# Patient Record
Sex: FEMALE | Race: WHITE | Employment: OTHER | URBAN - NONMETROPOLITAN AREA
[De-identification: names, ages, dates, MRNs, and addresses within clinical notes are randomized per-mention and may not be internally consistent; named-entity substitution may affect disease eponyms.]

---

## 2017-01-11 RX ORDER — BENZONATATE 100 MG/1
100 CAPSULE ORAL 3 TIMES DAILY PRN
Qty: 30 CAPSULE | Refills: 1 | Status: SHIPPED | OUTPATIENT
Start: 2017-01-11 | End: 2018-02-21

## 2017-01-20 ENCOUNTER — CARE COORDINATION (OUTPATIENT)
Dept: INTERNAL MEDICINE | Age: 82
End: 2017-01-20

## 2017-01-25 ENCOUNTER — OFFICE VISIT (OUTPATIENT)
Age: 82
End: 2017-01-25

## 2017-01-25 VITALS
TEMPERATURE: 98.9 F | WEIGHT: 122.2 LBS | OXYGEN SATURATION: 97 % | DIASTOLIC BLOOD PRESSURE: 82 MMHG | BODY MASS INDEX: 20.36 KG/M2 | SYSTOLIC BLOOD PRESSURE: 120 MMHG | HEART RATE: 77 BPM | HEIGHT: 65 IN | RESPIRATION RATE: 14 BRPM

## 2017-01-25 DIAGNOSIS — F03.90 DEMENTIA WITHOUT BEHAVIORAL DISTURBANCE, UNSPECIFIED DEMENTIA TYPE: ICD-10-CM

## 2017-01-25 DIAGNOSIS — J98.4 SMALL AIRWAYS DISEASE: Primary | ICD-10-CM

## 2017-01-25 DIAGNOSIS — J30.89 OTHER ALLERGIC RHINITIS: ICD-10-CM

## 2017-01-25 DIAGNOSIS — J84.10 CALCIFIED GRANULOMA OF LUNG (HCC): ICD-10-CM

## 2017-01-25 DIAGNOSIS — J98.4 PULMONARY SCARRING: ICD-10-CM

## 2017-01-25 PROCEDURE — 99214 OFFICE O/P EST MOD 30 MIN: CPT | Performed by: INTERNAL MEDICINE

## 2017-01-25 RX ORDER — BUDESONIDE AND FORMOTEROL FUMARATE DIHYDRATE 80; 4.5 UG/1; UG/1
2 AEROSOL RESPIRATORY (INHALATION) 2 TIMES DAILY
Qty: 1 INHALER | Refills: 11 | Status: SHIPPED | OUTPATIENT
Start: 2017-01-25 | End: 2022-09-15

## 2017-02-03 RX ORDER — ACETAMINOPHEN 325 MG/1
TABLET ORAL
Qty: 120 TABLET | Refills: 11 | Status: SHIPPED | OUTPATIENT
Start: 2017-02-03 | End: 2020-05-08

## 2017-02-06 ENCOUNTER — OFFICE VISIT (OUTPATIENT)
Dept: CARDIOLOGY | Age: 82
End: 2017-02-06

## 2017-02-06 VITALS
DIASTOLIC BLOOD PRESSURE: 60 MMHG | SYSTOLIC BLOOD PRESSURE: 130 MMHG | HEIGHT: 65 IN | BODY MASS INDEX: 20.66 KG/M2 | HEART RATE: 82 BPM | WEIGHT: 124 LBS

## 2017-02-06 DIAGNOSIS — R94.31 ABNORMAL ECG: ICD-10-CM

## 2017-02-06 DIAGNOSIS — I20.9 ISCHEMIC CHEST PAIN (HCC): Primary | ICD-10-CM

## 2017-02-06 DIAGNOSIS — E78.5 HYPERLIPIDEMIA, UNSPECIFIED HYPERLIPIDEMIA TYPE: ICD-10-CM

## 2017-02-06 DIAGNOSIS — R06.09 DYSPNEA ON EXERTION: ICD-10-CM

## 2017-02-06 DIAGNOSIS — I10 ESSENTIAL HYPERTENSION: ICD-10-CM

## 2017-02-06 PROCEDURE — 99213 OFFICE O/P EST LOW 20 MIN: CPT | Performed by: INTERNAL MEDICINE

## 2017-02-10 RX ORDER — ASPIRIN 81 MG/1
81 TABLET, CHEWABLE ORAL DAILY
Qty: 30 TABLET | Refills: 11 | Status: CANCELLED | OUTPATIENT
Start: 2017-02-10

## 2017-02-10 RX ORDER — ASPIRIN 81 MG/1
81 TABLET, CHEWABLE ORAL DAILY
Qty: 30 TABLET | Refills: 11 | Status: SHIPPED | OUTPATIENT
Start: 2017-02-10

## 2017-02-13 RX ORDER — POTASSIUM CHLORIDE 20 MEQ/1
20 TABLET, EXTENDED RELEASE ORAL 2 TIMES DAILY
Qty: 60 TABLET | Refills: 11 | Status: SHIPPED | OUTPATIENT
Start: 2017-02-13

## 2017-02-13 RX ORDER — POTASSIUM CHLORIDE 20 MEQ/1
20 TABLET, EXTENDED RELEASE ORAL 2 TIMES DAILY
Qty: 60 TABLET | Refills: 11 | OUTPATIENT
Start: 2017-02-13

## 2017-02-15 ENCOUNTER — PROCEDURE VISIT (OUTPATIENT)
Dept: CARDIOLOGY | Age: 82
End: 2017-02-15

## 2017-02-15 DIAGNOSIS — R94.31 ABNORMAL ECG: Primary | ICD-10-CM

## 2017-02-15 DIAGNOSIS — I20.9 ISCHEMIC CHEST PAIN (HCC): ICD-10-CM

## 2017-02-15 PROCEDURE — 93015 CV STRESS TEST SUPVJ I&R: CPT | Performed by: INTERNAL MEDICINE

## 2017-02-15 RX ORDER — AMINOPHYLLINE DIHYDRATE 25 MG/ML
100 INJECTION, SOLUTION INTRAVENOUS ONCE
Status: COMPLETED | OUTPATIENT
Start: 2017-02-15 | End: 2017-02-15

## 2017-02-15 RX ADMIN — AMINOPHYLLINE DIHYDRATE 100 MG: 25 INJECTION, SOLUTION INTRAVENOUS at 10:03

## 2017-02-17 ENCOUNTER — PROCEDURE VISIT (OUTPATIENT)
Dept: CARDIOLOGY | Age: 82
End: 2017-02-17

## 2017-02-17 DIAGNOSIS — R94.31 ABNORMAL EKG: Primary | ICD-10-CM

## 2017-02-17 DIAGNOSIS — R06.09 DYSPNEA ON EXERTION: ICD-10-CM

## 2017-02-17 LAB
LEFT VENTRICULAR EJECTION FRACTION HIGH VALUE: NORMAL %
LEFT VENTRICULAR EJECTION FRACTION MODE: NORMAL
LV EF: 55 %
LV EF: 55 %
LVEF MODALITY: NORMAL

## 2017-02-17 PROCEDURE — 93306 TTE W/DOPPLER COMPLETE: CPT | Performed by: INTERNAL MEDICINE

## 2017-02-22 DIAGNOSIS — R92.8 ABNORMAL MAMMOGRAM: Primary | ICD-10-CM

## 2017-03-01 ENCOUNTER — HOSPITAL ENCOUNTER (OUTPATIENT)
Age: 82
Setting detail: SPECIMEN
Discharge: HOME | End: 2017-03-01
Payer: MEDICARE

## 2017-03-01 DIAGNOSIS — E55.9 VITAMIN D DEFICIENCY: ICD-10-CM

## 2017-03-01 DIAGNOSIS — I10 ESSENTIAL HYPERTENSION: ICD-10-CM

## 2017-03-01 DIAGNOSIS — E78.5 HYPERLIPIDEMIA, UNSPECIFIED HYPERLIPIDEMIA TYPE: ICD-10-CM

## 2017-03-01 DIAGNOSIS — J44.9 CHRONIC OBSTRUCTIVE PULMONARY DISEASE, UNSPECIFIED COPD TYPE (HCC): ICD-10-CM

## 2017-03-01 LAB
ALBUMIN SERPL-MCNC: 4.1 G/DL (ref 3.5–5.2)
ALBUMIN/GLOBULIN RATIO: 1.5 (ref 1–2.5)
ALP BLD-CCNC: 62 U/L (ref 35–104)
ALT SERPL-CCNC: 14 U/L (ref 5–33)
ANION GAP SERPL CALCULATED.3IONS-SCNC: 11 MMOL/L (ref 9–17)
AST SERPL-CCNC: 24 U/L
BILIRUB SERPL-MCNC: 0.29 MG/DL (ref 0.3–1.2)
BUN BLDV-MCNC: 9 MG/DL (ref 8–23)
BUN/CREAT BLD: 13 (ref 9–20)
CALCIUM SERPL-MCNC: 9.6 MG/DL (ref 8.6–10.4)
CHLORIDE BLD-SCNC: 101 MMOL/L (ref 98–107)
CHOLESTEROL/HDL RATIO: 2
CHOLESTEROL: 174 MG/DL
CO2: 27 MMOL/L (ref 20–31)
CREAT SERPL-MCNC: 0.71 MG/DL (ref 0.5–0.9)
GFR AFRICAN AMERICAN: >60 ML/MIN
GFR NON-AFRICAN AMERICAN: >60 ML/MIN
GFR SERPL CREATININE-BSD FRML MDRD: ABNORMAL ML/MIN/{1.73_M2}
GFR SERPL CREATININE-BSD FRML MDRD: ABNORMAL ML/MIN/{1.73_M2}
GLUCOSE BLD-MCNC: 98 MG/DL (ref 70–99)
HDLC SERPL-MCNC: 85 MG/DL
LDL CHOLESTEROL: 76 MG/DL (ref 0–130)
POTASSIUM SERPL-SCNC: 4.3 MMOL/L (ref 3.7–5.3)
SODIUM BLD-SCNC: 139 MMOL/L (ref 135–144)
THEOPHYLLINE DATE LAST DOSE: NORMAL
THEOPHYLLINE DOSE AMOUNT: NORMAL
THEOPHYLLINE LEVEL: 11 UG/ML (ref 5–15)
THEOPHYLLINE TIME LAST DOSE: NORMAL
TOTAL PROTEIN: 6.8 G/DL (ref 6.4–8.3)
TRIGL SERPL-MCNC: 66 MG/DL
VITAMIN D 25-HYDROXY: 33.4 NG/ML (ref 30–100)
VLDLC SERPL CALC-MCNC: 13 MG/DL (ref 1–30)

## 2017-03-01 PROCEDURE — 99001 SPECIMEN HANDLING PT-LAB: CPT | Performed by: INTERNAL MEDICINE

## 2017-03-01 PROCEDURE — 80053 COMPREHEN METABOLIC PANEL: CPT | Performed by: INTERNAL MEDICINE

## 2017-03-01 PROCEDURE — 80061 LIPID PANEL: CPT | Performed by: INTERNAL MEDICINE

## 2017-03-02 RX ORDER — THEOPHYLLINE 300 MG/1
TABLET, EXTENDED RELEASE ORAL
Qty: 30 TABLET | Refills: 11 | Status: SHIPPED | OUTPATIENT
Start: 2017-03-02 | End: 2017-11-08

## 2017-03-08 RX ORDER — ISOSORBIDE MONONITRATE 30 MG/1
30 TABLET, EXTENDED RELEASE ORAL DAILY
Qty: 30 TABLET | Refills: 11 | Status: ON HOLD | OUTPATIENT
Start: 2017-03-08 | End: 2022-09-20 | Stop reason: HOSPADM

## 2017-03-09 RX ORDER — LORATADINE 10 MG/1
10 CAPSULE, LIQUID FILLED ORAL DAILY
Qty: 30 CAPSULE | Refills: 11 | Status: SHIPPED | OUTPATIENT
Start: 2017-03-09

## 2017-03-10 ENCOUNTER — OFFICE VISIT (OUTPATIENT)
Dept: INTERNAL MEDICINE | Age: 82
End: 2017-03-10

## 2017-03-10 VITALS
HEART RATE: 88 BPM | DIASTOLIC BLOOD PRESSURE: 62 MMHG | WEIGHT: 120 LBS | BODY MASS INDEX: 19.99 KG/M2 | HEIGHT: 65 IN | SYSTOLIC BLOOD PRESSURE: 130 MMHG

## 2017-03-10 DIAGNOSIS — J44.9 CHRONIC OBSTRUCTIVE PULMONARY DISEASE, UNSPECIFIED COPD TYPE (HCC): ICD-10-CM

## 2017-03-10 DIAGNOSIS — I10 ESSENTIAL HYPERTENSION: Primary | ICD-10-CM

## 2017-03-10 DIAGNOSIS — E78.5 HYPERLIPIDEMIA, UNSPECIFIED HYPERLIPIDEMIA TYPE: ICD-10-CM

## 2017-03-10 PROCEDURE — G8510 SCR DEP NEG, NO PLAN REQD: HCPCS | Performed by: INTERNAL MEDICINE

## 2017-03-10 PROCEDURE — 3288F FALL RISK ASSESSMENT DOCD: CPT | Performed by: INTERNAL MEDICINE

## 2017-03-10 PROCEDURE — 99214 OFFICE O/P EST MOD 30 MIN: CPT | Performed by: INTERNAL MEDICINE

## 2017-03-10 ASSESSMENT — ENCOUNTER SYMPTOMS
NAUSEA: 0
CONSTIPATION: 0
EYE PAIN: 0
DIARRHEA: 0
ABDOMINAL PAIN: 0
COUGH: 0
VOMITING: 0
SHORTNESS OF BREATH: 0
BLOOD IN STOOL: 0
BACK PAIN: 0

## 2017-03-10 ASSESSMENT — COPD QUESTIONNAIRES: COPD: 1

## 2017-03-10 ASSESSMENT — PATIENT HEALTH QUESTIONNAIRE - PHQ9
1. LITTLE INTEREST OR PLEASURE IN DOING THINGS: 1
2. FEELING DOWN, DEPRESSED OR HOPELESS: 1
SUM OF ALL RESPONSES TO PHQ QUESTIONS 1-9: 2
SUM OF ALL RESPONSES TO PHQ9 QUESTIONS 1 & 2: 2

## 2017-03-13 ENCOUNTER — TELEPHONE (OUTPATIENT)
Dept: GENERAL RADIOLOGY | Age: 82
End: 2017-03-13

## 2017-03-13 DIAGNOSIS — R92.8 ABNORMAL MAMMOGRAM: Primary | ICD-10-CM

## 2017-03-13 RX ORDER — HYDROCHLOROTHIAZIDE 12.5 MG/1
CAPSULE, GELATIN COATED ORAL
Qty: 30 CAPSULE | Refills: 11 | Status: SHIPPED | OUTPATIENT
Start: 2017-03-13 | End: 2022-09-20

## 2017-03-13 RX ORDER — LORAZEPAM 0.5 MG/1
0.5 TABLET ORAL EVERY 6 HOURS PRN
Qty: 90 TABLET | Refills: 2 | Status: ON HOLD | OUTPATIENT
Start: 2017-03-13 | End: 2018-04-21

## 2017-03-20 DIAGNOSIS — J30.89 OTHER ALLERGIC RHINITIS: ICD-10-CM

## 2017-03-20 RX ORDER — FLUTICASONE PROPIONATE 50 MCG
1 SPRAY, SUSPENSION (ML) NASAL DAILY
Qty: 1 BOTTLE | Refills: 11 | Status: SHIPPED | OUTPATIENT
Start: 2017-03-20

## 2017-04-04 RX ORDER — MULTIVITAMIN
1 TABLET ORAL DAILY
Qty: 30 TABLET | Refills: 11 | Status: SHIPPED | OUTPATIENT
Start: 2017-04-04

## 2017-08-23 ENCOUNTER — OUTSIDE SERVICES (OUTPATIENT)
Dept: FAMILY MEDICINE CLINIC | Age: 82
End: 2017-08-23
Payer: MEDICARE

## 2017-08-23 DIAGNOSIS — J41.1 MUCOPURULENT CHRONIC BRONCHITIS (HCC): ICD-10-CM

## 2017-08-23 DIAGNOSIS — F41.9 ANXIETY: ICD-10-CM

## 2017-08-23 DIAGNOSIS — I10 ESSENTIAL HYPERTENSION: ICD-10-CM

## 2017-08-23 DIAGNOSIS — G44.209 ACUTE NON INTRACTABLE TENSION-TYPE HEADACHE: Primary | ICD-10-CM

## 2017-08-23 DIAGNOSIS — F03.90 DEMENTIA WITHOUT BEHAVIORAL DISTURBANCE, UNSPECIFIED DEMENTIA TYPE: ICD-10-CM

## 2017-08-23 PROCEDURE — 99309 SBSQ NF CARE MODERATE MDM 30: CPT | Performed by: FAMILY MEDICINE

## 2017-08-23 ASSESSMENT — ENCOUNTER SYMPTOMS
WHEEZING: 0
SHORTNESS OF BREATH: 0
SINUS PRESSURE: 1
CONSTIPATION: 0
DIARRHEA: 0
BACK PAIN: 0
COUGH: 1
ABDOMINAL PAIN: 0
CHEST TIGHTNESS: 0

## 2017-10-04 ENCOUNTER — OUTSIDE SERVICES (OUTPATIENT)
Dept: FAMILY MEDICINE CLINIC | Age: 82
End: 2017-10-04
Payer: MEDICARE

## 2017-10-04 DIAGNOSIS — I10 ESSENTIAL HYPERTENSION: ICD-10-CM

## 2017-10-04 DIAGNOSIS — J41.1 MUCOPURULENT CHRONIC BRONCHITIS (HCC): Primary | ICD-10-CM

## 2017-10-04 DIAGNOSIS — E78.2 MIXED HYPERLIPIDEMIA: ICD-10-CM

## 2017-10-04 DIAGNOSIS — F03.90 DEMENTIA WITHOUT BEHAVIORAL DISTURBANCE, UNSPECIFIED DEMENTIA TYPE: ICD-10-CM

## 2017-10-04 PROCEDURE — 99309 SBSQ NF CARE MODERATE MDM 30: CPT | Performed by: FAMILY MEDICINE

## 2017-10-04 RX ORDER — ATORVASTATIN CALCIUM 10 MG/1
1 TABLET, FILM COATED ORAL DAILY
COMMUNITY
Start: 2017-07-05 | End: 2017-10-04

## 2017-10-04 ASSESSMENT — ENCOUNTER SYMPTOMS
COUGH: 1
CONSTIPATION: 0
DIARRHEA: 0
VOICE CHANGE: 1
CHEST TIGHTNESS: 0
WHEEZING: 0
SHORTNESS OF BREATH: 1
ABDOMINAL PAIN: 0
SINUS PRESSURE: 0
BACK PAIN: 0

## 2017-10-04 NOTE — PROGRESS NOTES
1956 [facility]  Dept: [phone]  Dept Fax: [phone]  Loc: [phone]    William Luna is a 80 y.o. female who presents today for her medical conditions/complaints as noted below. William Luna is c/o of   Chief Complaint   Patient presents with    COPD    Dementia    Hypertension    Hyperlipidemia       HPI:     HPI Tamica Dowling was seen today for a follow up of her COPD, dementia, hypertension and hyperlipidemia. COPD: she feels she is a little worse. She currently has a cough and some sinus drainage. She did use some flonase for her sinuses a few days ago and that seems to have helped relieve some of her congestion. She is chronically short of breath and she does not feel that she is any worse than normal. Her cough is not productive. She has not had any fevers. Dementia: stable; she is unaware that she is confused. She regularly calls the back to ask about her accounts but she did not mention anything about that today. She is annoyed that she has to wear her anti-wandering device. Hypertension: stable; she is not having any issues with chest pain, headaches, leg swelling, dizziness or fainting. Hyperlipidemia: stable; she is eating a fairly healthy diet at [facility] and she is not having any cardiac symptoms.      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 Neurological: Negative for dizziness, syncope, weakness, light-headedness and headaches. Psychiatric/Behavioral: Negative for dysphoric mood. Objective:     Physical Exam   Constitutional: She is oriented to person, place, and time. She appears well-developed and well-nourished. No distress. Eyes: Conjunctivae and EOM are normal. Pupils are equal, round, and reactive to light. Neck: Normal range of motion. Neck supple. No thyromegaly present. Cardiovascular: Normal rate, regular rhythm, normal heart sounds and intact distal pulses. No murmur heard. Pulmonary/Chest: Effort normal and breath sounds normal. No respiratory distress. She has no wheezes. Musculoskeletal: She exhibits no edema. Lymphadenopathy:     She has no cervical adenopathy. Neurological: She is alert and oriented to person, place, and time. Skin: Skin is warm and dry. No rash noted. No erythema. Psychiatric: She has a normal mood and affect. Her speech is normal. Her mood appears not anxious. Cognition and memory are impaired. She expresses impulsivity. She does not exhibit a depressed mood. She exhibits abnormal recent memory and abnormal remote memory. She is inattentive. Nursing note and vitals reviewed. There were no vitals taken for this visit. Assessment:      1. Mucopurulent chronic bronchitis ([address]     2. Dementia without behavioral disturbance, unspecified dementia type     3. Mixed hyperlipidemia     4. Essential hypertension               Plan:       COPD: stable; her symptoms are at baseline today. She was advised to continue with her inhalers, flonase, and nasal saline. She chronically thinks she has a URI so I will just watch her for now. Dementia: stable; she seemed a little more focused today and she remembered who I was. She is not having any behavioral disturbances so I will continue to mentor.      Hyperlipidemia: stable; she is not in the statin benefit group any more so I discontinued her lipitor. HTN: stable; she is not having any episodes of hypo tension and she is not having symptoms of hypertension so I will continue her current medications. Return in about 1 month (around 11/4/2017) for HTN follow up, COPD follow up. Patient given educational materials - see patient instructions. Discussed use, benefit, and side effects of prescribed medications. All patient questions answered. Pt voiced understanding. Reviewed health maintenance. Instructed to continue current medications, diet and exercise. Patient agreed with treatment plan. Follow up as directed.      Electronically signed by Analilia Owens MD on 10/4/2017 at 7:19 PM

## 2017-11-08 ENCOUNTER — OUTSIDE SERVICES (OUTPATIENT)
Dept: FAMILY MEDICINE CLINIC | Age: 82
End: 2017-11-08
Payer: MEDICARE

## 2017-11-08 DIAGNOSIS — J41.8 MIXED SIMPLE AND MUCOPURULENT CHRONIC BRONCHITIS (HCC): Primary | ICD-10-CM

## 2017-11-08 DIAGNOSIS — F03.90 DEMENTIA WITHOUT BEHAVIORAL DISTURBANCE, UNSPECIFIED DEMENTIA TYPE: ICD-10-CM

## 2017-11-08 DIAGNOSIS — I10 ESSENTIAL HYPERTENSION: ICD-10-CM

## 2017-11-08 PROCEDURE — 99308 SBSQ NF CARE LOW MDM 20: CPT | Performed by: FAMILY MEDICINE

## 2017-11-08 ASSESSMENT — ENCOUNTER SYMPTOMS
WHEEZING: 0
BACK PAIN: 0
CONSTIPATION: 0
COUGH: 1
CHEST TIGHTNESS: 0
VOICE CHANGE: 0
SINUS PRESSURE: 1
ABDOMINAL PAIN: 0
DIARRHEA: 0
SHORTNESS OF BREATH: 1

## 2017-11-09 NOTE — PROGRESS NOTES
1956 [place] [facility]  Dept: [phone]  Dept Fax: [phone]  Loc: [phone]    Jerardo Haas is a 80 y.o. female who presents today for her medical conditions/complaints as noted below. Jerardo Haas is c/o of   Chief Complaint   Patient presents with    Hypertension    COPD    Dementia       HPI:     HPI Erica Murray was seen today for a follow up of her HTN, COPD and Dementia. COPD: She is having a cough which is chronic for her. She is not having any ear pain, no sore throat, no fever. She does have chronic sinusitis as well. She does ask for her aresols on a regular basis and they provide moderate relief of her symptoms. HTN: she feels like she is doing well. She is not having any chest pain or leg swelling and her shortness of breath is chronic and stable. She is also not getting dizzy or lightheaded. Dementia: stable; she was able to have her anti-wandering device off a week ago. She does not realize that she has dementia. She is not having any behavioral disturbances.       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          Social History   Substance Use Topics    Smoking status: Never Smoker    Smokeless tobacco: Never Used    Alcohol use No      Current Outpatient Prescriptions   Medication Sig Dispense Refill    Multiple Vitamin (MULTI VITAMIN DAILY) TABS Take 1 tablet by mouth daily Take 1 tab po daily 30 tablet 11    fluticasone (FLONASE) 50 MCG/ACT nasal spray 1 spray by Nasal route daily 1 Bottle 11    LORazepam (ATIVAN) 0.5 MG tablet Take 1 tablet by mouth every 6 hours as needed for Anxiety 90 tablet 2    hydrochlorothiazide (MICROZIDE) 12.5 MG capsule take 1 capsule by mouth once daily 30 capsule 11    Calcium Carbonate (CALCIUM 600 PO) Take 600 mg by mouth 2 times daily      loratadine (CLARITIN) 10 MG capsule Take 1 capsule by mouth daily 30 capsule 11    isosorbide mononitrate (IMDUR) 30 MG extended release tablet Take 1 tablet by mouth daily 30 tablet 11    potassium chloride (KLOR-CON M) 20 MEQ extended release tablet Take 1 tablet by mouth 2 times daily 60 tablet 11    aspirin 81 MG chewable tablet Take 1 tablet by mouth daily 30 tablet 11    acetaminophen (TYLENOL) 325 MG tablet 1-2 tablets every 4 hours prn pain/ fever.  120 tablet 11    budesonide-formoterol (SYMBICORT) 80-4.5 MCG/ACT AERO Inhale 2 puffs into the lungs 2 times daily 1 Inhaler 11    benzonatate (TESSALON PERLES) 100 MG capsule Take 1 capsule by mouth 3 times daily as needed for Cough 30 capsule 1    Cholecalciferol (VITAMIN D) 2000 UNITS CAPS capsule Take 4,000 Units by mouth daily      nitroGLYCERIN (NITROSTAT) 0.4 MG SL tablet Place 1 tablet under the tongue every 5 minutes as needed for Chest pain 25 tablet 3    metoprolol tartrate (LOPRESSOR) 25 MG tablet take 1 tablet by mouth twice a day 60 tablet 11    busPIRone (BUSPAR) 10 MG tablet Take 1 tablet by mouth 2 times daily 60 tablet 11    aluminum & magnesium hydroxide-simethicone (MAALOX REGULAR STRENGTH) 200-200-20 MG/5ML SUSP suspension Take 30 mLs by mouth every 6 hours as needed for Indigestion 1 Bottle 5    mirtazapine (REMERON) 15 MG tablet Take 0.5 tablets by mouth nightly 45 tablet 3    lisinopril (PRINIVIL;ZESTRIL) 5 MG tablet take 1 tablet by mouth twice a day 60 tablet 11    levothyroxine (SYNTHROID) 50 MCG tablet Take 1 tablet by mouth Daily 30 tablet 11    donepezil (ARICEPT) 10 MG tablet Take 10 mg by mouth daily      albuterol (PROVENTIL) (2.5 MG/3ML) 0.083% nebulizer

## 2017-12-13 ENCOUNTER — OUTSIDE SERVICES (OUTPATIENT)
Dept: FAMILY MEDICINE CLINIC | Age: 82
End: 2017-12-13
Payer: MEDICARE

## 2017-12-13 DIAGNOSIS — F41.9 ANXIETY: Primary | ICD-10-CM

## 2017-12-13 DIAGNOSIS — F03.90 DEMENTIA WITHOUT BEHAVIORAL DISTURBANCE, UNSPECIFIED DEMENTIA TYPE: ICD-10-CM

## 2017-12-13 DIAGNOSIS — J41.1 MUCOPURULENT CHRONIC BRONCHITIS (HCC): ICD-10-CM

## 2017-12-13 DIAGNOSIS — I10 ESSENTIAL HYPERTENSION: ICD-10-CM

## 2017-12-13 PROCEDURE — 99309 SBSQ NF CARE MODERATE MDM 30: CPT | Performed by: FAMILY MEDICINE

## 2017-12-13 ASSESSMENT — ENCOUNTER SYMPTOMS
VOICE CHANGE: 0
BACK PAIN: 0
DIARRHEA: 0
WHEEZING: 0
SINUS PRESSURE: 0
CHEST TIGHTNESS: 0
CONSTIPATION: 0
ABDOMINAL PAIN: 0
SHORTNESS OF BREATH: 1
COUGH: 0

## 2017-12-13 NOTE — PROGRESS NOTES
1956 [place] [facility]  Dept: [phone]  Dept Fax: [phone]  Loc: [phone]    Charleen Garcia is a 80 y.o. female who presents today for her medical conditions/complaints as noted below. Charleen Garcia is c/o of   Chief Complaint   Patient presents with    Dementia    Hypertension    COPD    Anxiety       HPI:     HPI Gil Singh was seen today at [facility] for her regular monthly follow up for COPD, dementia, anxiety and hypertension. COPD: stable; she still feels like her chest is tight and she continues to complain that the \"air\" is not right in the building. She is reminded that the air at [facility] is specially treated for her lungs and this reassures her. She uses her breathing treatments regularly and this seems to help. Dementia: stable; she is doing well. She is not having any behavioral disturbances and she is not having any issues with paranoia or wandering. She is still tolerating the aricept well. HTN: stable; she is not having any issues with dizziness or lightheadedness and she is not having any shortness of breath or chest pain. Anxiety: stable; she is no longer on remeron and she has not noticed any difference in her mood or her sleep so I will continue her off of it.        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          Social History   Substance Use Topics    Smoking status: Never Smoker    Smokeless tobacco: Never Used    Alcohol use No      Current Outpatient Prescriptions   Medication Sig Dispense Refill    Multiple Vitamin (MULTI VITAMIN DAILY) TABS Take 1 tablet by mouth daily Take 1 tab po daily 30 tablet 11    fluticasone (FLONASE) 50 MCG/ACT nasal spray 1 spray by Nasal route daily 1 Bottle 11    LORazepam (ATIVAN) 0.5 MG tablet Take 1 tablet by mouth every 6 hours as needed for Anxiety 90 tablet 2    hydrochlorothiazide (MICROZIDE) 12.5 MG capsule take 1 capsule by mouth once daily 30 capsule 11    Calcium Carbonate (CALCIUM 600 PO) Take 600 mg by mouth 2 times daily      loratadine (CLARITIN) 10 MG capsule Take 1 capsule by mouth daily 30 capsule 11    isosorbide mononitrate (IMDUR) 30 MG extended release tablet Take 1 tablet by mouth daily 30 tablet 11    potassium chloride (KLOR-CON M) 20 MEQ extended release tablet Take 1 tablet by mouth 2 times daily 60 tablet 11    aspirin 81 MG chewable tablet Take 1 tablet by mouth daily 30 tablet 11    acetaminophen (TYLENOL) 325 MG tablet 1-2 tablets every 4 hours prn pain/ fever.  120 tablet 11    budesonide-formoterol (SYMBICORT) 80-4.5 MCG/ACT AERO Inhale 2 puffs into the lungs 2 times daily 1 Inhaler 11    benzonatate (TESSALON PERLES) 100 MG capsule Take 1 capsule by mouth 3 times daily as needed for Cough 30 capsule 1    Cholecalciferol (VITAMIN D) 2000 UNITS CAPS capsule Take 4,000 Units by mouth daily      nitroGLYCERIN (NITROSTAT) 0.4 MG SL tablet Place 1 tablet under the tongue every 5 minutes as needed for Chest pain 25 tablet 3    metoprolol tartrate (LOPRESSOR) 25 MG tablet take 1 tablet by mouth twice a day 60 tablet 11    busPIRone (BUSPAR) 10 MG tablet Take 1 tablet by mouth 2 times daily 60 tablet 11    aluminum & magnesium hydroxide-simethicone (MAALOX REGULAR STRENGTH) 200-200-20 MG/5ML SUSP suspension Take 30 mLs by mouth every 6 hours as needed for Indigestion 1 Bottle 5    lisinopril (PRINIVIL;ZESTRIL) 5 MG tablet take 1 tablet by mouth twice a day 60 tablet 11    levothyroxine (SYNTHROID) 50 MCG tablet Take 1 tablet by mouth Daily 30 tablet 11    donepezil (ARICEPT) 10 MG tablet Take 10 mg by mouth daily      albuterol (PROVENTIL) (2.5 MG/3ML) 0.083% nebulizer solution Take 3 mLs by nebulization 3 times daily And q6h prn (Patient taking differently: Take 2.5 mg by nebulization every 6 hours as needed ) 120 each 11    simethicone (MYLICON) 963 MG chewable tablet Take 125 mg by mouth every 6 hours as needed for Flatulence      albuterol sulfate HFA (PROAIR HFA) 108 (90 BASE) MCG/ACT inhaler inhale 2 puffs by mouth every 6 hours if needed for wheezing 1 Inhaler 11    OXYGEN Inhale 2 L/min into the lungs nightly.  mirtazapine (REMERON) 15 MG tablet Take 0.5 tablets by mouth nightly 45 tablet 3     No current facility-administered medications for this visit. Allergies   Allergen Reactions    Amlodipine Other (See Comments)     Pt says she got all the side effects      Sulfa Antibiotics     Cleocin [Clindamycin] Rash    Macrolides And Ketolides Rash     \"All mycins\"    Pcn [Penicillins] Rash    Tetracyclines & Related Rash    Zithromax [Azithromycin] Rash       Subjective:      Review of Systems   Constitutional: Negative for activity change, appetite change, chills, fatigue and fever. HENT: Negative for congestion, postnasal drip, sinus pressure, sneezing and voice change. Eyes: Negative for visual disturbance. Respiratory: Positive for shortness of breath (chronic). Negative for cough, chest tightness and wheezing. Cardiovascular: Negative for chest pain, palpitations and leg swelling. Gastrointestinal: Negative for abdominal pain, constipation and diarrhea. Genitourinary: Negative for difficulty urinating. Musculoskeletal: Negative for back pain and myalgias. Skin: Negative for rash. Allergic/Immunologic: Positive for environmental allergies. Neurological: Negative for dizziness, syncope, weakness, light-headedness and headaches. Psychiatric/Behavioral: Positive for confusion (chronic).  Negative for dysphoric mood and sleep disturbance. The patient is not nervous/anxious. Objective:     Physical Exam   Constitutional: She is oriented to person, place, and time. She appears well-developed and well-nourished. No distress. Eyes: Conjunctivae and EOM are normal. Pupils are equal, round, and reactive to light. Neck: Normal range of motion. Neck supple. No thyromegaly present. Cardiovascular: Normal rate, regular rhythm, normal heart sounds and intact distal pulses. No murmur heard. Pulmonary/Chest: Effort normal and breath sounds normal. No respiratory distress. She has no wheezes. Musculoskeletal: She exhibits no edema. Lymphadenopathy:     She has no cervical adenopathy. Neurological: She is alert and oriented to person, place, and time. Skin: Skin is warm and dry. No rash noted. No erythema. Psychiatric: She has a normal mood and affect. Her speech is normal. Her mood appears not anxious. Cognition and memory are impaired. She expresses impulsivity. She does not exhibit a depressed mood. She exhibits abnormal recent memory and abnormal remote memory. She is inattentive. Nursing note and vitals reviewed. Assessment:      1. Anxiety     2. Essential hypertension     3. Dementia without behavioral disturbance, unspecified dementia type     4. Mucopurulent chronic bronchitis ([address]               Plan:       Anxiety: stable; she is doing well off of remeron so I will continue without it. I will continue to monitor her. HTN: stable; she is not having any hypotension so I will continue her current medications. Dementia: stable; no recent changes in her behavior or mood so I will continue the Aricept at her current dose. COPD: stable; she is not wheezing and she is getting good relief from her aresols so I will continue them. Return in about 1 month (around 1/13/2018) for Anxiety follow up. Patient given educational materials - see patient instructions.   Discussed

## 2018-01-17 ENCOUNTER — OUTSIDE SERVICES (OUTPATIENT)
Dept: FAMILY MEDICINE CLINIC | Age: 83
End: 2018-01-17
Payer: MEDICARE

## 2018-01-17 DIAGNOSIS — F03.90 DEMENTIA WITHOUT BEHAVIORAL DISTURBANCE, UNSPECIFIED DEMENTIA TYPE: ICD-10-CM

## 2018-01-17 DIAGNOSIS — I10 ESSENTIAL HYPERTENSION: Primary | ICD-10-CM

## 2018-01-17 DIAGNOSIS — F41.9 ANXIETY: ICD-10-CM

## 2018-01-17 DIAGNOSIS — J41.1 MUCOPURULENT CHRONIC BRONCHITIS (HCC): ICD-10-CM

## 2018-01-17 PROCEDURE — 99309 SBSQ NF CARE MODERATE MDM 30: CPT | Performed by: FAMILY MEDICINE

## 2018-01-18 ASSESSMENT — ENCOUNTER SYMPTOMS
WHEEZING: 0
SHORTNESS OF BREATH: 1
BACK PAIN: 0
SINUS PRESSURE: 1
COUGH: 0
VOICE CHANGE: 0
DIARRHEA: 0
CHEST TIGHTNESS: 0
ABDOMINAL PAIN: 0
CONSTIPATION: 0

## 2018-01-18 NOTE — PROGRESS NOTES
1956 [place] [facility]  Dept: [phone]  Dept Fax: [phone]  Loc: [phone]    Maryellen Dillard is a 80 y.o. female who presents today for her medical conditions/complaints as noted below. Maryellen Dillard is c/o of   Chief Complaint   Patient presents with    Hypertension    Cough     COPD    Dementia       HPI:     HPI Agustin Rae was seen on 1/17/18 for her regular follow up at [facility] of her HTN, COPD and dementia. HTN: stable; she feels that she is doing very well. She is not having any issues with lightheadedness or dizziness. She is not having any headaches other than some sinus headaches. She denies chest pain and shortness of breath. She also denies leg swelling. COPD: stable; she has days that are worse than others and she gets more short of breath or she coughs more. She was feeling pretty good when I saw her. She uses her breathing treatments as needed. Dementia: stable, she is not having any issues with behavioral disturbances and she is not getting overly confused or anxious.       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          Social History   Substance Use Topics    Smoking status: Never Smoker    Smokeless tobacco: Never Used    Alcohol use No      Current Outpatient Prescriptions   Medication Sig Dispense Refill    Multiple Vitamin (MULTI VITAMIN DAILY) TABS Take 1 tablet by mouth daily Take 1 tab po daily 30 tablet 11    fluticasone (FLONASE) 50 MCG/ACT nasal spray 1 spray by Nasal route daily 1 Bottle 11    LORazepam nebulizer solution Take 3 mLs by nebulization 3 times daily And q6h prn (Patient taking differently: Take 2.5 mg by nebulization every 6 hours as needed ) 120 each 11    simethicone (MYLICON) 506 MG chewable tablet Take 125 mg by mouth every 6 hours as needed for Flatulence      albuterol sulfate HFA (PROAIR HFA) 108 (90 BASE) MCG/ACT inhaler inhale 2 puffs by mouth every 6 hours if needed for wheezing 1 Inhaler 11    OXYGEN Inhale 2 L/min into the lungs nightly. No current facility-administered medications for this visit. Allergies   Allergen Reactions    Amlodipine Other (See Comments)     Pt says she got all the side effects      Sulfa Antibiotics     Cleocin [Clindamycin] Rash    Macrolides And Ketolides Rash     \"All mycins\"    Pcn [Penicillins] Rash    Tetracyclines & Related Rash    Zithromax [Azithromycin] Rash       Subjective:      Review of Systems   Constitutional: Negative for activity change, appetite change, chills, fatigue and fever. HENT: Positive for sinus pressure (chronic). Negative for congestion, postnasal drip, sneezing and voice change. Eyes: Negative for visual disturbance. Respiratory: Positive for shortness of breath (chronic). Negative for cough, chest tightness and wheezing. Cardiovascular: Negative for chest pain, palpitations and leg swelling. Gastrointestinal: Negative for abdominal pain, constipation and diarrhea. Genitourinary: Negative for difficulty urinating. Musculoskeletal: Negative for back pain and myalgias. Skin: Negative for rash. Allergic/Immunologic: Positive for environmental allergies. Neurological: Negative for dizziness, syncope, weakness, light-headedness and headaches. Psychiatric/Behavioral: Positive for confusion (chronic). Negative for dysphoric mood and sleep disturbance. The patient is not nervous/anxious. Objective:     Physical Exam   Constitutional: She is oriented to person, place, and time.  She appears

## 2018-02-21 ENCOUNTER — OUTSIDE SERVICES (OUTPATIENT)
Dept: FAMILY MEDICINE CLINIC | Age: 83
End: 2018-02-21
Payer: MEDICARE

## 2018-02-21 DIAGNOSIS — F41.9 ANXIETY: ICD-10-CM

## 2018-02-21 DIAGNOSIS — F03.90 DEMENTIA WITHOUT BEHAVIORAL DISTURBANCE, UNSPECIFIED DEMENTIA TYPE: ICD-10-CM

## 2018-02-21 DIAGNOSIS — I10 ESSENTIAL HYPERTENSION: Primary | ICD-10-CM

## 2018-02-21 DIAGNOSIS — J41.8 MIXED SIMPLE AND MUCOPURULENT CHRONIC BRONCHITIS (HCC): ICD-10-CM

## 2018-02-23 PROCEDURE — 99308 SBSQ NF CARE LOW MDM 20: CPT | Performed by: FAMILY MEDICINE

## 2018-02-23 ASSESSMENT — ENCOUNTER SYMPTOMS
VOICE CHANGE: 0
CHEST TIGHTNESS: 0
CONSTIPATION: 0
COUGH: 0
WHEEZING: 0
ABDOMINAL PAIN: 0
SINUS PRESSURE: 1
SHORTNESS OF BREATH: 1
BACK PAIN: 0
DIARRHEA: 0

## 2018-02-23 NOTE — PROGRESS NOTES
1956 [facility]  Dept: [phone]  Dept Fax: [phone]  Loc: [phone]    Amy Campbell is a 80 y.o. female who presents today for her medical conditions/complaints as noted below. Amy Cmapbell is c/o of   Chief Complaint   Patient presents with    COPD    Dementia    Anxiety    Hypertension       HPI:     ALDA Campbell was seen on 2/21/18 for her regularly monthly follow up for her COPD, Dementia, Anxiety and HTN. She is doing fairly well overall. Her COPD is slightly worse today because it has been more humid than normal that makes her chest tight. She is also due for her evening nebulizer treatment. She is not bringing up more sputum than normal and she has not had any fever or change in sputum color. Dementia: stable; she is not having any new issues and she reports that her memory is just fine. Anxiety: stable; she said that she is always \"fast paced\" but she is not anxious and she overall feels good. HTN: stable; she is not having any issues with chest pain or shortness of breath. She is not getting any leg swelling. She has headaches chronically from her sinusitis.      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

## 2018-03-21 ENCOUNTER — OUTSIDE SERVICES (OUTPATIENT)
Dept: FAMILY MEDICINE CLINIC | Age: 83
End: 2018-03-21
Payer: MEDICARE

## 2018-03-21 DIAGNOSIS — F41.9 ANXIETY: ICD-10-CM

## 2018-03-21 DIAGNOSIS — J41.8 MIXED SIMPLE AND MUCOPURULENT CHRONIC BRONCHITIS (HCC): ICD-10-CM

## 2018-03-21 DIAGNOSIS — F03.90 DEMENTIA WITHOUT BEHAVIORAL DISTURBANCE, UNSPECIFIED DEMENTIA TYPE: Primary | ICD-10-CM

## 2018-03-21 DIAGNOSIS — I10 ESSENTIAL HYPERTENSION: ICD-10-CM

## 2018-03-22 PROCEDURE — 99309 SBSQ NF CARE MODERATE MDM 30: CPT | Performed by: FAMILY MEDICINE

## 2018-03-22 ASSESSMENT — ENCOUNTER SYMPTOMS
COUGH: 0
VOICE CHANGE: 0
DIARRHEA: 0
CHEST TIGHTNESS: 0
SINUS PRESSURE: 1
BACK PAIN: 0
WHEEZING: 0
CONSTIPATION: 0
SHORTNESS OF BREATH: 1
ABDOMINAL PAIN: 0

## 2018-03-22 NOTE — PROGRESS NOTES
[address] [facility]  Dept: [phone]  Dept Fax: [phone]  Loc: [phone]    Evon Mann is a 80 y.o. female who presents today for her medical conditions/complaints as noted below. Evon Mann is c/o of   Chief Complaint   Patient presents with    Hypertension    COPD    Anxiety       HPI:     HPI Cookie Romero was seen for her regular monthly follow up while at [address]. She has been doing okay but today she is a lot more confused. She was disoriented when she woke up from her nap and she required several reminders for why she is at [address]. Otherwise she does not have any major complaints. She continues to say that she is short of breath and that her breathing is not good but that is baseline for her. She is eating well and her mood has been stable.        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          Social History   Substance Use Topics    Smoking status: Never Smoker    Smokeless tobacco: Never Used    Alcohol use No      Current Outpatient Prescriptions   Medication Sig Dispense Refill    Multiple Vitamin (MULTI VITAMIN DAILY) TABS Take 1 tablet by mouth daily Take 1 tab po daily 30 tablet 11    fluticasone (FLONASE) 50 MCG/ACT nasal spray 1 spray by Nasal route daily 1 Bottle 11    LORazepam (ATIVAN) 0.5 MG tablet Take 1 tablet by mouth every 6 hours as needed for Anxiety 90 tablet 2    hydrochlorothiazide (MICROZIDE) 12.5 MG capsule take 1 capsule by mouth once daily 30 capsule 11    Calcium Carbonate (CALCIUM 600 PO) 2 puffs by mouth every 6 hours if needed for wheezing 1 Inhaler 11    OXYGEN Inhale 2 L/min into the lungs nightly. No current facility-administered medications for this visit. Allergies   Allergen Reactions    Amlodipine Other (See Comments)     Pt says she got all the side effects      Sulfa Antibiotics     Cleocin [Clindamycin] Rash    Macrolides And Ketolides Rash     \"All mycins\"    Pcn [Penicillins] Rash    Tetracyclines & Related Rash    Zithromax [Azithromycin] Rash       Subjective:      Review of Systems   Constitutional: Negative for activity change, appetite change, chills, fatigue and fever. HENT: Positive for sinus pressure (chronic). Negative for congestion, postnasal drip, sneezing and voice change. Eyes: Negative for visual disturbance. Respiratory: Positive for shortness of breath (chronic; slightly worse). Negative for cough, chest tightness and wheezing. Cardiovascular: Negative for chest pain, palpitations and leg swelling. Gastrointestinal: Negative for abdominal pain, constipation and diarrhea. Genitourinary: Negative for difficulty urinating. Musculoskeletal: Negative for back pain and myalgias. Skin: Negative for rash. Allergic/Immunologic: Positive for environmental allergies. Neurological: Negative for dizziness, syncope, weakness, light-headedness and headaches. Psychiatric/Behavioral: Positive for confusion (chronic, but worse today) and decreased concentration. Negative for dysphoric mood and sleep disturbance. The patient is not nervous/anxious. Objective:     Physical Exam   Constitutional: She is oriented to person, place, and time. She appears well-developed and well-nourished. No distress. HENT:   Mouth/Throat: Oropharynx is clear and moist. No oropharyngeal exudate. Eyes: Conjunctivae and EOM are normal. Pupils are equal, round, and reactive to light. Neck: Normal range of motion. Neck supple.    Cardiovascular: Normal rate,

## 2018-04-20 ENCOUNTER — APPOINTMENT (OUTPATIENT)
Dept: CT IMAGING | Age: 83
End: 2018-04-20
Payer: MEDICARE

## 2018-04-20 ENCOUNTER — APPOINTMENT (OUTPATIENT)
Dept: GENERAL RADIOLOGY | Age: 83
End: 2018-04-20
Payer: MEDICARE

## 2018-04-20 ENCOUNTER — HOSPITAL ENCOUNTER (OUTPATIENT)
Age: 83
Setting detail: OBSERVATION
Discharge: NURSING, REHAB OR LONG-TERM CARE | End: 2018-04-21
Attending: EMERGENCY MEDICINE | Admitting: FAMILY MEDICINE
Payer: MEDICARE

## 2018-04-20 DIAGNOSIS — R07.9 CHEST PAIN, UNSPECIFIED TYPE: Primary | ICD-10-CM

## 2018-04-20 PROBLEM — I44.7 LEFT BUNDLE BRANCH BLOCK: Status: ACTIVE | Noted: 2018-04-20

## 2018-04-20 PROBLEM — I16.0 HYPERTENSIVE URGENCY: Status: ACTIVE | Noted: 2018-04-20

## 2018-04-20 LAB
-: ABNORMAL
ABSOLUTE EOS #: 0.2 K/UL (ref 0–0.4)
ABSOLUTE IMMATURE GRANULOCYTE: NORMAL K/UL (ref 0–0.3)
ABSOLUTE LYMPH #: 1.2 K/UL (ref 1–4.8)
ABSOLUTE MONO #: 0.7 K/UL (ref 0.1–1.2)
AMORPHOUS: ABNORMAL
ANION GAP SERPL CALCULATED.3IONS-SCNC: 14 MMOL/L (ref 9–17)
BACTERIA: ABNORMAL
BASOPHILS # BLD: 1 % (ref 0–1)
BASOPHILS ABSOLUTE: 0.1 K/UL (ref 0–0.2)
BILIRUBIN URINE: NEGATIVE
BUN BLDV-MCNC: 9 MG/DL (ref 8–23)
BUN/CREAT BLD: 16 (ref 9–20)
CALCIUM SERPL-MCNC: 10.3 MG/DL (ref 8.6–10.4)
CASTS UA: ABNORMAL /LPF (ref 0–2)
CHLORIDE BLD-SCNC: 93 MMOL/L (ref 98–107)
CO2: 25 MMOL/L (ref 20–31)
COLOR: ABNORMAL
COMMENT UA: ABNORMAL
CREAT SERPL-MCNC: 0.57 MG/DL (ref 0.5–0.9)
CRYSTALS, UA: ABNORMAL /HPF
D DIMER: 363 NG/ML
DIFFERENTIAL TYPE: NORMAL
EKG ATRIAL RATE: 69 BPM
EKG P AXIS: 54 DEGREES
EKG P-R INTERVAL: 190 MS
EKG Q-T INTERVAL: 460 MS
EKG QRS DURATION: 132 MS
EKG QTC CALCULATION (BAZETT): 492 MS
EKG R AXIS: -57 DEGREES
EKG T AXIS: 86 DEGREES
EKG VENTRICULAR RATE: 69 BPM
EOSINOPHILS RELATIVE PERCENT: 3 % (ref 1–7)
EPITHELIAL CELLS UA: ABNORMAL /HPF (ref 0–5)
GFR AFRICAN AMERICAN: >60 ML/MIN
GFR NON-AFRICAN AMERICAN: >60 ML/MIN
GFR SERPL CREATININE-BSD FRML MDRD: ABNORMAL ML/MIN/{1.73_M2}
GFR SERPL CREATININE-BSD FRML MDRD: ABNORMAL ML/MIN/{1.73_M2}
GLUCOSE BLD-MCNC: 117 MG/DL (ref 70–99)
GLUCOSE URINE: NEGATIVE
HCT VFR BLD CALC: 41 % (ref 36–46)
HEMOGLOBIN: 13.8 G/DL (ref 12–16)
IMMATURE GRANULOCYTES: NORMAL %
KETONES, URINE: NEGATIVE
LEUKOCYTE ESTERASE, URINE: NEGATIVE
LYMPHOCYTES # BLD: 18 % (ref 16–46)
MCH RBC QN AUTO: 30.3 PG (ref 26–34)
MCHC RBC AUTO-ENTMCNC: 33.8 G/DL (ref 31–37)
MCV RBC AUTO: 89.8 FL (ref 80–100)
MONOCYTES # BLD: 10 % (ref 4–11)
MUCUS: ABNORMAL
NITRITE, URINE: NEGATIVE
NRBC AUTOMATED: NORMAL PER 100 WBC
OTHER OBSERVATIONS UA: ABNORMAL
PDW BLD-RTO: 13.2 % (ref 11–14.5)
PH UA: 8 (ref 5–6)
PLATELET # BLD: 234 K/UL (ref 140–450)
PLATELET ESTIMATE: NORMAL
PMV BLD AUTO: 7.8 FL (ref 6–12)
POTASSIUM SERPL-SCNC: 3.9 MMOL/L (ref 3.7–5.3)
PROTEIN UA: NEGATIVE
RBC # BLD: 4.57 M/UL (ref 4–5.2)
RBC # BLD: NORMAL 10*6/UL
RBC UA: ABNORMAL /HPF (ref 0–4)
RENAL EPITHELIAL, UA: ABNORMAL /HPF
SEG NEUTROPHILS: 68 % (ref 43–77)
SEGMENTED NEUTROPHILS ABSOLUTE COUNT: 4.6 K/UL (ref 1.8–7.7)
SODIUM BLD-SCNC: 132 MMOL/L (ref 135–144)
SPECIFIC GRAVITY UA: 1.01 (ref 1.01–1.02)
TRICHOMONAS: ABNORMAL
TROPONIN INTERP: NORMAL
TROPONIN INTERP: NORMAL
TROPONIN T: <0.03 NG/ML
TROPONIN T: <0.03 NG/ML
TURBIDITY: ABNORMAL
URINE HGB: NEGATIVE
UROBILINOGEN, URINE: NORMAL
WBC # BLD: 6.7 K/UL (ref 3.5–11)
WBC # BLD: NORMAL 10*3/UL
WBC UA: ABNORMAL /HPF (ref 0–4)
YEAST: ABNORMAL

## 2018-04-20 PROCEDURE — 85379 FIBRIN DEGRADATION QUANT: CPT

## 2018-04-20 PROCEDURE — 93005 ELECTROCARDIOGRAM TRACING: CPT

## 2018-04-20 PROCEDURE — G0378 HOSPITAL OBSERVATION PER HR: HCPCS

## 2018-04-20 PROCEDURE — 94760 N-INVAS EAR/PLS OXIMETRY 1: CPT

## 2018-04-20 PROCEDURE — 84484 ASSAY OF TROPONIN QUANT: CPT

## 2018-04-20 PROCEDURE — 2580000003 HC RX 258: Performed by: NURSE PRACTITIONER

## 2018-04-20 PROCEDURE — 70450 CT HEAD/BRAIN W/O DYE: CPT

## 2018-04-20 PROCEDURE — 81001 URINALYSIS AUTO W/SCOPE: CPT

## 2018-04-20 PROCEDURE — 71045 X-RAY EXAM CHEST 1 VIEW: CPT

## 2018-04-20 PROCEDURE — 2500000003 HC RX 250 WO HCPCS

## 2018-04-20 PROCEDURE — 80048 BASIC METABOLIC PNL TOTAL CA: CPT

## 2018-04-20 PROCEDURE — 36415 COLL VENOUS BLD VENIPUNCTURE: CPT

## 2018-04-20 PROCEDURE — 96374 THER/PROPH/DIAG INJ IV PUSH: CPT

## 2018-04-20 PROCEDURE — 6370000000 HC RX 637 (ALT 250 FOR IP): Performed by: NURSE PRACTITIONER

## 2018-04-20 PROCEDURE — 85025 COMPLETE CBC W/AUTO DIFF WBC: CPT

## 2018-04-20 PROCEDURE — 99285 EMERGENCY DEPT VISIT HI MDM: CPT

## 2018-04-20 RX ORDER — SODIUM CHLORIDE 0.9 % (FLUSH) 0.9 %
10 SYRINGE (ML) INJECTION EVERY 12 HOURS SCHEDULED
Status: DISCONTINUED | OUTPATIENT
Start: 2018-04-20 | End: 2018-04-21 | Stop reason: HOSPADM

## 2018-04-20 RX ORDER — ONDANSETRON 2 MG/ML
4 INJECTION INTRAMUSCULAR; INTRAVENOUS EVERY 6 HOURS PRN
Status: DISCONTINUED | OUTPATIENT
Start: 2018-04-20 | End: 2018-04-21 | Stop reason: HOSPADM

## 2018-04-20 RX ORDER — CETIRIZINE HYDROCHLORIDE 5 MG/1
10 TABLET ORAL DAILY
Status: DISCONTINUED | OUTPATIENT
Start: 2018-04-21 | End: 2018-04-21 | Stop reason: HOSPADM

## 2018-04-20 RX ORDER — MULTIVITAMIN WITH FOLIC ACID 400 MCG
1 TABLET ORAL DAILY
Status: DISCONTINUED | OUTPATIENT
Start: 2018-04-21 | End: 2018-04-21 | Stop reason: HOSPADM

## 2018-04-20 RX ORDER — ACETAMINOPHEN 325 MG/1
650 TABLET ORAL EVERY 4 HOURS PRN
Status: DISCONTINUED | OUTPATIENT
Start: 2018-04-20 | End: 2018-04-21 | Stop reason: HOSPADM

## 2018-04-20 RX ORDER — ISOSORBIDE MONONITRATE 30 MG/1
30 TABLET, EXTENDED RELEASE ORAL DAILY
Status: DISCONTINUED | OUTPATIENT
Start: 2018-04-21 | End: 2018-04-21 | Stop reason: HOSPADM

## 2018-04-20 RX ORDER — ASPIRIN 81 MG/1
81 TABLET, CHEWABLE ORAL DAILY
Status: DISCONTINUED | OUTPATIENT
Start: 2018-04-21 | End: 2018-04-21 | Stop reason: HOSPADM

## 2018-04-20 RX ORDER — BUSPIRONE HYDROCHLORIDE 5 MG/1
10 TABLET ORAL 2 TIMES DAILY
Status: DISCONTINUED | OUTPATIENT
Start: 2018-04-20 | End: 2018-04-21 | Stop reason: HOSPADM

## 2018-04-20 RX ORDER — METOPROLOL TARTRATE 5 MG/5ML
5 INJECTION INTRAVENOUS ONCE
Status: COMPLETED | OUTPATIENT
Start: 2018-04-20 | End: 2018-04-20

## 2018-04-20 RX ORDER — LORAZEPAM 0.5 MG/1
0.5 TABLET ORAL EVERY 6 HOURS PRN
Status: DISCONTINUED | OUTPATIENT
Start: 2018-04-20 | End: 2018-04-21 | Stop reason: HOSPADM

## 2018-04-20 RX ORDER — HYDROCHLOROTHIAZIDE 12.5 MG/1
12.5 TABLET ORAL DAILY
Status: DISCONTINUED | OUTPATIENT
Start: 2018-04-21 | End: 2018-04-21 | Stop reason: HOSPADM

## 2018-04-20 RX ORDER — SODIUM CHLORIDE 0.9 % (FLUSH) 0.9 %
10 SYRINGE (ML) INJECTION PRN
Status: DISCONTINUED | OUTPATIENT
Start: 2018-04-20 | End: 2018-04-21 | Stop reason: HOSPADM

## 2018-04-20 RX ORDER — ALBUTEROL SULFATE 2.5 MG/3ML
2.5 SOLUTION RESPIRATORY (INHALATION)
Status: DISCONTINUED | OUTPATIENT
Start: 2018-04-20 | End: 2018-04-21 | Stop reason: HOSPADM

## 2018-04-20 RX ORDER — POTASSIUM CHLORIDE 20 MEQ/1
20 TABLET, EXTENDED RELEASE ORAL 2 TIMES DAILY
Status: DISCONTINUED | OUTPATIENT
Start: 2018-04-20 | End: 2018-04-21 | Stop reason: HOSPADM

## 2018-04-20 RX ORDER — LISINOPRIL 5 MG/1
5 TABLET ORAL 2 TIMES DAILY
Status: DISCONTINUED | OUTPATIENT
Start: 2018-04-20 | End: 2018-04-21 | Stop reason: HOSPADM

## 2018-04-20 RX ORDER — DONEPEZIL HYDROCHLORIDE 5 MG/1
10 TABLET, FILM COATED ORAL NIGHTLY
Status: DISCONTINUED | OUTPATIENT
Start: 2018-04-20 | End: 2018-04-21 | Stop reason: HOSPADM

## 2018-04-20 RX ORDER — METOPROLOL TARTRATE 5 MG/5ML
INJECTION INTRAVENOUS
Status: COMPLETED
Start: 2018-04-20 | End: 2018-04-20

## 2018-04-20 RX ORDER — LEVOTHYROXINE SODIUM 0.03 MG/1
50 TABLET ORAL DAILY
Status: DISCONTINUED | OUTPATIENT
Start: 2018-04-21 | End: 2018-04-21 | Stop reason: HOSPADM

## 2018-04-20 RX ORDER — FLUTICASONE PROPIONATE 50 MCG
1 SPRAY, SUSPENSION (ML) NASAL DAILY
Status: DISCONTINUED | OUTPATIENT
Start: 2018-04-21 | End: 2018-04-21 | Stop reason: HOSPADM

## 2018-04-20 RX ADMIN — POTASSIUM CHLORIDE 20 MEQ: 20 TABLET, EXTENDED RELEASE ORAL at 22:52

## 2018-04-20 RX ADMIN — DONEPEZIL HYDROCHLORIDE 10 MG: 5 TABLET, FILM COATED ORAL at 22:52

## 2018-04-20 RX ADMIN — BUSPIRONE HYDROCHLORIDE 10 MG: 5 TABLET ORAL at 22:53

## 2018-04-20 RX ADMIN — METOROPROLOL TARTRATE 5 MG: 5 INJECTION, SOLUTION INTRAVENOUS at 21:08

## 2018-04-20 RX ADMIN — LISINOPRIL 5 MG: 5 TABLET ORAL at 22:53

## 2018-04-20 RX ADMIN — Medication 10 ML: at 21:10

## 2018-04-20 RX ADMIN — METOPROLOL TARTRATE 5 MG: 5 INJECTION INTRAVENOUS at 21:08

## 2018-04-20 ASSESSMENT — PAIN DESCRIPTION - PROGRESSION
CLINICAL_PROGRESSION: GRADUALLY WORSENING
CLINICAL_PROGRESSION: NOT CHANGED

## 2018-04-20 ASSESSMENT — PAIN SCALES - GENERAL
PAINLEVEL_OUTOF10: 7
PAINLEVEL_OUTOF10: 5

## 2018-04-20 ASSESSMENT — PAIN DESCRIPTION - ORIENTATION
ORIENTATION: LEFT
ORIENTATION: LEFT

## 2018-04-20 ASSESSMENT — PAIN DESCRIPTION - ONSET
ONSET: ON-GOING
ONSET: ON-GOING

## 2018-04-20 ASSESSMENT — PAIN DESCRIPTION - PAIN TYPE
TYPE: ACUTE PAIN
TYPE: ACUTE PAIN

## 2018-04-20 ASSESSMENT — PAIN DESCRIPTION - DESCRIPTORS
DESCRIPTORS: HEAVINESS
DESCRIPTORS: STABBING;DISCOMFORT

## 2018-04-20 ASSESSMENT — PAIN DESCRIPTION - LOCATION
LOCATION: CHEST
LOCATION: CHEST

## 2018-04-20 ASSESSMENT — PAIN DESCRIPTION - FREQUENCY
FREQUENCY: CONTINUOUS
FREQUENCY: INTERMITTENT

## 2018-04-20 ASSESSMENT — ENCOUNTER SYMPTOMS: SHORTNESS OF BREATH: 1

## 2018-04-21 VITALS
OXYGEN SATURATION: 95 % | TEMPERATURE: 98.7 F | WEIGHT: 127.6 LBS | BODY MASS INDEX: 21.78 KG/M2 | HEART RATE: 62 BPM | HEIGHT: 64 IN | RESPIRATION RATE: 18 BRPM | SYSTOLIC BLOOD PRESSURE: 121 MMHG | DIASTOLIC BLOOD PRESSURE: 62 MMHG

## 2018-04-21 LAB
ABSOLUTE EOS #: 0.3 K/UL (ref 0–0.4)
ABSOLUTE IMMATURE GRANULOCYTE: ABNORMAL K/UL (ref 0–0.3)
ABSOLUTE LYMPH #: 1.3 K/UL (ref 1–4.8)
ABSOLUTE MONO #: 0.8 K/UL (ref 0.1–1.2)
ANION GAP SERPL CALCULATED.3IONS-SCNC: 10 MMOL/L (ref 9–17)
BASOPHILS # BLD: 1 % (ref 0–1)
BASOPHILS ABSOLUTE: 0.1 K/UL (ref 0–0.2)
BUN BLDV-MCNC: 9 MG/DL (ref 8–23)
BUN/CREAT BLD: 15 (ref 9–20)
CALCIUM SERPL-MCNC: 9.5 MG/DL (ref 8.6–10.4)
CHLORIDE BLD-SCNC: 96 MMOL/L (ref 98–107)
CO2: 27 MMOL/L (ref 20–31)
CREAT SERPL-MCNC: 0.59 MG/DL (ref 0.5–0.9)
DIFFERENTIAL TYPE: ABNORMAL
EOSINOPHILS RELATIVE PERCENT: 3 % (ref 1–7)
GFR AFRICAN AMERICAN: >60 ML/MIN
GFR NON-AFRICAN AMERICAN: >60 ML/MIN
GFR SERPL CREATININE-BSD FRML MDRD: ABNORMAL ML/MIN/{1.73_M2}
GFR SERPL CREATININE-BSD FRML MDRD: ABNORMAL ML/MIN/{1.73_M2}
GLUCOSE BLD-MCNC: 112 MG/DL (ref 70–99)
HCT VFR BLD CALC: 43.3 % (ref 36–46)
HEMOGLOBIN: 14.7 G/DL (ref 12–16)
IMMATURE GRANULOCYTES: ABNORMAL %
LYMPHOCYTES # BLD: 14 % (ref 16–46)
MCH RBC QN AUTO: 30.5 PG (ref 26–34)
MCHC RBC AUTO-ENTMCNC: 34 G/DL (ref 31–37)
MCV RBC AUTO: 89.7 FL (ref 80–100)
MONOCYTES # BLD: 9 % (ref 4–11)
NRBC AUTOMATED: ABNORMAL PER 100 WBC
PDW BLD-RTO: 13.5 % (ref 11–14.5)
PLATELET # BLD: 245 K/UL (ref 140–450)
PLATELET ESTIMATE: ABNORMAL
PMV BLD AUTO: 8.9 FL (ref 6–12)
POTASSIUM SERPL-SCNC: 4.8 MMOL/L (ref 3.7–5.3)
RBC # BLD: 4.83 M/UL (ref 4–5.2)
RBC # BLD: ABNORMAL 10*6/UL
SEG NEUTROPHILS: 72 % (ref 43–77)
SEGMENTED NEUTROPHILS ABSOLUTE COUNT: 6.5 K/UL (ref 1.8–7.7)
SODIUM BLD-SCNC: 133 MMOL/L (ref 135–144)
THYROXINE, FREE: 1.14 NG/DL (ref 0.93–1.7)
TROPONIN INTERP: NORMAL
TROPONIN INTERP: NORMAL
TROPONIN T: <0.03 NG/ML
TROPONIN T: <0.03 NG/ML
TSH SERPL DL<=0.05 MIU/L-ACNC: 0.73 MIU/L (ref 0.3–5)
WBC # BLD: 8.8 K/UL (ref 3.5–11)
WBC # BLD: ABNORMAL 10*3/UL

## 2018-04-21 PROCEDURE — G0378 HOSPITAL OBSERVATION PER HR: HCPCS

## 2018-04-21 PROCEDURE — 84439 ASSAY OF FREE THYROXINE: CPT

## 2018-04-21 PROCEDURE — 84484 ASSAY OF TROPONIN QUANT: CPT

## 2018-04-21 PROCEDURE — 94760 N-INVAS EAR/PLS OXIMETRY 1: CPT

## 2018-04-21 PROCEDURE — 94640 AIRWAY INHALATION TREATMENT: CPT

## 2018-04-21 PROCEDURE — 80048 BASIC METABOLIC PNL TOTAL CA: CPT

## 2018-04-21 PROCEDURE — 93005 ELECTROCARDIOGRAM TRACING: CPT

## 2018-04-21 PROCEDURE — 96372 THER/PROPH/DIAG INJ SC/IM: CPT

## 2018-04-21 PROCEDURE — 85025 COMPLETE CBC W/AUTO DIFF WBC: CPT

## 2018-04-21 PROCEDURE — 84443 ASSAY THYROID STIM HORMONE: CPT

## 2018-04-21 PROCEDURE — 6370000000 HC RX 637 (ALT 250 FOR IP): Performed by: NURSE PRACTITIONER

## 2018-04-21 PROCEDURE — 6360000002 HC RX W HCPCS: Performed by: NURSE PRACTITIONER

## 2018-04-21 PROCEDURE — 99217 PR OBSERVATION CARE DISCHARGE MANAGEMENT: CPT | Performed by: FAMILY MEDICINE

## 2018-04-21 PROCEDURE — 36415 COLL VENOUS BLD VENIPUNCTURE: CPT

## 2018-04-21 PROCEDURE — 2580000003 HC RX 258: Performed by: NURSE PRACTITIONER

## 2018-04-21 RX ORDER — LORAZEPAM 0.5 MG/1
0.5 TABLET ORAL EVERY 6 HOURS PRN
Qty: 28 TABLET | Refills: 0 | Status: SHIPPED | OUTPATIENT
Start: 2018-04-21 | End: 2018-04-27

## 2018-04-21 RX ORDER — METOPROLOL TARTRATE 50 MG/1
TABLET, FILM COATED ORAL
Status: ON HOLD | DISCHARGE
Start: 2018-04-21 | End: 2018-05-09 | Stop reason: HOSPADM

## 2018-04-21 RX ADMIN — ENOXAPARIN SODIUM 60 MG: 60 INJECTION SUBCUTANEOUS at 09:43

## 2018-04-21 RX ADMIN — ASPIRIN 81 MG 81 MG: 81 TABLET ORAL at 09:43

## 2018-04-21 RX ADMIN — LEVOTHYROXINE SODIUM 50 MCG: 25 TABLET ORAL at 05:36

## 2018-04-21 RX ADMIN — METOPROLOL TARTRATE 25 MG: 25 TABLET ORAL at 09:42

## 2018-04-21 RX ADMIN — FLUTICASONE PROPIONATE 1 SPRAY: 50 SPRAY, METERED NASAL at 09:43

## 2018-04-21 RX ADMIN — BUSPIRONE HYDROCHLORIDE 10 MG: 5 TABLET ORAL at 09:42

## 2018-04-21 RX ADMIN — ISOSORBIDE MONONITRATE 30 MG: 30 TABLET, EXTENDED RELEASE ORAL at 09:43

## 2018-04-21 RX ADMIN — MOMETASONE FUROATE AND FORMOTEROL FUMARATE DIHYDRATE 2 PUFF: 100; 5 AEROSOL RESPIRATORY (INHALATION) at 09:08

## 2018-04-21 RX ADMIN — LORAZEPAM 0.5 MG: 0.5 TABLET ORAL at 05:37

## 2018-04-21 RX ADMIN — CETIRIZINE HYDROCHLORIDE 10 MG: 5 TABLET, FILM COATED ORAL at 09:43

## 2018-04-21 RX ADMIN — METOPROLOL TARTRATE 25 MG: 25 TABLET ORAL at 01:24

## 2018-04-21 RX ADMIN — POTASSIUM CHLORIDE 20 MEQ: 20 TABLET, EXTENDED RELEASE ORAL at 09:43

## 2018-04-21 RX ADMIN — Medication 10 ML: at 09:44

## 2018-04-21 RX ADMIN — LISINOPRIL 5 MG: 5 TABLET ORAL at 05:39

## 2018-04-21 RX ADMIN — HYDROCHLOROTHIAZIDE 12.5 MG: 12.5 TABLET ORAL at 09:43

## 2018-04-21 RX ADMIN — THERA TABS 1 TABLET: TAB at 09:43

## 2018-04-21 ASSESSMENT — PAIN SCALES - GENERAL
PAINLEVEL_OUTOF10: 0

## 2018-04-22 LAB
EKG ATRIAL RATE: 61 BPM
EKG P AXIS: 50 DEGREES
EKG P-R INTERVAL: 200 MS
EKG Q-T INTERVAL: 504 MS
EKG QRS DURATION: 128 MS
EKG QTC CALCULATION (BAZETT): 507 MS
EKG R AXIS: -62 DEGREES
EKG T AXIS: 84 DEGREES
EKG VENTRICULAR RATE: 61 BPM

## 2018-04-26 ENCOUNTER — OUTSIDE SERVICES (OUTPATIENT)
Dept: FAMILY MEDICINE CLINIC | Age: 83
End: 2018-04-26
Payer: MEDICARE

## 2018-04-26 DIAGNOSIS — F03.90 DEMENTIA WITHOUT BEHAVIORAL DISTURBANCE, UNSPECIFIED DEMENTIA TYPE: ICD-10-CM

## 2018-04-26 DIAGNOSIS — F22 PARANOIA (HCC): ICD-10-CM

## 2018-04-26 DIAGNOSIS — J41.1 MUCOPURULENT CHRONIC BRONCHITIS (HCC): ICD-10-CM

## 2018-04-26 DIAGNOSIS — F41.9 ANXIETY: Primary | ICD-10-CM

## 2018-04-26 PROBLEM — J41.8 MIXED SIMPLE AND MUCOPURULENT CHRONIC BRONCHITIS (HCC): Status: RESOLVED | Noted: 2017-11-08 | Resolved: 2018-04-26

## 2018-04-27 PROCEDURE — 99309 SBSQ NF CARE MODERATE MDM 30: CPT | Performed by: FAMILY MEDICINE

## 2018-04-27 RX ORDER — BUSPIRONE HYDROCHLORIDE 10 MG/1
10 TABLET ORAL 3 TIMES DAILY
Qty: 60 TABLET | Refills: 11 | Status: SHIPPED
Start: 2018-04-27 | End: 2018-05-17 | Stop reason: SDUPTHER

## 2018-04-27 ASSESSMENT — ENCOUNTER SYMPTOMS
DIARRHEA: 0
BACK PAIN: 0
ABDOMINAL PAIN: 0
WHEEZING: 0
SINUS PRESSURE: 1
VOICE CHANGE: 0
COUGH: 0
SHORTNESS OF BREATH: 1
CHEST TIGHTNESS: 0
CONSTIPATION: 0

## 2018-05-07 ENCOUNTER — HOSPITAL ENCOUNTER (INPATIENT)
Age: 83
LOS: 2 days | Discharge: NURSING, REHAB OR LONG-TERM CARE | DRG: 305 | End: 2018-05-09
Attending: EMERGENCY MEDICINE | Admitting: FAMILY MEDICINE
Payer: MEDICARE

## 2018-05-07 ENCOUNTER — APPOINTMENT (OUTPATIENT)
Dept: CT IMAGING | Age: 83
DRG: 305 | End: 2018-05-07
Payer: MEDICARE

## 2018-05-07 ENCOUNTER — APPOINTMENT (OUTPATIENT)
Dept: GENERAL RADIOLOGY | Age: 83
DRG: 305 | End: 2018-05-07
Payer: MEDICARE

## 2018-05-07 DIAGNOSIS — I16.1 HYPERTENSIVE EMERGENCY: Primary | ICD-10-CM

## 2018-05-07 DIAGNOSIS — R07.9 CHEST PAIN, UNSPECIFIED TYPE: ICD-10-CM

## 2018-05-07 DIAGNOSIS — R20.0 FACIAL NUMBNESS: ICD-10-CM

## 2018-05-07 LAB
ABSOLUTE EOS #: 0.2 K/UL (ref 0–0.4)
ABSOLUTE IMMATURE GRANULOCYTE: NORMAL K/UL (ref 0–0.3)
ABSOLUTE LYMPH #: 1.8 K/UL (ref 1–4.8)
ABSOLUTE MONO #: 0.6 K/UL (ref 0.1–1.2)
ANION GAP SERPL CALCULATED.3IONS-SCNC: 15 MMOL/L (ref 9–17)
BASOPHILS # BLD: 1 % (ref 0–1)
BASOPHILS ABSOLUTE: 0 K/UL (ref 0–0.2)
BNP INTERPRETATION: ABNORMAL
BUN BLDV-MCNC: 8 MG/DL (ref 8–23)
BUN/CREAT BLD: 11 (ref 9–20)
CALCIUM SERPL-MCNC: 10.6 MG/DL (ref 8.6–10.4)
CHLORIDE BLD-SCNC: 97 MMOL/L (ref 98–107)
CO2: 26 MMOL/L (ref 20–31)
CREAT SERPL-MCNC: 0.73 MG/DL (ref 0.5–0.9)
DIFFERENTIAL TYPE: NORMAL
EKG ATRIAL RATE: 70 BPM
EKG P AXIS: 59 DEGREES
EKG P-R INTERVAL: 190 MS
EKG Q-T INTERVAL: 456 MS
EKG QRS DURATION: 128 MS
EKG QTC CALCULATION (BAZETT): 492 MS
EKG R AXIS: -62 DEGREES
EKG T AXIS: 91 DEGREES
EKG VENTRICULAR RATE: 70 BPM
EOSINOPHILS RELATIVE PERCENT: 2 % (ref 1–7)
GFR AFRICAN AMERICAN: >60 ML/MIN
GFR NON-AFRICAN AMERICAN: >60 ML/MIN
GFR SERPL CREATININE-BSD FRML MDRD: ABNORMAL ML/MIN/{1.73_M2}
GFR SERPL CREATININE-BSD FRML MDRD: ABNORMAL ML/MIN/{1.73_M2}
GLUCOSE BLD-MCNC: 113 MG/DL (ref 70–99)
HCT VFR BLD CALC: 42.1 % (ref 36–46)
HEMOGLOBIN: 14 G/DL (ref 12–16)
IMMATURE GRANULOCYTES: NORMAL %
INR BLD: 1
LYMPHOCYTES # BLD: 26 % (ref 16–46)
MCH RBC QN AUTO: 30.5 PG (ref 26–34)
MCHC RBC AUTO-ENTMCNC: 33.3 G/DL (ref 31–37)
MCV RBC AUTO: 91.7 FL (ref 80–100)
MONOCYTES # BLD: 9 % (ref 4–11)
NRBC AUTOMATED: NORMAL PER 100 WBC
PARTIAL THROMBOPLASTIN TIME: 28.8 SEC (ref 27–35)
PDW BLD-RTO: 13.6 % (ref 11–14.5)
PLATELET # BLD: 255 K/UL (ref 140–450)
PLATELET ESTIMATE: NORMAL
PMV BLD AUTO: 7.6 FL (ref 6–12)
POTASSIUM SERPL-SCNC: 4.4 MMOL/L (ref 3.7–5.3)
PRO-BNP: 403 PG/ML
PROTHROMBIN TIME: 10.4 SEC (ref 9.4–11.3)
RBC # BLD: 4.59 M/UL (ref 4–5.2)
RBC # BLD: NORMAL 10*6/UL
SEG NEUTROPHILS: 62 % (ref 43–77)
SEGMENTED NEUTROPHILS ABSOLUTE COUNT: 4.3 K/UL (ref 1.8–7.7)
SODIUM BLD-SCNC: 138 MMOL/L (ref 135–144)
TROPONIN INTERP: NORMAL
TROPONIN INTERP: NORMAL
TROPONIN T: <0.03 NG/ML
TROPONIN T: <0.03 NG/ML
WBC # BLD: 7 K/UL (ref 3.5–11)
WBC # BLD: NORMAL 10*3/UL

## 2018-05-07 PROCEDURE — 84484 ASSAY OF TROPONIN QUANT: CPT

## 2018-05-07 PROCEDURE — 83880 ASSAY OF NATRIURETIC PEPTIDE: CPT

## 2018-05-07 PROCEDURE — 96374 THER/PROPH/DIAG INJ IV PUSH: CPT

## 2018-05-07 PROCEDURE — 6360000002 HC RX W HCPCS: Performed by: PHYSICIAN ASSISTANT

## 2018-05-07 PROCEDURE — 2580000003 HC RX 258: Performed by: PHYSICIAN ASSISTANT

## 2018-05-07 PROCEDURE — 96375 TX/PRO/DX INJ NEW DRUG ADDON: CPT

## 2018-05-07 PROCEDURE — 80048 BASIC METABOLIC PNL TOTAL CA: CPT

## 2018-05-07 PROCEDURE — 99285 EMERGENCY DEPT VISIT HI MDM: CPT

## 2018-05-07 PROCEDURE — 93005 ELECTROCARDIOGRAM TRACING: CPT

## 2018-05-07 PROCEDURE — G0378 HOSPITAL OBSERVATION PER HR: HCPCS

## 2018-05-07 PROCEDURE — 85025 COMPLETE CBC W/AUTO DIFF WBC: CPT

## 2018-05-07 PROCEDURE — 70450 CT HEAD/BRAIN W/O DYE: CPT

## 2018-05-07 PROCEDURE — 71045 X-RAY EXAM CHEST 1 VIEW: CPT

## 2018-05-07 PROCEDURE — 85610 PROTHROMBIN TIME: CPT

## 2018-05-07 PROCEDURE — 36415 COLL VENOUS BLD VENIPUNCTURE: CPT

## 2018-05-07 PROCEDURE — 6370000000 HC RX 637 (ALT 250 FOR IP): Performed by: PHYSICIAN ASSISTANT

## 2018-05-07 PROCEDURE — 6370000000 HC RX 637 (ALT 250 FOR IP): Performed by: EMERGENCY MEDICINE

## 2018-05-07 PROCEDURE — 2500000003 HC RX 250 WO HCPCS: Performed by: EMERGENCY MEDICINE

## 2018-05-07 PROCEDURE — 2060000000 HC ICU INTERMEDIATE R&B

## 2018-05-07 PROCEDURE — 85730 THROMBOPLASTIN TIME PARTIAL: CPT

## 2018-05-07 RX ORDER — ONDANSETRON 2 MG/ML
4 INJECTION INTRAMUSCULAR; INTRAVENOUS EVERY 6 HOURS PRN
Status: DISCONTINUED | OUTPATIENT
Start: 2018-05-07 | End: 2018-05-09 | Stop reason: HOSPADM

## 2018-05-07 RX ORDER — FLUTICASONE PROPIONATE 50 MCG
1 SPRAY, SUSPENSION (ML) NASAL DAILY
Status: DISCONTINUED | OUTPATIENT
Start: 2018-05-08 | End: 2018-05-09 | Stop reason: HOSPADM

## 2018-05-07 RX ORDER — ALBUTEROL SULFATE 2.5 MG/3ML
2.5 SOLUTION RESPIRATORY (INHALATION) EVERY 6 HOURS PRN
COMMUNITY
End: 2020-06-10

## 2018-05-07 RX ORDER — POTASSIUM CHLORIDE 20 MEQ/1
20 TABLET, EXTENDED RELEASE ORAL 2 TIMES DAILY
Status: DISCONTINUED | OUTPATIENT
Start: 2018-05-07 | End: 2018-05-09 | Stop reason: HOSPADM

## 2018-05-07 RX ORDER — ALBUTEROL SULFATE 2.5 MG/3ML
2.5 SOLUTION RESPIRATORY (INHALATION) EVERY 6 HOURS PRN
Status: DISCONTINUED | OUTPATIENT
Start: 2018-05-07 | End: 2018-05-09 | Stop reason: HOSPADM

## 2018-05-07 RX ORDER — HYDRALAZINE HYDROCHLORIDE 20 MG/ML
20 INJECTION INTRAMUSCULAR; INTRAVENOUS EVERY 6 HOURS PRN
Status: DISCONTINUED | OUTPATIENT
Start: 2018-05-07 | End: 2018-05-08

## 2018-05-07 RX ORDER — BUDESONIDE AND FORMOTEROL FUMARATE DIHYDRATE 80; 4.5 UG/1; UG/1
2 AEROSOL RESPIRATORY (INHALATION) 2 TIMES DAILY
Status: ON HOLD | COMMUNITY
End: 2018-05-09 | Stop reason: HOSPADM

## 2018-05-07 RX ORDER — LISINOPRIL 5 MG/1
5 TABLET ORAL DAILY
Status: DISCONTINUED | OUTPATIENT
Start: 2018-05-08 | End: 2018-05-09 | Stop reason: HOSPADM

## 2018-05-07 RX ORDER — MAGNESIUM HYDROXIDE/ALUMINUM HYDROXICE/SIMETHICONE 120; 1200; 1200 MG/30ML; MG/30ML; MG/30ML
30 SUSPENSION ORAL EVERY 6 HOURS PRN
Status: DISCONTINUED | OUTPATIENT
Start: 2018-05-07 | End: 2018-05-09 | Stop reason: HOSPADM

## 2018-05-07 RX ORDER — LABETALOL HYDROCHLORIDE 5 MG/ML
10 INJECTION, SOLUTION INTRAVENOUS ONCE
Status: COMPLETED | OUTPATIENT
Start: 2018-05-07 | End: 2018-05-07

## 2018-05-07 RX ORDER — SODIUM CHLORIDE 9 MG/ML
INJECTION, SOLUTION INTRAVENOUS CONTINUOUS
Status: DISCONTINUED | OUTPATIENT
Start: 2018-05-07 | End: 2018-05-08

## 2018-05-07 RX ORDER — ACETAMINOPHEN 500 MG
1000 TABLET ORAL EVERY 6 HOURS PRN
Status: DISCONTINUED | OUTPATIENT
Start: 2018-05-07 | End: 2018-05-09 | Stop reason: HOSPADM

## 2018-05-07 RX ORDER — ASPIRIN 81 MG/1
81 TABLET, CHEWABLE ORAL DAILY
Status: DISCONTINUED | OUTPATIENT
Start: 2018-05-08 | End: 2018-05-09 | Stop reason: HOSPADM

## 2018-05-07 RX ORDER — HYDROCHLOROTHIAZIDE 12.5 MG/1
12.5 TABLET ORAL DAILY
Status: DISCONTINUED | OUTPATIENT
Start: 2018-05-08 | End: 2018-05-09 | Stop reason: HOSPADM

## 2018-05-07 RX ORDER — SODIUM CHLORIDE 0.9 % (FLUSH) 0.9 %
10 SYRINGE (ML) INJECTION PRN
Status: DISCONTINUED | OUTPATIENT
Start: 2018-05-07 | End: 2018-05-09 | Stop reason: HOSPADM

## 2018-05-07 RX ORDER — CETIRIZINE HYDROCHLORIDE 5 MG/1
10 TABLET ORAL DAILY
Status: DISCONTINUED | OUTPATIENT
Start: 2018-05-08 | End: 2018-05-09 | Stop reason: HOSPADM

## 2018-05-07 RX ORDER — DONEPEZIL HYDROCHLORIDE 5 MG/1
10 TABLET, FILM COATED ORAL DAILY
Status: DISCONTINUED | OUTPATIENT
Start: 2018-05-08 | End: 2018-05-09 | Stop reason: HOSPADM

## 2018-05-07 RX ORDER — ISOSORBIDE MONONITRATE 30 MG/1
30 TABLET, EXTENDED RELEASE ORAL DAILY
Status: DISCONTINUED | OUTPATIENT
Start: 2018-05-08 | End: 2018-05-09 | Stop reason: HOSPADM

## 2018-05-07 RX ORDER — ASPIRIN 81 MG/1
324 TABLET, CHEWABLE ORAL ONCE
Status: COMPLETED | OUTPATIENT
Start: 2018-05-07 | End: 2018-05-07

## 2018-05-07 RX ORDER — NITROGLYCERIN 0.4 MG/1
0.4 TABLET SUBLINGUAL EVERY 5 MIN PRN
Status: DISCONTINUED | OUTPATIENT
Start: 2018-05-07 | End: 2018-05-09 | Stop reason: HOSPADM

## 2018-05-07 RX ORDER — SODIUM CHLORIDE 0.9 % (FLUSH) 0.9 %
10 SYRINGE (ML) INJECTION EVERY 12 HOURS SCHEDULED
Status: DISCONTINUED | OUTPATIENT
Start: 2018-05-07 | End: 2018-05-09 | Stop reason: HOSPADM

## 2018-05-07 RX ORDER — LEVOTHYROXINE SODIUM 0.03 MG/1
50 TABLET ORAL DAILY
Status: DISCONTINUED | OUTPATIENT
Start: 2018-05-08 | End: 2018-05-09 | Stop reason: HOSPADM

## 2018-05-07 RX ORDER — BUSPIRONE HYDROCHLORIDE 5 MG/1
10 TABLET ORAL 3 TIMES DAILY
Status: DISCONTINUED | OUTPATIENT
Start: 2018-05-07 | End: 2018-05-09 | Stop reason: HOSPADM

## 2018-05-07 RX ADMIN — NITROGLYCERIN 0.4 MG: 0.4 TABLET SUBLINGUAL at 18:52

## 2018-05-07 RX ADMIN — BUSPIRONE HYDROCHLORIDE 10 MG: 5 TABLET ORAL at 22:05

## 2018-05-07 RX ADMIN — ASPIRIN 81 MG 324 MG: 81 TABLET ORAL at 18:19

## 2018-05-07 RX ADMIN — HYDRALAZINE HYDROCHLORIDE 20 MG: 20 INJECTION INTRAMUSCULAR; INTRAVENOUS at 23:00

## 2018-05-07 RX ADMIN — METOPROLOL TARTRATE 25 MG: 25 TABLET ORAL at 22:05

## 2018-05-07 RX ADMIN — LABETALOL HYDROCHLORIDE 10 MG: 5 INJECTION, SOLUTION INTRAVENOUS at 18:55

## 2018-05-07 RX ADMIN — NITROGLYCERIN 0.4 MG: 0.4 TABLET SUBLINGUAL at 18:19

## 2018-05-07 RX ADMIN — SODIUM CHLORIDE: 9 INJECTION, SOLUTION INTRAVENOUS at 22:06

## 2018-05-07 RX ADMIN — POTASSIUM CHLORIDE 20 MEQ: 20 TABLET, EXTENDED RELEASE ORAL at 22:05

## 2018-05-07 ASSESSMENT — PAIN DESCRIPTION - PAIN TYPE: TYPE: ACUTE PAIN

## 2018-05-07 ASSESSMENT — PAIN DESCRIPTION - ORIENTATION: ORIENTATION: LEFT

## 2018-05-07 ASSESSMENT — PAIN DESCRIPTION - LOCATION: LOCATION: CHEST

## 2018-05-07 ASSESSMENT — PAIN SCALES - GENERAL
PAINLEVEL_OUTOF10: 0
PAINLEVEL_OUTOF10: 0

## 2018-05-07 ASSESSMENT — PAIN SCALES - WONG BAKER
WONGBAKER_NUMERICALRESPONSE: 6
WONGBAKER_NUMERICALRESPONSE: 2
WONGBAKER_NUMERICALRESPONSE: 4

## 2018-05-08 LAB
-: ABNORMAL
ABSOLUTE EOS #: 0.3 K/UL (ref 0–0.4)
ABSOLUTE IMMATURE GRANULOCYTE: ABNORMAL K/UL (ref 0–0.3)
ABSOLUTE LYMPH #: 1 K/UL (ref 1–4.8)
ABSOLUTE MONO #: 0.7 K/UL (ref 0.1–1.2)
AMORPHOUS: ABNORMAL
ANION GAP SERPL CALCULATED.3IONS-SCNC: 12 MMOL/L (ref 9–17)
BACTERIA: ABNORMAL
BASOPHILS # BLD: 0 % (ref 0–1)
BASOPHILS ABSOLUTE: 0 K/UL (ref 0–0.2)
BILIRUBIN URINE: NEGATIVE
BUN BLDV-MCNC: 8 MG/DL (ref 8–23)
BUN/CREAT BLD: 13 (ref 9–20)
CALCIUM SERPL-MCNC: 9.7 MG/DL (ref 8.6–10.4)
CASTS UA: ABNORMAL /LPF (ref 0–2)
CHLORIDE BLD-SCNC: 103 MMOL/L (ref 98–107)
CO2: 24 MMOL/L (ref 20–31)
COLOR: ABNORMAL
COMMENT UA: ABNORMAL
CREAT SERPL-MCNC: 0.6 MG/DL (ref 0.5–0.9)
CRYSTALS, UA: ABNORMAL /HPF
DIFFERENTIAL TYPE: ABNORMAL
EOSINOPHILS RELATIVE PERCENT: 3 % (ref 1–7)
EPITHELIAL CELLS UA: ABNORMAL /HPF (ref 0–5)
GFR AFRICAN AMERICAN: >60 ML/MIN
GFR NON-AFRICAN AMERICAN: >60 ML/MIN
GFR SERPL CREATININE-BSD FRML MDRD: ABNORMAL ML/MIN/{1.73_M2}
GFR SERPL CREATININE-BSD FRML MDRD: ABNORMAL ML/MIN/{1.73_M2}
GLUCOSE BLD-MCNC: 117 MG/DL (ref 70–99)
GLUCOSE URINE: NEGATIVE
HCT VFR BLD CALC: 41.1 % (ref 36–46)
HEMOGLOBIN: 13.8 G/DL (ref 12–16)
IMMATURE GRANULOCYTES: ABNORMAL %
KETONES, URINE: NEGATIVE
LEUKOCYTE ESTERASE, URINE: NEGATIVE
LV EF: 65 %
LVEF MODALITY: NORMAL
LYMPHOCYTES # BLD: 13 % (ref 16–46)
MCH RBC QN AUTO: 30.5 PG (ref 26–34)
MCHC RBC AUTO-ENTMCNC: 33.6 G/DL (ref 31–37)
MCV RBC AUTO: 90.8 FL (ref 80–100)
MONOCYTES # BLD: 9 % (ref 4–11)
MUCUS: ABNORMAL
NITRITE, URINE: NEGATIVE
NRBC AUTOMATED: ABNORMAL PER 100 WBC
OTHER OBSERVATIONS UA: ABNORMAL
PDW BLD-RTO: 13.8 % (ref 11–14.5)
PH UA: 7 (ref 5–6)
PLATELET # BLD: 238 K/UL (ref 140–450)
PLATELET ESTIMATE: ABNORMAL
PMV BLD AUTO: 8.1 FL (ref 6–12)
POTASSIUM SERPL-SCNC: 4.2 MMOL/L (ref 3.7–5.3)
PROTEIN UA: NEGATIVE
RBC # BLD: 4.52 M/UL (ref 4–5.2)
RBC # BLD: ABNORMAL 10*6/UL
RBC UA: ABNORMAL /HPF (ref 0–4)
RENAL EPITHELIAL, UA: ABNORMAL /HPF
SEG NEUTROPHILS: 75 % (ref 43–77)
SEGMENTED NEUTROPHILS ABSOLUTE COUNT: 5.9 K/UL (ref 1.8–7.7)
SODIUM BLD-SCNC: 139 MMOL/L (ref 135–144)
SPECIFIC GRAVITY UA: 1.01 (ref 1.01–1.02)
TRICHOMONAS: ABNORMAL
TROPONIN INTERP: NORMAL
TROPONIN INTERP: NORMAL
TROPONIN T: <0.03 NG/ML
TROPONIN T: <0.03 NG/ML
TURBIDITY: ABNORMAL
URINE HGB: NEGATIVE
UROBILINOGEN, URINE: NORMAL
WBC # BLD: 7.9 K/UL (ref 3.5–11)
WBC # BLD: ABNORMAL 10*3/UL
WBC UA: ABNORMAL /HPF (ref 0–4)
YEAST: PRESENT

## 2018-05-08 PROCEDURE — 6360000002 HC RX W HCPCS

## 2018-05-08 PROCEDURE — 6360000002 HC RX W HCPCS: Performed by: PHYSICIAN ASSISTANT

## 2018-05-08 PROCEDURE — G0378 HOSPITAL OBSERVATION PER HR: HCPCS

## 2018-05-08 PROCEDURE — 2580000003 HC RX 258: Performed by: INTERNAL MEDICINE

## 2018-05-08 PROCEDURE — 6370000000 HC RX 637 (ALT 250 FOR IP): Performed by: INTERNAL MEDICINE

## 2018-05-08 PROCEDURE — 6370000000 HC RX 637 (ALT 250 FOR IP): Performed by: PHYSICIAN ASSISTANT

## 2018-05-08 PROCEDURE — 99221 1ST HOSP IP/OBS SF/LOW 40: CPT | Performed by: INTERNAL MEDICINE

## 2018-05-08 PROCEDURE — 51798 US URINE CAPACITY MEASURE: CPT

## 2018-05-08 PROCEDURE — 99232 SBSQ HOSP IP/OBS MODERATE 35: CPT | Performed by: INTERNAL MEDICINE

## 2018-05-08 PROCEDURE — 2060000000 HC ICU INTERMEDIATE R&B

## 2018-05-08 PROCEDURE — 96372 THER/PROPH/DIAG INJ SC/IM: CPT

## 2018-05-08 PROCEDURE — 93306 TTE W/DOPPLER COMPLETE: CPT

## 2018-05-08 PROCEDURE — 96376 TX/PRO/DX INJ SAME DRUG ADON: CPT

## 2018-05-08 PROCEDURE — 81001 URINALYSIS AUTO W/SCOPE: CPT

## 2018-05-08 PROCEDURE — 94640 AIRWAY INHALATION TREATMENT: CPT

## 2018-05-08 PROCEDURE — 51702 INSERT TEMP BLADDER CATH: CPT

## 2018-05-08 PROCEDURE — 85025 COMPLETE CBC W/AUTO DIFF WBC: CPT

## 2018-05-08 PROCEDURE — 80048 BASIC METABOLIC PNL TOTAL CA: CPT

## 2018-05-08 PROCEDURE — 36415 COLL VENOUS BLD VENIPUNCTURE: CPT

## 2018-05-08 RX ORDER — SODIUM CHLORIDE, SODIUM LACTATE, POTASSIUM CHLORIDE, CALCIUM CHLORIDE 600; 310; 30; 20 MG/100ML; MG/100ML; MG/100ML; MG/100ML
INJECTION, SOLUTION INTRAVENOUS CONTINUOUS
Status: DISCONTINUED | OUTPATIENT
Start: 2018-05-08 | End: 2018-05-09 | Stop reason: HOSPADM

## 2018-05-08 RX ORDER — SODIUM CHLORIDE, SODIUM LACTATE, POTASSIUM CHLORIDE, AND CALCIUM CHLORIDE .6; .31; .03; .02 G/100ML; G/100ML; G/100ML; G/100ML
1000 INJECTION, SOLUTION INTRAVENOUS ONCE
Status: COMPLETED | OUTPATIENT
Start: 2018-05-08 | End: 2018-05-08

## 2018-05-08 RX ORDER — BETHANECHOL CHLORIDE 25 MG/1
25 TABLET ORAL 4 TIMES DAILY
Status: DISCONTINUED | OUTPATIENT
Start: 2018-05-08 | End: 2018-05-09 | Stop reason: HOSPADM

## 2018-05-08 RX ORDER — ATROPINE SULFATE 0.1 MG/ML
INJECTION INTRAVENOUS
Status: COMPLETED
Start: 2018-05-08 | End: 2018-05-08

## 2018-05-08 RX ORDER — FLUCONAZOLE 100 MG/1
200 TABLET ORAL ONCE
Status: COMPLETED | OUTPATIENT
Start: 2018-05-08 | End: 2018-05-08

## 2018-05-08 RX ORDER — ATROPINE SULFATE 1 MG/ML
0.5 INJECTION, SOLUTION INTRAMUSCULAR; INTRAVENOUS; SUBCUTANEOUS ONCE
Status: DISCONTINUED | OUTPATIENT
Start: 2018-05-08 | End: 2018-05-09 | Stop reason: HOSPADM

## 2018-05-08 RX ADMIN — ASPIRIN 81 MG 81 MG: 81 TABLET ORAL at 07:53

## 2018-05-08 RX ADMIN — BUSPIRONE HYDROCHLORIDE 10 MG: 5 TABLET ORAL at 07:53

## 2018-05-08 RX ADMIN — MOMETASONE FUROATE AND FORMOTEROL FUMARATE DIHYDRATE 2 PUFF: 200; 5 AEROSOL RESPIRATORY (INHALATION) at 08:22

## 2018-05-08 RX ADMIN — ALBUTEROL SULFATE 2.5 MG: 2.5 SOLUTION RESPIRATORY (INHALATION) at 08:43

## 2018-05-08 RX ADMIN — ATROPINE SULFATE 0.5 MG: 0.1 INJECTION PARENTERAL at 00:31

## 2018-05-08 RX ADMIN — SODIUM CHLORIDE, POTASSIUM CHLORIDE, SODIUM LACTATE AND CALCIUM CHLORIDE: 600; 310; 30; 20 INJECTION, SOLUTION INTRAVENOUS at 09:58

## 2018-05-08 RX ADMIN — CETIRIZINE HYDROCHLORIDE 10 MG: 5 TABLET, FILM COATED ORAL at 07:54

## 2018-05-08 RX ADMIN — SODIUM CHLORIDE, POTASSIUM CHLORIDE, SODIUM LACTATE AND CALCIUM CHLORIDE 1000 ML: 600; 310; 30; 20 INJECTION, SOLUTION INTRAVENOUS at 08:53

## 2018-05-08 RX ADMIN — LISINOPRIL 5 MG: 5 TABLET ORAL at 07:54

## 2018-05-08 RX ADMIN — FLUTICASONE PROPIONATE 1 SPRAY: 50 SPRAY, METERED NASAL at 08:27

## 2018-05-08 RX ADMIN — BUSPIRONE HYDROCHLORIDE 10 MG: 5 TABLET ORAL at 22:11

## 2018-05-08 RX ADMIN — LEVOTHYROXINE SODIUM 50 MCG: 25 TABLET ORAL at 06:41

## 2018-05-08 RX ADMIN — POTASSIUM CHLORIDE 20 MEQ: 20 TABLET, EXTENDED RELEASE ORAL at 07:54

## 2018-05-08 RX ADMIN — ALBUTEROL SULFATE 2.5 MG: 2.5 SOLUTION RESPIRATORY (INHALATION) at 22:35

## 2018-05-08 RX ADMIN — BETHANECHOL CHLORIDE 25 MG: 25 TABLET ORAL at 13:19

## 2018-05-08 RX ADMIN — FLUCONAZOLE 200 MG: 100 TABLET ORAL at 09:59

## 2018-05-08 RX ADMIN — BUSPIRONE HYDROCHLORIDE 10 MG: 5 TABLET ORAL at 13:19

## 2018-05-08 RX ADMIN — ENOXAPARIN SODIUM 40 MG: 40 INJECTION SUBCUTANEOUS at 07:53

## 2018-05-08 RX ADMIN — HYDRALAZINE HYDROCHLORIDE 20 MG: 20 INJECTION INTRAMUSCULAR; INTRAVENOUS at 07:54

## 2018-05-08 RX ADMIN — HYDROCHLOROTHIAZIDE 12.5 MG: 12.5 TABLET ORAL at 07:53

## 2018-05-08 RX ADMIN — DONEPEZIL HYDROCHLORIDE 10 MG: 5 TABLET, FILM COATED ORAL at 07:54

## 2018-05-08 RX ADMIN — BETHANECHOL CHLORIDE 25 MG: 25 TABLET ORAL at 22:11

## 2018-05-08 RX ADMIN — METOPROLOL TARTRATE 25 MG: 25 TABLET ORAL at 07:53

## 2018-05-08 RX ADMIN — MOMETASONE FUROATE AND FORMOTEROL FUMARATE DIHYDRATE 2 PUFF: 200; 5 AEROSOL RESPIRATORY (INHALATION) at 20:03

## 2018-05-08 RX ADMIN — BETHANECHOL CHLORIDE 25 MG: 25 TABLET ORAL at 17:44

## 2018-05-08 RX ADMIN — ISOSORBIDE MONONITRATE 30 MG: 30 TABLET, EXTENDED RELEASE ORAL at 07:54

## 2018-05-08 RX ADMIN — POTASSIUM CHLORIDE 20 MEQ: 20 TABLET, EXTENDED RELEASE ORAL at 22:11

## 2018-05-08 RX ADMIN — METOPROLOL TARTRATE 25 MG: 25 TABLET ORAL at 22:11

## 2018-05-08 ASSESSMENT — PAIN SCALES - GENERAL
PAINLEVEL_OUTOF10: 0

## 2018-05-09 VITALS
OXYGEN SATURATION: 97 % | HEART RATE: 69 BPM | TEMPERATURE: 98.6 F | BODY MASS INDEX: 21.78 KG/M2 | WEIGHT: 127.6 LBS | RESPIRATION RATE: 16 BRPM | HEIGHT: 64 IN | SYSTOLIC BLOOD PRESSURE: 125 MMHG | DIASTOLIC BLOOD PRESSURE: 71 MMHG

## 2018-05-09 LAB
ABSOLUTE EOS #: 0.2 K/UL (ref 0–0.4)
ABSOLUTE IMMATURE GRANULOCYTE: NORMAL K/UL (ref 0–0.3)
ABSOLUTE LYMPH #: 1.5 K/UL (ref 1–4.8)
ABSOLUTE MONO #: 0.7 K/UL (ref 0.1–1.2)
ANION GAP SERPL CALCULATED.3IONS-SCNC: 13 MMOL/L (ref 9–17)
BASOPHILS # BLD: 1 % (ref 0–1)
BASOPHILS ABSOLUTE: 0 K/UL (ref 0–0.2)
BUN BLDV-MCNC: 9 MG/DL (ref 8–23)
BUN/CREAT BLD: 14 (ref 9–20)
CALCIUM SERPL-MCNC: 9 MG/DL (ref 8.6–10.4)
CHLORIDE BLD-SCNC: 101 MMOL/L (ref 98–107)
CO2: 23 MMOL/L (ref 20–31)
CREAT SERPL-MCNC: 0.65 MG/DL (ref 0.5–0.9)
DIFFERENTIAL TYPE: NORMAL
EOSINOPHILS RELATIVE PERCENT: 3 % (ref 1–7)
GFR AFRICAN AMERICAN: >60 ML/MIN
GFR NON-AFRICAN AMERICAN: >60 ML/MIN
GFR SERPL CREATININE-BSD FRML MDRD: ABNORMAL ML/MIN/{1.73_M2}
GFR SERPL CREATININE-BSD FRML MDRD: ABNORMAL ML/MIN/{1.73_M2}
GLUCOSE BLD-MCNC: 109 MG/DL (ref 70–99)
HCT VFR BLD CALC: 40.7 % (ref 36–46)
HEMOGLOBIN: 13.9 G/DL (ref 12–16)
IMMATURE GRANULOCYTES: NORMAL %
LYMPHOCYTES # BLD: 21 % (ref 16–46)
MCH RBC QN AUTO: 30.8 PG (ref 26–34)
MCHC RBC AUTO-ENTMCNC: 34.1 G/DL (ref 31–37)
MCV RBC AUTO: 90.2 FL (ref 80–100)
MONOCYTES # BLD: 10 % (ref 4–11)
NRBC AUTOMATED: NORMAL PER 100 WBC
PDW BLD-RTO: 14.1 % (ref 11–14.5)
PLATELET # BLD: 254 K/UL (ref 140–450)
PLATELET ESTIMATE: NORMAL
PMV BLD AUTO: 7.8 FL (ref 6–12)
POTASSIUM SERPL-SCNC: 4.1 MMOL/L (ref 3.7–5.3)
RBC # BLD: 4.51 M/UL (ref 4–5.2)
RBC # BLD: NORMAL 10*6/UL
SEG NEUTROPHILS: 65 % (ref 43–77)
SEGMENTED NEUTROPHILS ABSOLUTE COUNT: 4.8 K/UL (ref 1.8–7.7)
SODIUM BLD-SCNC: 137 MMOL/L (ref 135–144)
WBC # BLD: 7.3 K/UL (ref 3.5–11)
WBC # BLD: NORMAL 10*3/UL

## 2018-05-09 PROCEDURE — 96372 THER/PROPH/DIAG INJ SC/IM: CPT

## 2018-05-09 PROCEDURE — 93005 ELECTROCARDIOGRAM TRACING: CPT

## 2018-05-09 PROCEDURE — 2580000003 HC RX 258: Performed by: INTERNAL MEDICINE

## 2018-05-09 PROCEDURE — 36415 COLL VENOUS BLD VENIPUNCTURE: CPT

## 2018-05-09 PROCEDURE — 94640 AIRWAY INHALATION TREATMENT: CPT

## 2018-05-09 PROCEDURE — 6370000000 HC RX 637 (ALT 250 FOR IP): Performed by: PHYSICIAN ASSISTANT

## 2018-05-09 PROCEDURE — G0378 HOSPITAL OBSERVATION PER HR: HCPCS

## 2018-05-09 PROCEDURE — 6360000002 HC RX W HCPCS: Performed by: PHYSICIAN ASSISTANT

## 2018-05-09 PROCEDURE — 99238 HOSP IP/OBS DSCHRG MGMT 30/<: CPT | Performed by: INTERNAL MEDICINE

## 2018-05-09 PROCEDURE — 6370000000 HC RX 637 (ALT 250 FOR IP): Performed by: INTERNAL MEDICINE

## 2018-05-09 PROCEDURE — 94761 N-INVAS EAR/PLS OXIMETRY MLT: CPT

## 2018-05-09 PROCEDURE — 80048 BASIC METABOLIC PNL TOTAL CA: CPT

## 2018-05-09 PROCEDURE — 85025 COMPLETE CBC W/AUTO DIFF WBC: CPT

## 2018-05-09 RX ORDER — BETHANECHOL CHLORIDE 25 MG/1
25 TABLET ORAL 4 TIMES DAILY
Qty: 90 TABLET | Refills: 0
Start: 2018-05-09

## 2018-05-09 RX ORDER — LISINOPRIL 5 MG/1
5 TABLET ORAL DAILY
Qty: 30 TABLET | Refills: 0
Start: 2018-05-10

## 2018-05-09 RX ADMIN — LEVOTHYROXINE SODIUM 50 MCG: 25 TABLET ORAL at 06:16

## 2018-05-09 RX ADMIN — METOPROLOL TARTRATE 25 MG: 25 TABLET ORAL at 09:00

## 2018-05-09 RX ADMIN — MOMETASONE FUROATE AND FORMOTEROL FUMARATE DIHYDRATE 2 PUFF: 200; 5 AEROSOL RESPIRATORY (INHALATION) at 07:29

## 2018-05-09 RX ADMIN — BUSPIRONE HYDROCHLORIDE 10 MG: 5 TABLET ORAL at 12:49

## 2018-05-09 RX ADMIN — ASPIRIN 81 MG 81 MG: 81 TABLET ORAL at 08:59

## 2018-05-09 RX ADMIN — FLUTICASONE PROPIONATE 1 SPRAY: 50 SPRAY, METERED NASAL at 08:58

## 2018-05-09 RX ADMIN — DONEPEZIL HYDROCHLORIDE 10 MG: 5 TABLET, FILM COATED ORAL at 09:00

## 2018-05-09 RX ADMIN — HYDROCHLOROTHIAZIDE 12.5 MG: 12.5 TABLET ORAL at 08:59

## 2018-05-09 RX ADMIN — POTASSIUM CHLORIDE 20 MEQ: 20 TABLET, EXTENDED RELEASE ORAL at 09:00

## 2018-05-09 RX ADMIN — BETHANECHOL CHLORIDE 25 MG: 25 TABLET ORAL at 08:59

## 2018-05-09 RX ADMIN — LISINOPRIL 5 MG: 5 TABLET ORAL at 08:59

## 2018-05-09 RX ADMIN — BUSPIRONE HYDROCHLORIDE 10 MG: 5 TABLET ORAL at 09:00

## 2018-05-09 RX ADMIN — BETHANECHOL CHLORIDE 25 MG: 25 TABLET ORAL at 12:49

## 2018-05-09 RX ADMIN — ALBUTEROL SULFATE 2.5 MG: 2.5 SOLUTION RESPIRATORY (INHALATION) at 11:56

## 2018-05-09 RX ADMIN — ISOSORBIDE MONONITRATE 30 MG: 30 TABLET, EXTENDED RELEASE ORAL at 09:00

## 2018-05-09 RX ADMIN — CETIRIZINE HYDROCHLORIDE 10 MG: 5 TABLET, FILM COATED ORAL at 08:59

## 2018-05-09 RX ADMIN — SODIUM CHLORIDE, POTASSIUM CHLORIDE, SODIUM LACTATE AND CALCIUM CHLORIDE: 600; 310; 30; 20 INJECTION, SOLUTION INTRAVENOUS at 04:36

## 2018-05-09 RX ADMIN — ENOXAPARIN SODIUM 40 MG: 40 INJECTION SUBCUTANEOUS at 08:58

## 2018-05-09 ASSESSMENT — PAIN SCALES - GENERAL
PAINLEVEL_OUTOF10: 0

## 2018-05-10 ENCOUNTER — TELEPHONE (OUTPATIENT)
Dept: FAMILY MEDICINE CLINIC | Age: 83
End: 2018-05-10

## 2018-05-11 LAB
CREATININE: 0.8 MG/DL
EKG ATRIAL RATE: 70 BPM
EKG P AXIS: 43 DEGREES
EKG P-R INTERVAL: 188 MS
EKG Q-T INTERVAL: 460 MS
EKG QRS DURATION: 118 MS
EKG QTC CALCULATION (BAZETT): 496 MS
EKG R AXIS: -57 DEGREES
EKG T AXIS: 90 DEGREES
EKG VENTRICULAR RATE: 70 BPM

## 2018-05-16 ENCOUNTER — OUTSIDE SERVICES (OUTPATIENT)
Dept: FAMILY MEDICINE CLINIC | Age: 83
End: 2018-05-16
Payer: MEDICARE

## 2018-05-16 ENCOUNTER — TELEPHONE (OUTPATIENT)
Dept: PHARMACY | Facility: CLINIC | Age: 83
End: 2018-05-16

## 2018-05-16 DIAGNOSIS — F03.90 DEMENTIA WITHOUT BEHAVIORAL DISTURBANCE, UNSPECIFIED DEMENTIA TYPE: ICD-10-CM

## 2018-05-16 DIAGNOSIS — F41.9 ANXIETY: ICD-10-CM

## 2018-05-16 DIAGNOSIS — I16.0 HYPERTENSIVE URGENCY: Primary | ICD-10-CM

## 2018-05-16 DIAGNOSIS — F22 PARANOIA (HCC): ICD-10-CM

## 2018-05-16 DIAGNOSIS — I10 ESSENTIAL HYPERTENSION: Primary | ICD-10-CM

## 2018-05-16 DIAGNOSIS — R06.09 DYSPNEA ON EXERTION: ICD-10-CM

## 2018-05-16 PROCEDURE — 1111F DSCHRG MED/CURRENT MED MERGE: CPT | Performed by: PHARMACIST

## 2018-05-17 PROCEDURE — 99309 SBSQ NF CARE MODERATE MDM 30: CPT | Performed by: FAMILY MEDICINE

## 2018-05-17 RX ORDER — BUSPIRONE HYDROCHLORIDE 10 MG/1
20 TABLET ORAL 3 TIMES DAILY
Qty: 60 TABLET | Refills: 11 | OUTPATIENT
Start: 2018-05-17 | End: 2022-05-11

## 2018-05-17 RX ORDER — LORAZEPAM 0.5 MG/1
0.5 TABLET ORAL DAILY PRN
Qty: 30 TABLET | Refills: 0 | OUTPATIENT
Start: 2018-05-17 | End: 2018-06-16

## 2018-05-17 ASSESSMENT — ENCOUNTER SYMPTOMS
CHEST TIGHTNESS: 0
COUGH: 0
NAUSEA: 0
SHORTNESS OF BREATH: 0
ABDOMINAL PAIN: 0
CONSTIPATION: 0
WHEEZING: 0
DIARRHEA: 0

## 2018-06-08 ENCOUNTER — CARE COORDINATION (OUTPATIENT)
Dept: CASE MANAGEMENT | Age: 83
End: 2018-06-08

## 2018-06-27 ENCOUNTER — OUTSIDE SERVICES (OUTPATIENT)
Dept: FAMILY MEDICINE CLINIC | Age: 83
End: 2018-06-27
Payer: MEDICARE

## 2018-06-27 DIAGNOSIS — I10 ESSENTIAL HYPERTENSION: ICD-10-CM

## 2018-06-27 DIAGNOSIS — F41.9 ANXIETY: ICD-10-CM

## 2018-06-27 DIAGNOSIS — F03.90 DEMENTIA WITHOUT BEHAVIORAL DISTURBANCE, UNSPECIFIED DEMENTIA TYPE: ICD-10-CM

## 2018-06-27 DIAGNOSIS — J41.1 MUCOPURULENT CHRONIC BRONCHITIS (HCC): Primary | ICD-10-CM

## 2018-06-28 PROCEDURE — 99309 SBSQ NF CARE MODERATE MDM 30: CPT | Performed by: FAMILY MEDICINE

## 2018-06-28 ASSESSMENT — ENCOUNTER SYMPTOMS
COLOR CHANGE: 0
ABDOMINAL PAIN: 0
SINUS PRESSURE: 1
DIARRHEA: 0
CONSTIPATION: 0
NAUSEA: 0
COUGH: 1
WHEEZING: 0
SHORTNESS OF BREATH: 1
CHEST TIGHTNESS: 0

## 2018-08-01 ENCOUNTER — OUTSIDE SERVICES (OUTPATIENT)
Dept: FAMILY MEDICINE CLINIC | Age: 83
End: 2018-08-01
Payer: MEDICARE

## 2018-08-01 DIAGNOSIS — I10 ESSENTIAL HYPERTENSION: ICD-10-CM

## 2018-08-01 DIAGNOSIS — F03.90 DEMENTIA WITHOUT BEHAVIORAL DISTURBANCE, UNSPECIFIED DEMENTIA TYPE: ICD-10-CM

## 2018-08-01 DIAGNOSIS — J41.1 MUCOPURULENT CHRONIC BRONCHITIS (HCC): Primary | ICD-10-CM

## 2018-08-02 PROCEDURE — 99309 SBSQ NF CARE MODERATE MDM 30: CPT | Performed by: FAMILY MEDICINE

## 2018-08-02 ASSESSMENT — ENCOUNTER SYMPTOMS
SHORTNESS OF BREATH: 1
NAUSEA: 0
CONSTIPATION: 0
COLOR CHANGE: 0
SORE THROAT: 0
CHEST TIGHTNESS: 1
ABDOMINAL PAIN: 0
WHEEZING: 0
DIARRHEA: 0
COUGH: 1

## 2018-08-02 NOTE — PROGRESS NOTES
cervical adenopathy. Neurological: She is alert and oriented to person, place, and time. Skin: Skin is warm and dry. No rash noted. No erythema. Psychiatric: Her speech is normal and behavior is normal. Thought content normal. Her mood appears anxious. She expresses impulsivity. She exhibits abnormal recent memory. Nursing note and vitals reviewed. Assessment:       Diagnosis Orders   1. Mucopurulent chronic bronchitis ([address]     2. Essential hypertension     3. Dementia without behavioral disturbance, unspecified dementia type               Plan:       COPD: stable; she is chronically short of breath but it has not worsened significantly. I will continue her current medications and continue to reassure her that we have \"safe air\" for her lungs. HTN: stable; her blood pressure has recnelty well controlled so I will continue to monitor. Dementia: stable; she is not having any behavioral issues and she is not acting out. Return in about 1 month (around 9/1/2018) for COPD follow up. Patient given educational materials - see patient instructions. Discussed use, benefit, and side effects of prescribed medications. All patient questions answered. Pt voiced understanding. Reviewed health maintenance. Instructed to continue current medications, diet and exercise. Patient agreed with treatment plan. Follow up as directed.      Electronically signed by Danielle Berrios MD on 8/2/2018 at 1:40 PM

## 2018-09-05 ENCOUNTER — OUTSIDE SERVICES (OUTPATIENT)
Dept: FAMILY MEDICINE CLINIC | Age: 83
End: 2018-09-05
Payer: MEDICARE

## 2018-09-05 DIAGNOSIS — J41.1 MUCOPURULENT CHRONIC BRONCHITIS (HCC): Primary | ICD-10-CM

## 2018-09-05 DIAGNOSIS — F03.90 DEMENTIA WITHOUT BEHAVIORAL DISTURBANCE, UNSPECIFIED DEMENTIA TYPE: ICD-10-CM

## 2018-09-05 DIAGNOSIS — I10 ESSENTIAL HYPERTENSION: ICD-10-CM

## 2018-09-05 DIAGNOSIS — F41.9 ANXIETY: ICD-10-CM

## 2018-09-06 PROCEDURE — 99309 SBSQ NF CARE MODERATE MDM 30: CPT | Performed by: FAMILY MEDICINE

## 2018-09-06 ASSESSMENT — ENCOUNTER SYMPTOMS
CONSTIPATION: 0
ABDOMINAL PAIN: 0
CHEST TIGHTNESS: 1
NAUSEA: 0
COLOR CHANGE: 0
WHEEZING: 1
SHORTNESS OF BREATH: 1
DIARRHEA: 0
COUGH: 1

## 2018-10-17 ENCOUNTER — OUTSIDE SERVICES (OUTPATIENT)
Dept: FAMILY MEDICINE CLINIC | Age: 83
End: 2018-10-17
Payer: MEDICARE

## 2018-10-17 DIAGNOSIS — J41.1 MUCOPURULENT CHRONIC BRONCHITIS (HCC): Primary | ICD-10-CM

## 2018-10-17 DIAGNOSIS — I10 ESSENTIAL HYPERTENSION: ICD-10-CM

## 2018-10-17 DIAGNOSIS — F41.9 ANXIETY: ICD-10-CM

## 2018-10-18 PROCEDURE — 99309 SBSQ NF CARE MODERATE MDM 30: CPT | Performed by: FAMILY MEDICINE

## 2018-10-18 ASSESSMENT — ENCOUNTER SYMPTOMS
SHORTNESS OF BREATH: 1
CHEST TIGHTNESS: 1
CONSTIPATION: 0
DIARRHEA: 0
NAUSEA: 0
ABDOMINAL PAIN: 0
COUGH: 0
WHEEZING: 0

## 2018-10-18 NOTE — PROGRESS NOTES
[address] [facility]  Dept: [phone]  Dept Fax: [phone]  Loc: [phone]    Masoud Norman a 80 y.o. female who presents today for her medical conditions/complaints as notedbelow. Austin Cardenas is c/o of   Chief Complaint   Patient presents with    Hypertension    COPD    Anxiety       HPI:     HPI Yury Martino was seen for her regular monthly follow up while at [facility]. She is feeling worse today. She is complaining that her lungs are more tight and that she can't breathe. (her pulse ox has been normal). She is not complaining of a cough and she is able to talk without difficulty. She is not complaining of any headache, sinus pain, ear pain or sore throat. She is not having any chest pain. She is not having any abdominal pain or bowel issues and she is eating well. Past Medical History:   Diagnosis Date    Arthritis     Chronic kidney disease     h/o mild renal insufficency    COPD (chronic obstructive pulmonary disease) (HCC)     Dyspnea     chronic    Hypertension     Hyponatremia     Left bundle branch block     chronic, old    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  TC, RRT 4/20/18    Alcohol use No     Current Outpatient Prescriptions   Medication Sig Dispense Refill    busPIRone (BUSPAR) 10 MG tablet Take 2 tablets by mouth 3 times daily 60 tablet 11    lisinopril (PRINIVIL;ZESTRIL) 5 MG tablet Take 1 tablet by mouth daily 30 tablet 0    metoprolol tartrate (LOPRESSOR) 25 MG tablet Take 1 tablet by mouth 2 times daily 60 tablet 0    bethanechol (URECHOLINE) 25 MG tablet Take 1 tablet by mouth 4 times daily 90 tablet 0    albuterol (PROVENTIL) (2.5 MG/3ML) 0.083% nebulizer solution Take 2.5 mg by nebulization every 6 hours as needed for Wheezing      Multiple Vitamin (MULTI VITAMIN DAILY) TABS Take 1 tablet by mouth daily Take 1 tab po daily 30 tablet 11    fluticasone (FLONASE) 50 MCG/ACT nasal spray 1 spray by Nasal route daily 1 Bottle 11    hydrochlorothiazide (MICROZIDE) 12.5 MG capsule take 1 capsule by mouth once daily 30 capsule 11    Calcium Carbonate (CALCIUM 600 PO) Take 600 mg by mouth 2 times daily      loratadine (CLARITIN) 10 MG capsule Take 1 capsule by mouth daily 30 capsule 11    isosorbide mononitrate (IMDUR) 30 MG extended release tablet Take 1 tablet by mouth daily 30 tablet 11    potassium chloride (KLOR-CON M) 20 MEQ extended release tablet Take 1 tablet by mouth 2 times daily 60 tablet 11    aspirin 81 MG chewable tablet Take 1 tablet by mouth daily 30 tablet 11    acetaminophen (TYLENOL) 325 MG tablet 1-2 tablets every 4 hours prn pain/ fever. 120 tablet 11    budesonide-formoterol (SYMBICORT) 80-4.5 MCG/ACT AERO Inhale 2 puffs into the lungs 2 times daily 1 Inhaler 11    Cholecalciferol (VITAMIN D) 2000 UNITS CAPS capsule Take 4,000 Units by mouth daily      nitroGLYCERIN (NITROSTAT) 0.4 MG SL tablet Place 1 tablet under the tongue every 5 minutes as needed for Chest pain 25 tablet 3    aluminum & magnesium hydroxide-simethicone (MAALOX REGULAR STRENGTH) 200-200-20 MG/5ML SUSP suspension Take 30 mLs by mouth every 6 hours as needed for Indigestion 1 Bottle 5    mirtazapine (REMERON) 15 MG tablet Take 0.5 tablets by mouth nightly 45 tablet 3    levothyroxine (SYNTHROID) 50 MCG tablet Take 1 tablet by mouth Daily 30 tablet 11    donepezil (ARICEPT) 10 MG tablet Take 10 mg by mouth daily      simethicone (MYLICON) 856 MG chewable tablet Take 125 mg by mouth every 6 hours as needed for Flatulence      OXYGEN Inhale 2 L/min into the lungs nightly. No current facility-administered medications for this visit. Allergies   Allergen Reactions    Amlodipine Other (See Comments)     Pt says she got all the side effects      Sulfa Antibiotics     Cleocin [Clindamycin] Rash    Macrolides And Ketolides Rash     \"All mycins\"    Pcn [Penicillins] Rash    Tetracyclines & Related Rash    Zithromax [Azithromycin] Rash       Subjective:     Review of Systems   Constitutional: Negative for activity change, appetite change, chills, fatigue and fever. Eyes: Negative for visual disturbance. Respiratory: Positive for chest tightness and shortness of breath. Negative for cough and wheezing. Cardiovascular: Negative for chest pain, palpitations and leg swelling. Gastrointestinal: Negative for abdominal pain, constipation, diarrhea and nausea. Genitourinary: Negative for difficulty urinating. Neurological: Negative for dizziness, syncope, weakness, light-headedness and headaches. Objective:     Physical Exam   Constitutional: She is oriented to person, place, and time. She appears well-developed and well-nourished. No distress. Eyes: Pupils are equal, round, and reactive to light. Conjunctivae and EOM are normal.   Neck: Normal range of motion. Neck supple. No thyromegaly present. Cardiovascular: Normal rate, regular rhythm, normal heart sounds and intact distal pulses. No murmur heard. Pulmonary/Chest: Effort normal and breath sounds normal. No respiratory distress. She has no wheezes. Musculoskeletal: She exhibits no edema. Lymphadenopathy:     She has no cervical adenopathy. Neurological: She is alert and oriented to person, place, and time. Skin: Skin is warm and dry. No rash noted. No erythema. Psychiatric: She has a normal mood and affect. Her behavior is normal. Thought content normal.   Nursing note and vitals reviewed. Assessment:       Diagnosis Orders   1. Mucopurulent chronic bronchitis ([address]     2. Essential hypertension     3.  Anxiety               Plan:       COPD: stable; her lungs sound fine and she tends to worry so I reassured her that she did not need any new medication at this time. I also started scheduling her albuterol so that she does not have to ask for it every time. HTN: stable; her blood pressure has been good lately so I will continue to monitor. Anxiety: worsening; she is very worked up today but overall has been doing okay so I just tried to reassure her today. Return in about 1 month (around 11/17/2018) for Anxiety follow up. Patientgiven educational materials - see patient instructions. Discussed use, benefit,and side effects of prescribed medications. All patient questions answered. Ptvoiced understanding. Reviewed health maintenance. Instructed to continue currentmedications, diet and exercise. Patient agreed with treatment plan. Follow up asdirected.      Electronically signed by Calos Townsend MD on 10/18/2018 at 11:41 AM

## 2018-11-21 ENCOUNTER — OUTSIDE SERVICES (OUTPATIENT)
Dept: FAMILY MEDICINE CLINIC | Age: 83
End: 2018-11-21
Payer: MEDICARE

## 2018-11-21 DIAGNOSIS — F03.90 DEMENTIA WITHOUT BEHAVIORAL DISTURBANCE, UNSPECIFIED DEMENTIA TYPE: ICD-10-CM

## 2018-11-21 DIAGNOSIS — J41.1 MUCOPURULENT CHRONIC BRONCHITIS (HCC): Primary | ICD-10-CM

## 2018-11-21 DIAGNOSIS — F41.9 ANXIETY: ICD-10-CM

## 2018-11-21 DIAGNOSIS — I10 ESSENTIAL HYPERTENSION: ICD-10-CM

## 2018-11-21 RX ORDER — PAROXETINE 10 MG/1
10 TABLET, FILM COATED ORAL NIGHTLY
Status: ON HOLD | COMMUNITY
End: 2022-09-20 | Stop reason: SDUPTHER

## 2018-11-25 PROCEDURE — 99309 SBSQ NF CARE MODERATE MDM 30: CPT | Performed by: FAMILY MEDICINE

## 2018-11-25 ASSESSMENT — ENCOUNTER SYMPTOMS
WHEEZING: 0
DIARRHEA: 0
COUGH: 1
NAUSEA: 0
VOICE CHANGE: 1
CHEST TIGHTNESS: 1
ABDOMINAL PAIN: 0
CONSTIPATION: 0
SHORTNESS OF BREATH: 1

## 2018-12-23 ENCOUNTER — OUTSIDE SERVICES (OUTPATIENT)
Dept: PRIMARY CARE CLINIC | Age: 83
End: 2018-12-23
Payer: MEDICARE

## 2018-12-23 DIAGNOSIS — J41.1 MUCOPURULENT CHRONIC BRONCHITIS (HCC): Primary | ICD-10-CM

## 2018-12-23 DIAGNOSIS — F41.9 ANXIETY: ICD-10-CM

## 2018-12-23 DIAGNOSIS — I10 ESSENTIAL HYPERTENSION: ICD-10-CM

## 2018-12-23 DIAGNOSIS — F03.90 DEMENTIA WITHOUT BEHAVIORAL DISTURBANCE, UNSPECIFIED DEMENTIA TYPE: ICD-10-CM

## 2018-12-23 PROCEDURE — 99309 SBSQ NF CARE MODERATE MDM 30: CPT | Performed by: FAMILY MEDICINE

## 2018-12-23 RX ORDER — HYDROXYZINE PAMOATE 25 MG/1
25 CAPSULE ORAL 3 TIMES DAILY PRN
COMMUNITY
End: 2019-09-28

## 2018-12-23 ASSESSMENT — ENCOUNTER SYMPTOMS
CONSTIPATION: 0
DIARRHEA: 0
SHORTNESS OF BREATH: 1
ABDOMINAL PAIN: 0
WHEEZING: 0
COLOR CHANGE: 0
CHEST TIGHTNESS: 1
NAUSEA: 0
COUGH: 1

## 2019-01-16 ENCOUNTER — OUTSIDE SERVICES (OUTPATIENT)
Dept: FAMILY MEDICINE CLINIC | Age: 84
End: 2019-01-16
Payer: MEDICARE

## 2019-01-16 DIAGNOSIS — F41.9 ANXIETY: ICD-10-CM

## 2019-01-16 DIAGNOSIS — I10 ESSENTIAL HYPERTENSION: ICD-10-CM

## 2019-01-16 DIAGNOSIS — J41.1 MUCOPURULENT CHRONIC BRONCHITIS (HCC): Primary | ICD-10-CM

## 2019-01-16 PROCEDURE — 99309 SBSQ NF CARE MODERATE MDM 30: CPT | Performed by: FAMILY MEDICINE

## 2019-01-16 RX ORDER — LORAZEPAM 0.5 MG/1
0.5 TABLET ORAL DAILY PRN
COMMUNITY
End: 2019-11-30 | Stop reason: ALTCHOICE

## 2019-01-16 ASSESSMENT — ENCOUNTER SYMPTOMS
COUGH: 0
ABDOMINAL PAIN: 0
CHEST TIGHTNESS: 1
WHEEZING: 1
NAUSEA: 0
DIARRHEA: 0
SHORTNESS OF BREATH: 1
CONSTIPATION: 0

## 2019-02-13 ENCOUNTER — OUTSIDE SERVICES (OUTPATIENT)
Dept: INTERNAL MEDICINE | Age: 84
End: 2019-02-13
Payer: MEDICARE

## 2019-02-13 DIAGNOSIS — I10 ESSENTIAL HYPERTENSION: ICD-10-CM

## 2019-02-13 DIAGNOSIS — F41.9 ANXIETY: ICD-10-CM

## 2019-02-13 DIAGNOSIS — N18.2 STAGE 2 CHRONIC KIDNEY DISEASE: ICD-10-CM

## 2019-02-13 DIAGNOSIS — F03.90 DEMENTIA WITHOUT BEHAVIORAL DISTURBANCE, UNSPECIFIED DEMENTIA TYPE: Primary | ICD-10-CM

## 2019-02-13 PROCEDURE — 99308 SBSQ NF CARE LOW MDM 20: CPT | Performed by: NURSE PRACTITIONER

## 2019-02-15 ASSESSMENT — ENCOUNTER SYMPTOMS
COUGH: 0
FACIAL SWELLING: 0
EYE PAIN: 0
DIARRHEA: 0
VOMITING: 0
COLOR CHANGE: 0
SORE THROAT: 0
CONSTIPATION: 0
RHINORRHEA: 0
WHEEZING: 0
TROUBLE SWALLOWING: 0
NAUSEA: 0
CHEST TIGHTNESS: 0
BLOOD IN STOOL: 0
SINUS PRESSURE: 0
SHORTNESS OF BREATH: 1
ABDOMINAL PAIN: 0

## 2019-02-27 ENCOUNTER — OUTSIDE SERVICES (OUTPATIENT)
Dept: INTERNAL MEDICINE | Age: 84
End: 2019-02-27
Payer: MEDICARE

## 2019-02-27 DIAGNOSIS — Z20.828 EXPOSURE TO INFLUENZA: ICD-10-CM

## 2019-02-27 DIAGNOSIS — F03.90 DEMENTIA WITHOUT BEHAVIORAL DISTURBANCE, UNSPECIFIED DEMENTIA TYPE: Primary | ICD-10-CM

## 2019-02-27 DIAGNOSIS — I10 ESSENTIAL HYPERTENSION: ICD-10-CM

## 2019-02-27 PROCEDURE — 99308 SBSQ NF CARE LOW MDM 20: CPT | Performed by: NURSE PRACTITIONER

## 2019-03-30 ENCOUNTER — OUTSIDE SERVICES (OUTPATIENT)
Dept: PRIMARY CARE CLINIC | Age: 84
End: 2019-03-30
Payer: MEDICARE

## 2019-03-30 DIAGNOSIS — F41.9 ANXIETY: ICD-10-CM

## 2019-03-30 DIAGNOSIS — I10 ESSENTIAL HYPERTENSION: ICD-10-CM

## 2019-03-30 DIAGNOSIS — J41.1 MUCOPURULENT CHRONIC BRONCHITIS (HCC): Primary | ICD-10-CM

## 2019-03-30 PROCEDURE — 99309 SBSQ NF CARE MODERATE MDM 30: CPT | Performed by: FAMILY MEDICINE

## 2019-03-30 ASSESSMENT — ENCOUNTER SYMPTOMS
DIARRHEA: 0
CONSTIPATION: 0
SINUS PRESSURE: 1
WHEEZING: 0
SHORTNESS OF BREATH: 1
ABDOMINAL PAIN: 0
CHEST TIGHTNESS: 1
NAUSEA: 0
COUGH: 0

## 2019-04-24 ENCOUNTER — OUTSIDE SERVICES (OUTPATIENT)
Dept: FAMILY MEDICINE CLINIC | Age: 84
End: 2019-04-24
Payer: MEDICARE

## 2019-04-24 DIAGNOSIS — F22 PARANOIA (HCC): ICD-10-CM

## 2019-04-24 DIAGNOSIS — J41.1 MUCOPURULENT CHRONIC BRONCHITIS (HCC): Primary | ICD-10-CM

## 2019-04-24 DIAGNOSIS — I10 ESSENTIAL HYPERTENSION: ICD-10-CM

## 2019-04-24 RX ORDER — BUDESONIDE AND FORMOTEROL FUMARATE DIHYDRATE 80; 4.5 UG/1; UG/1
2 AEROSOL RESPIRATORY (INHALATION) 2 TIMES DAILY
COMMUNITY
End: 2020-02-17

## 2019-04-24 NOTE — PROGRESS NOTES
[address] [place] [facility]  Dept: [phone]  Dept Fax: [phone]  Loc: [phone]    Rosy Rapp is a 80 y.o. female who presents today for her medical conditions/complaints as noted below. Rosy Rapp is c/o of   Chief Complaint   Patient presents with    Anxiety    Cough    Hypertension    Dementia       HPI:     HPI Amadeo Guerra was seen for her regular monthly follow up while at [facility]. She has been doing pretty well. She is a little more short of breath than normal and she is feeling tight and congested since the weather has been changing a lot. She feels like she has more mucous than normal. She is also having some headaches and sinus pressure as well. She is not having any pain in her ears. She is otherwise doing well. She is not having any abdominal pain, her appetite is normal and she is not having any problems with her bowels. She is not having any swelling in her legs and she feels that her mood has been stable. She is not having any chest pain. 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  TC, RRT 4/20/18   Substance Use Topics    Alcohol use: No     Alcohol/week: 0.0 oz     Current Outpatient Medications   Medication Sig Dispense Refill    budesonide-formoterol (SYMBICORT) 80-4.5 MCG/ACT AERO Inhale 2 puffs into the lungs 2 times daily      LORazepam (ATIVAN) 0.5 MG tablet Take 0.5 mg by mouth daily as needed (severe anxiety). Pepper Frankel PARoxetine (PAXIL) 10 MG tablet Take 10 mg by mouth nightly      busPIRone (BUSPAR) 10 MG tablet Take 2 tablets by mouth 3 times daily 60 tablet 11    lisinopril (PRINIVIL;ZESTRIL) 5 MG tablet Take 1 tablet by mouth daily 30 tablet 0    metoprolol tartrate (LOPRESSOR) 25 MG tablet Take 1 tablet by mouth 2 times daily 60 tablet 0    bethanechol (URECHOLINE) 25 MG tablet Take 1 tablet by mouth 4 times daily 90 tablet 0    albuterol (PROVENTIL) (2.5 MG/3ML) 0.083% nebulizer solution Take 2.5 mg by nebulization every 6 hours as needed for Wheezing      Multiple Vitamin (MULTI VITAMIN DAILY) TABS Take 1 tablet by mouth daily Take 1 tab po daily 30 tablet 11    fluticasone (FLONASE) 50 MCG/ACT nasal spray 1 spray by Nasal route daily 1 Bottle 11    hydrochlorothiazide (MICROZIDE) 12.5 MG capsule take 1 capsule by mouth once daily 30 capsule 11    Calcium Carbonate (CALCIUM 600 PO) Take 600 mg by mouth 2 times daily      loratadine (CLARITIN) 10 MG capsule Take 1 capsule by mouth daily 30 capsule 11    isosorbide mononitrate (IMDUR) 30 MG extended release tablet Take 1 tablet by mouth daily 30 tablet 11    potassium chloride (KLOR-CON M) 20 MEQ extended release tablet Take 1 tablet by mouth 2 times daily 60 tablet 11    aspirin 81 MG chewable tablet Take 1 tablet by mouth daily 30 tablet 11    acetaminophen (TYLENOL) 325 MG tablet 1-2 tablets every 4 hours prn pain/ fever.  120 tablet 11    Cholecalciferol (VITAMIN D) 2000 UNITS CAPS capsule Take 4,000 Units by mouth daily      nitroGLYCERIN (NITROSTAT) 0.4 MG SL tablet Place 1 tablet under the tongue every 5 minutes as needed for Chest pain 25 tablet 3    aluminum & magnesium hydroxide-simethicone (MAALOX REGULAR STRENGTH) 200-200-20 MG/5ML SUSP suspension Take 30 mLs by mouth every 6 hours as needed for Indigestion 1 Bottle 5    mirtazapine (REMERON) 15 MG tablet Take 0.5 tablets by mouth nightly 45 tablet 3  levothyroxine (SYNTHROID) 50 MCG tablet Take 1 tablet by mouth Daily 30 tablet 11    donepezil (ARICEPT) 10 MG tablet Take 10 mg by mouth daily      simethicone (MYLICON) 335 MG chewable tablet Take 125 mg by mouth every 6 hours as needed for Flatulence      OXYGEN Inhale 2 L/min into the lungs nightly.  hydrOXYzine (VISTARIL) 25 MG capsule Take 25 mg by mouth 3 times daily as needed for Anxiety To use while buspar is on back order      budesonide-formoterol (SYMBICORT) 80-4.5 MCG/ACT AERO Inhale 2 puffs into the lungs 2 times daily 1 Inhaler 11     No current facility-administered medications for this visit. Allergies   Allergen Reactions    Amlodipine Other (See Comments)     Pt says she got all the side effects      Sulfa Antibiotics     Cleocin [Clindamycin] Rash    Macrolides And Ketolides Rash     \"All mycins\"    Pcn [Penicillins] Rash    Tetracyclines & Related Rash    Zithromax [Azithromycin] Rash       Subjective:     Review of Systems   Constitutional: Positive for fatigue. Negative for activity change, appetite change, chills and fever. HENT: Positive for congestion, postnasal drip and sinus pressure. Negative for ear pain, sneezing, sore throat and trouble swallowing. Eyes: Negative for visual disturbance. Respiratory: Positive for cough and shortness of breath. Negative for choking, chest tightness and wheezing. Cardiovascular: Negative for chest pain, palpitations and leg swelling. Gastrointestinal: Negative for constipation, diarrhea and nausea. Skin: Negative for rash. Allergic/Immunologic: Negative for environmental allergies. Objective:      Physical Exam   Constitutional: She is oriented to person, place, and time. She appears well-developed and well-nourished. No distress. Eyes: Conjunctivae are normal.   Neck: Normal range of motion. Neck supple. No thyromegaly present.    Cardiovascular: Normal rate, regular rhythm, normal heart sounds and intact distal pulses. No murmur heard. Pulmonary/Chest: Effort normal. No respiratory distress. She has decreased breath sounds in the right lower field and the left lower field. She has no wheezes. Musculoskeletal: She exhibits no edema. Lymphadenopathy:     She has no cervical adenopathy. Neurological: She is alert and oriented to person, place, and time. Skin: Skin is warm and dry. No rash noted. No erythema. Nursing note and vitals reviewed. Assessment:       Diagnosis Orders   1. Mucopurulent chronic bronchitis ([address]     2. Paranoia ([address]     3. Essential hypertension               Plan:        COPD: stable; she is doing well with just a mild increased cough. I recommended she try the mucinex and tessalon that she has on hand to help with her symptoms. Paranoia: stable; she has been doing well. No recent agitation. She chronically worries about her lungs but they don't bother her unless I ask about them. HTN: stable; her blood pressure has been well controlled on her current medications and she has not had any episodes of hypertensive urgency in a long time. Return in about 1 month (around 5/24/2019) for COPD follow up. Patientgiven educational materials - see patient instructions. Discussed use, benefit,and side effects of prescribed medications. All patient questions answered. Ptvoiced understanding. Reviewed health maintenance. Instructed to continue currentmedications, diet and exercise. Patient agreed with treatment plan. Follow up asdirected.      Electronically signed by Nicole Andino MD on 4/24/2019 at 9:02 AM

## 2019-04-26 PROCEDURE — 99309 SBSQ NF CARE MODERATE MDM 30: CPT | Performed by: FAMILY MEDICINE

## 2019-04-26 ASSESSMENT — ENCOUNTER SYMPTOMS
CONSTIPATION: 0
WHEEZING: 0
SORE THROAT: 0
SINUS PRESSURE: 1
DIARRHEA: 0
SHORTNESS OF BREATH: 1
COUGH: 1
TROUBLE SWALLOWING: 0
NAUSEA: 0
CHEST TIGHTNESS: 0
CHOKING: 0

## 2019-05-14 LAB
CREATININE: 0.7 MG/DL
POTASSIUM (K+): 4.4
TSH SERPL DL<=0.05 MIU/L-ACNC: 1.11 UIU/ML

## 2019-05-29 ENCOUNTER — OUTSIDE SERVICES (OUTPATIENT)
Dept: FAMILY MEDICINE CLINIC | Age: 84
End: 2019-05-29
Payer: MEDICARE

## 2019-05-29 DIAGNOSIS — F02.80 LATE ONSET ALZHEIMER'S DISEASE WITHOUT BEHAVIORAL DISTURBANCE (HCC): ICD-10-CM

## 2019-05-29 DIAGNOSIS — I10 ESSENTIAL HYPERTENSION: ICD-10-CM

## 2019-05-29 DIAGNOSIS — F41.9 ANXIETY: ICD-10-CM

## 2019-05-29 DIAGNOSIS — G30.1 LATE ONSET ALZHEIMER'S DISEASE WITHOUT BEHAVIORAL DISTURBANCE (HCC): ICD-10-CM

## 2019-05-29 DIAGNOSIS — J41.1 MUCOPURULENT CHRONIC BRONCHITIS (HCC): Primary | ICD-10-CM

## 2019-05-29 DIAGNOSIS — B02.9 HERPES ZOSTER WITHOUT COMPLICATION: ICD-10-CM

## 2019-05-30 PROCEDURE — 99309 SBSQ NF CARE MODERATE MDM 30: CPT | Performed by: FAMILY MEDICINE

## 2019-05-30 ASSESSMENT — ENCOUNTER SYMPTOMS
CONSTIPATION: 0
DIARRHEA: 0
WHEEZING: 0
CHEST TIGHTNESS: 1
SHORTNESS OF BREATH: 1
NAUSEA: 0
COUGH: 0
ABDOMINAL PAIN: 0

## 2019-05-30 NOTE — PROGRESS NOTES
[address] [facility]  Dept: [phone]  Dept Fax: [phone]  Loc: [phone]    Melissa Roca is a 80 y.o. female who presents today for her medical conditions/complaints as noted below. Melissa Roca is c/o of   Chief Complaint   Patient presents with    Herpes Zoster    Hypertension    Anxiety    COPD       HPI:     ALDA Sahu was seen for her regular monthly follow up while at [facility]. She is not feeling very well at this visit. She is extremely anxious and quite agitated. She had shingles two weeks ago so she was moved to an isolation room and yesterday she was moved back to her original room. Changing rooms has made her very anxious and she is sure that she cannot breathe the air in her room because it is too warm. She feels very tight in her chest. She does not have an increased cough. She denies feeling anxious (although she is visibly anxious). She denies abdominal pain, constipation or diarrhea. She typically has a good appetite although when I was seeing her she was distressed about where she was going to eat. Her shingles is improving. She denies any significant pain and the rash has scabbed over. 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  TC, RRT 4/20/18   Substance Use Topics    Alcohol use: No     Alcohol/week: 0.0 oz     Current Outpatient Medications   Medication Sig Dispense Refill  budesonide-formoterol (SYMBICORT) 80-4.5 MCG/ACT AERO Inhale 2 puffs into the lungs 2 times daily      LORazepam (ATIVAN) 0.5 MG tablet Take 0.5 mg by mouth daily as needed (severe anxiety). John Kruse hydrOXYzine (VISTARIL) 25 MG capsule Take 25 mg by mouth 3 times daily as needed for Anxiety To use while buspar is on back order      PARoxetine (PAXIL) 10 MG tablet Take 10 mg by mouth nightly      busPIRone (BUSPAR) 10 MG tablet Take 2 tablets by mouth 3 times daily 60 tablet 11    lisinopril (PRINIVIL;ZESTRIL) 5 MG tablet Take 1 tablet by mouth daily 30 tablet 0    metoprolol tartrate (LOPRESSOR) 25 MG tablet Take 1 tablet by mouth 2 times daily 60 tablet 0    bethanechol (URECHOLINE) 25 MG tablet Take 1 tablet by mouth 4 times daily 90 tablet 0    albuterol (PROVENTIL) (2.5 MG/3ML) 0.083% nebulizer solution Take 2.5 mg by nebulization every 6 hours as needed for Wheezing      Multiple Vitamin (MULTI VITAMIN DAILY) TABS Take 1 tablet by mouth daily Take 1 tab po daily 30 tablet 11    fluticasone (FLONASE) 50 MCG/ACT nasal spray 1 spray by Nasal route daily 1 Bottle 11    hydrochlorothiazide (MICROZIDE) 12.5 MG capsule take 1 capsule by mouth once daily 30 capsule 11    Calcium Carbonate (CALCIUM 600 PO) Take 600 mg by mouth 2 times daily      loratadine (CLARITIN) 10 MG capsule Take 1 capsule by mouth daily 30 capsule 11    isosorbide mononitrate (IMDUR) 30 MG extended release tablet Take 1 tablet by mouth daily 30 tablet 11    potassium chloride (KLOR-CON M) 20 MEQ extended release tablet Take 1 tablet by mouth 2 times daily 60 tablet 11    aspirin 81 MG chewable tablet Take 1 tablet by mouth daily 30 tablet 11    acetaminophen (TYLENOL) 325 MG tablet 1-2 tablets every 4 hours prn pain/ fever.  120 tablet 11    Cholecalciferol (VITAMIN D) 2000 UNITS CAPS capsule Take 4,000 Units by mouth daily      nitroGLYCERIN (NITROSTAT) 0.4 MG SL tablet Place 1 tablet under the tongue every 5 minutes as needed for Chest pain 25 tablet 3    aluminum & magnesium hydroxide-simethicone (MAALOX REGULAR STRENGTH) 200-200-20 MG/5ML SUSP suspension Take 30 mLs by mouth every 6 hours as needed for Indigestion 1 Bottle 5    mirtazapine (REMERON) 15 MG tablet Take 0.5 tablets by mouth nightly 45 tablet 3    levothyroxine (SYNTHROID) 50 MCG tablet Take 1 tablet by mouth Daily 30 tablet 11    donepezil (ARICEPT) 10 MG tablet Take 10 mg by mouth daily      simethicone (MYLICON) 617 MG chewable tablet Take 125 mg by mouth every 6 hours as needed for Flatulence      OXYGEN Inhale 2 L/min into the lungs nightly.  budesonide-formoterol (SYMBICORT) 80-4.5 MCG/ACT AERO Inhale 2 puffs into the lungs 2 times daily 1 Inhaler 11     No current facility-administered medications for this visit. Allergies   Allergen Reactions    Amlodipine Other (See Comments)     Pt says she got all the side effects      Sulfa Antibiotics     Cleocin [Clindamycin] Rash    Macrolides And Ketolides Rash     \"All mycins\"    Pcn [Penicillins] Rash    Tetracyclines & Related Rash    Zithromax [Azithromycin] Rash       Subjective:     Review of Systems   Constitutional: Negative for activity change, appetite change, chills, fatigue and fever. Eyes: Negative for visual disturbance. Respiratory: Positive for chest tightness and shortness of breath. Negative for cough and wheezing. Cardiovascular: Negative for chest pain, palpitations and leg swelling. Gastrointestinal: Negative for abdominal pain, constipation, diarrhea and nausea. Genitourinary: Negative for difficulty urinating. Skin: Positive for rash (zoster is improving). Neurological: Positive for headaches. Negative for dizziness, syncope, weakness and light-headedness. Objective:      Physical Exam   Constitutional: She is oriented to person, place, and time. She appears well-developed and well-nourished. No distress.    Eyes: Conjunctivae are normal.   Neck:

## 2019-06-26 ENCOUNTER — OUTSIDE SERVICES (OUTPATIENT)
Dept: FAMILY MEDICINE CLINIC | Age: 84
End: 2019-06-26
Payer: MEDICARE

## 2019-06-26 DIAGNOSIS — F02.80 LATE ONSET ALZHEIMER'S DISEASE WITHOUT BEHAVIORAL DISTURBANCE (HCC): ICD-10-CM

## 2019-06-26 DIAGNOSIS — J41.1 MUCOPURULENT CHRONIC BRONCHITIS (HCC): Primary | ICD-10-CM

## 2019-06-26 DIAGNOSIS — I10 ESSENTIAL HYPERTENSION: ICD-10-CM

## 2019-06-26 DIAGNOSIS — F41.9 ANXIETY: ICD-10-CM

## 2019-06-26 DIAGNOSIS — G30.1 LATE ONSET ALZHEIMER'S DISEASE WITHOUT BEHAVIORAL DISTURBANCE (HCC): ICD-10-CM

## 2019-06-27 PROCEDURE — 99309 SBSQ NF CARE MODERATE MDM 30: CPT | Performed by: FAMILY MEDICINE

## 2019-06-27 ASSESSMENT — ENCOUNTER SYMPTOMS
DIARRHEA: 0
CONSTIPATION: 0
COUGH: 1
SINUS PRESSURE: 1
WHEEZING: 0
NAUSEA: 0
CHEST TIGHTNESS: 1
ABDOMINAL PAIN: 0
SHORTNESS OF BREATH: 1

## 2019-06-27 NOTE — PROGRESS NOTES
1956 [address] [facility]  Dept: [phone]  Dept Fax: [phone]  Loc: [phone]    Marietta Gonzalez is a 80 y.o. female who presents today for her medical conditions/complaints as noted below. Marietta Gonzalez is c/o of   Chief Complaint   Patient presents with    Anxiety    Hypertension    Dementia    COPD       HPI:     HPI Sendy Green was seen for her regular monthly follow up while at [facility]. She has been doing about the same. She is still worried about her breathing and she continues to feel like her chest is tight. She is using her nebulizers several times a day but while seeing her she is convinced she has not used it yet today. She denies any issues with chest pain or palpitations. She reports that her appetite has been excellent and she continues to eat very well. She has not had any swelling in her legs. Staff has not noticed any significant worsening of her anxiety recently. She has been much more stable since she has been back in her room. 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  TC, RRT 4/20/18   Substance Use Topics    Alcohol use: No     Alcohol/week: 0.0 oz     Current Outpatient Medications   Medication Sig Dispense Refill    budesonide-formoterol (SYMBICORT) 80-4.5 MCG/ACT AERO Inhale 2 puffs into the lungs 2 times daily      PARoxetine (PAXIL) 10 MG tablet Take 10 mg by mouth nightly      MG tablet Take 10 mg by mouth daily      simethicone (MYLICON) 696 MG chewable tablet Take 125 mg by mouth every 6 hours as needed for Flatulence      OXYGEN Inhale 2 L/min into the lungs nightly.  LORazepam (ATIVAN) 0.5 MG tablet Take 0.5 mg by mouth daily as needed (severe anxiety). [facility] hydrOXYzine (VISTARIL) 25 MG capsule Take 25 mg by mouth 3 times daily as needed for Anxiety To use while buspar is on back order      budesonide-formoterol (SYMBICORT) 80-4.5 MCG/ACT AERO Inhale 2 puffs into the lungs 2 times daily 1 Inhaler 11     No current facility-administered medications for this visit. Allergies   Allergen Reactions    Amlodipine Other (See Comments)     Pt says she got all the side effects      Sulfa Antibiotics     Cleocin [Clindamycin] Rash    Macrolides And Ketolides Rash     \"All mycins\"    Pcn [Penicillins] Rash    Tetracyclines & Related Rash    Zithromax [Azithromycin] Rash       Subjective:     Review of Systems   Constitutional: Negative for activity change, appetite change, chills, fatigue and fever. HENT: Positive for congestion, postnasal drip and sinus pressure. Eyes: Negative for visual disturbance. Respiratory: Positive for cough, chest tightness and shortness of breath. Negative for wheezing. Cardiovascular: Negative for chest pain, palpitations and leg swelling. Gastrointestinal: Negative for abdominal pain, constipation, diarrhea and nausea. Genitourinary: Negative for difficulty urinating. Skin: Negative for rash. Neurological: Negative for dizziness, syncope, weakness, light-headedness and headaches. Psychiatric/Behavioral: Positive for confusion and decreased concentration. Negative for agitation, dysphoric mood and sleep disturbance. The patient is nervous/anxious. Objective:      Physical Exam   Constitutional: She is oriented to person, place, and time. She appears well-developed and well-nourished. No distress.    Eyes:

## 2019-07-30 ENCOUNTER — OUTSIDE SERVICES (OUTPATIENT)
Dept: FAMILY MEDICINE CLINIC | Age: 84
End: 2019-07-30
Payer: MEDICARE

## 2019-07-30 DIAGNOSIS — J41.1 MUCOPURULENT CHRONIC BRONCHITIS (HCC): Primary | ICD-10-CM

## 2019-07-30 DIAGNOSIS — G30.1 LATE ONSET ALZHEIMER'S DISEASE WITHOUT BEHAVIORAL DISTURBANCE (HCC): ICD-10-CM

## 2019-07-30 DIAGNOSIS — F02.80 LATE ONSET ALZHEIMER'S DISEASE WITHOUT BEHAVIORAL DISTURBANCE (HCC): ICD-10-CM

## 2019-07-30 DIAGNOSIS — F22 PARANOIA (HCC): ICD-10-CM

## 2019-07-30 DIAGNOSIS — I10 ESSENTIAL HYPERTENSION: ICD-10-CM

## 2019-07-30 PROCEDURE — 99309 SBSQ NF CARE MODERATE MDM 30: CPT | Performed by: FAMILY MEDICINE

## 2019-08-04 ASSESSMENT — ENCOUNTER SYMPTOMS
CHEST TIGHTNESS: 1
SHORTNESS OF BREATH: 1
SINUS PRESSURE: 1
DIARRHEA: 0
COUGH: 0
CONSTIPATION: 0
ABDOMINAL PAIN: 0
WHEEZING: 0
NAUSEA: 0

## 2019-08-26 ENCOUNTER — OUTSIDE SERVICES (OUTPATIENT)
Dept: FAMILY MEDICINE CLINIC | Age: 84
End: 2019-08-26
Payer: MEDICARE

## 2019-08-26 DIAGNOSIS — G30.1 LATE ONSET ALZHEIMER'S DISEASE WITHOUT BEHAVIORAL DISTURBANCE (HCC): ICD-10-CM

## 2019-08-26 DIAGNOSIS — F02.80 LATE ONSET ALZHEIMER'S DISEASE WITHOUT BEHAVIORAL DISTURBANCE (HCC): ICD-10-CM

## 2019-08-26 DIAGNOSIS — F22 PARANOIA (HCC): ICD-10-CM

## 2019-08-26 DIAGNOSIS — I10 ESSENTIAL HYPERTENSION: ICD-10-CM

## 2019-08-26 DIAGNOSIS — J41.1 MUCOPURULENT CHRONIC BRONCHITIS (HCC): Primary | ICD-10-CM

## 2019-08-26 PROCEDURE — 99309 SBSQ NF CARE MODERATE MDM 30: CPT | Performed by: FAMILY MEDICINE

## 2019-08-30 ASSESSMENT — ENCOUNTER SYMPTOMS
SHORTNESS OF BREATH: 0
CONSTIPATION: 0
WHEEZING: 0
CHEST TIGHTNESS: 1
DIARRHEA: 0
ABDOMINAL PAIN: 0
NAUSEA: 0
COUGH: 0
SINUS PRESSURE: 0

## 2019-09-28 ENCOUNTER — OUTSIDE SERVICES (OUTPATIENT)
Dept: FAMILY MEDICINE CLINIC | Age: 84
End: 2019-09-28
Payer: MEDICARE

## 2019-09-28 DIAGNOSIS — F41.9 ANXIETY: ICD-10-CM

## 2019-09-28 DIAGNOSIS — I10 ESSENTIAL HYPERTENSION: ICD-10-CM

## 2019-09-28 DIAGNOSIS — J44.1 COPD WITH ACUTE EXACERBATION (HCC): Primary | ICD-10-CM

## 2019-09-28 PROCEDURE — 99309 SBSQ NF CARE MODERATE MDM 30: CPT | Performed by: FAMILY MEDICINE

## 2019-09-28 RX ORDER — LEVOFLOXACIN 500 MG/1
500 TABLET, FILM COATED ORAL DAILY
Qty: 5 TABLET | Refills: 0 | OUTPATIENT
Start: 2019-09-25 | End: 2019-10-22 | Stop reason: ALTCHOICE

## 2019-09-28 RX ORDER — PREDNISONE 20 MG/1
20 TABLET ORAL 2 TIMES DAILY
Qty: 10 TABLET | Refills: 0 | OUTPATIENT
Start: 2019-09-25 | End: 2019-09-30

## 2019-09-28 ASSESSMENT — ENCOUNTER SYMPTOMS
DIARRHEA: 0
COUGH: 1
SORE THROAT: 1
CHOKING: 0
NAUSEA: 0
CHEST TIGHTNESS: 0
WHEEZING: 1
TROUBLE SWALLOWING: 0
CONSTIPATION: 0
SHORTNESS OF BREATH: 1
SINUS PRESSURE: 1

## 2019-09-28 NOTE — PROGRESS NOTES
tablet by mouth daily     Dispense:  5 tablet     Refill:  0    predniSONE (DELTASONE) 20 MG tablet     Sig: Take 1 tablet by mouth 2 times daily for 5 days     Dispense:  10 tablet     Refill:  0       Patientgiven educational materials - see patient instructions. Discussed use, benefit,and side effects of prescribed medications. All patient questions answered. Ptvoiced understanding. Reviewed health maintenance. Instructed to continue currentmedications, diet and exercise. Patient agreed with treatment plan. Follow up asdirected.      Electronically signed by Florentin Gutierrez MD on 9/28/2019 at 9:54 AM

## 2019-10-22 ENCOUNTER — OUTSIDE SERVICES (OUTPATIENT)
Dept: PRIMARY CARE CLINIC | Age: 84
End: 2019-10-22
Payer: MEDICARE

## 2019-10-22 DIAGNOSIS — F41.9 ANXIETY: ICD-10-CM

## 2019-10-22 DIAGNOSIS — I10 ESSENTIAL HYPERTENSION: ICD-10-CM

## 2019-10-22 DIAGNOSIS — G30.1 LATE ONSET ALZHEIMER'S DISEASE WITHOUT BEHAVIORAL DISTURBANCE (HCC): ICD-10-CM

## 2019-10-22 DIAGNOSIS — J41.1 MUCOPURULENT CHRONIC BRONCHITIS (HCC): Primary | ICD-10-CM

## 2019-10-22 DIAGNOSIS — F02.80 LATE ONSET ALZHEIMER'S DISEASE WITHOUT BEHAVIORAL DISTURBANCE (HCC): ICD-10-CM

## 2019-10-22 PROCEDURE — 99309 SBSQ NF CARE MODERATE MDM 30: CPT | Performed by: FAMILY MEDICINE

## 2019-10-24 ASSESSMENT — ENCOUNTER SYMPTOMS
ABDOMINAL PAIN: 0
SHORTNESS OF BREATH: 0
CHEST TIGHTNESS: 0
DIARRHEA: 0
NAUSEA: 0
CONSTIPATION: 0
COUGH: 0
WHEEZING: 0

## 2019-11-30 ENCOUNTER — OUTSIDE SERVICES (OUTPATIENT)
Dept: PRIMARY CARE CLINIC | Age: 84
End: 2019-11-30
Payer: MEDICARE

## 2019-11-30 DIAGNOSIS — I10 ESSENTIAL HYPERTENSION: ICD-10-CM

## 2019-11-30 DIAGNOSIS — J41.1 MUCOPURULENT CHRONIC BRONCHITIS (HCC): Primary | ICD-10-CM

## 2019-11-30 DIAGNOSIS — F22 PARANOIA (HCC): ICD-10-CM

## 2019-11-30 PROCEDURE — 99309 SBSQ NF CARE MODERATE MDM 30: CPT | Performed by: FAMILY MEDICINE

## 2019-11-30 ASSESSMENT — ENCOUNTER SYMPTOMS
SORE THROAT: 1
SHORTNESS OF BREATH: 1
WHEEZING: 0
COUGH: 1
CONSTIPATION: 0
SINUS PAIN: 0
CHEST TIGHTNESS: 1
NAUSEA: 0
SINUS PRESSURE: 1
DIARRHEA: 0
ABDOMINAL PAIN: 0

## 2019-12-26 ENCOUNTER — OUTSIDE SERVICES (OUTPATIENT)
Dept: FAMILY MEDICINE CLINIC | Age: 84
End: 2019-12-26
Payer: MEDICARE

## 2019-12-26 DIAGNOSIS — F41.9 ANXIETY: ICD-10-CM

## 2019-12-26 DIAGNOSIS — J41.1 MUCOPURULENT CHRONIC BRONCHITIS (HCC): Primary | ICD-10-CM

## 2019-12-26 DIAGNOSIS — I10 ESSENTIAL HYPERTENSION: ICD-10-CM

## 2019-12-26 PROCEDURE — 99309 SBSQ NF CARE MODERATE MDM 30: CPT | Performed by: FAMILY MEDICINE

## 2019-12-26 ASSESSMENT — ENCOUNTER SYMPTOMS
SHORTNESS OF BREATH: 0
ABDOMINAL PAIN: 0
DIARRHEA: 0
CHEST TIGHTNESS: 1
CONSTIPATION: 0
WHEEZING: 0
NAUSEA: 0
COUGH: 0

## 2020-01-21 ENCOUNTER — OUTSIDE SERVICES (OUTPATIENT)
Dept: FAMILY MEDICINE CLINIC | Age: 85
End: 2020-01-21
Payer: MEDICARE

## 2020-01-21 PROBLEM — I20.9 ISCHEMIC CHEST PAIN (HCC): Status: ACTIVE | Noted: 2020-01-21

## 2020-01-21 PROCEDURE — 99309 SBSQ NF CARE MODERATE MDM 30: CPT | Performed by: FAMILY MEDICINE

## 2020-01-21 NOTE — PROGRESS NOTES
1956 [address]  [place] [address]  Dept: [phone]  Dept Fax: [phone]  Loc: [phone]    Judy Gatica is a 80 y.o. female who presents today for her medical conditions/complaints as noted below. Judy Gatica is c/o of   Chief Complaint   Patient presents with    Anxiety    Hypertension    COPD    Dementia       HPI:     HPI Dorothea Hoyos was seen for her regular monthly visit while at [facility]. She has been doing about the same as always. During my visit she was complaining of having sinus congestion and mild sob. She is not coughing. No chest pain. She is still getting good relief from her albuterol. She regularly forgets that she takes a nasal spray regularly to help her breathe. She reports that she still has a good appetite and she is not having any issues with constipation or diarrhea. She was upset this week about her car and she is convinced that [employer] stole her car. She also is upset that she does not have easy access to her money. 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  TC, RRT 4/20/18   Substance Use Topics    Alcohol use: No     Alcohol/week: 0.0 standard drinks     Current Outpatient Medications   Medication Sig Dispense Refill    budesonide-formoterol (SYMBICORT) 80-4.5 MCG/ACT AERO Inhale 2 puffs into the lungs 2 times daily      PARoxetine (PAXIL) 10 MG tablet Take 10 mg by mouth understanding. Reviewed health maintenance. Instructed to continue currentmedications, diet and exercise. Patient agreed with treatment plan. Follow up asdirected.      Electronically signed by Sarah Jaeger MD on 1/21/2020 at 2:11 PM

## 2020-01-29 ASSESSMENT — ENCOUNTER SYMPTOMS
CHOKING: 0
CHEST TIGHTNESS: 1
COUGH: 0
DIARRHEA: 0
WHEEZING: 0
SINUS PRESSURE: 0
CONSTIPATION: 0
ABDOMINAL PAIN: 0
SHORTNESS OF BREATH: 1
NAUSEA: 0
TROUBLE SWALLOWING: 0
SORE THROAT: 0

## 2020-02-17 ENCOUNTER — OUTSIDE SERVICES (OUTPATIENT)
Dept: FAMILY MEDICINE CLINIC | Age: 85
End: 2020-02-17
Payer: MEDICARE

## 2020-02-17 PROCEDURE — 99309 SBSQ NF CARE MODERATE MDM 30: CPT | Performed by: FAMILY MEDICINE

## 2020-02-22 ASSESSMENT — ENCOUNTER SYMPTOMS
CONSTIPATION: 0
WHEEZING: 0
COUGH: 0
SINUS PRESSURE: 0
DIARRHEA: 0
SORE THROAT: 0
CHEST TIGHTNESS: 1
NAUSEA: 0
SHORTNESS OF BREATH: 1
CHOKING: 0
TROUBLE SWALLOWING: 0
ABDOMINAL PAIN: 0

## 2020-03-09 ENCOUNTER — OUTSIDE SERVICES (OUTPATIENT)
Dept: FAMILY MEDICINE CLINIC | Age: 85
End: 2020-03-09
Payer: MEDICARE

## 2020-03-09 PROCEDURE — 99309 SBSQ NF CARE MODERATE MDM 30: CPT | Performed by: FAMILY MEDICINE

## 2020-03-09 NOTE — PROGRESS NOTES
[address]  [address] [address]  Dept: [phone]  Dept Fax: [phone]  Loc: [phone]    Philippe Oconnell is a 80 y.o. female who presents today for her medical conditions/complaints as noted below. Philippe Oconnell is c/o of   Chief Complaint   Patient presents with    Anxiety    Hypertension    Dementia       HPI:     [facility] was seen for her regular monthly follow up while at [facility]. She has been doing okay. She is feeling congested and tight today. She is also a little short of breath. She is not coughing too much but she does have a dry tickle type cough. She is not having any issues with chest pain and she is not having any abdominal pain. She continues to get good relief from her breathing treatments. She is still eating very well and she loves to snack. She is not having any issues with leg swelling. Nursing has not had any issues with behavioral problems from her and she continues to be anxious about the weather and her breathing. 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  TC, RRT 4/20/18   Substance Use Topics    Alcohol use: No     Alcohol/week: 0.0 standard drinks     Current Outpatient Medications   Medication Sig Dispense Refill    PARoxetine (PAXIL) 10 MG tablet Take 10 mg by mouth nightly      busPIRone (BUSPAR) 10 MG tablet Take 2 tablets by mouth 3 times daily (Patient taking differently: Take 7.5 mg by mouth nightly 10mg in morning, 7.5mg at night) 60 tablet 11    lisinopril (PRINIVIL;ZESTRIL) 5 MG tablet Take 1 tablet by mouth daily 30 tablet 0    metoprolol tartrate (LOPRESSOR) 25 MG tablet Take 1 tablet by mouth 2 times daily 60 tablet 0    bethanechol (URECHOLINE) 25 MG tablet Take 1 tablet by mouth 4 times daily 90 tablet 0    albuterol (PROVENTIL) (2.5 MG/3ML) 0.083% nebulizer solution Take 2.5 mg by nebulization every 6 hours as needed for Wheezing      Multiple Vitamin (MULTI VITAMIN DAILY) TABS Take 1 tablet by mouth daily Take 1 tab po daily 30 tablet 11    fluticasone (FLONASE) 50 MCG/ACT nasal spray 1 spray by Nasal route daily 1 Bottle 11    hydrochlorothiazide (MICROZIDE) 12.5 MG capsule take 1 capsule by mouth once daily 30 capsule 11    Calcium Carbonate (CALCIUM 600 PO) Take 600 mg by mouth 2 times daily      loratadine (CLARITIN) 10 MG capsule Take 1 capsule by mouth daily 30 capsule 11    isosorbide mononitrate (IMDUR) 30 MG extended release tablet Take 1 tablet by mouth daily 30 tablet 11    potassium chloride (KLOR-CON M) 20 MEQ extended release tablet Take 1 tablet by mouth 2 times daily 60 tablet 11    aspirin 81 MG chewable tablet Take 1 tablet by mouth daily 30 tablet 11    acetaminophen (TYLENOL) 325 MG tablet 1-2 tablets every 4 hours prn pain/ fever.  120 tablet 11    budesonide-formoterol (SYMBICORT) 80-4.5 MCG/ACT AERO Inhale 2 puffs into the lungs 2 times daily 1 Inhaler 11    Cholecalciferol (VITAMIN D) 2000 UNITS CAPS capsule Take 4,000 Units by mouth daily      nitroGLYCERIN (NITROSTAT) 0.4 MG SL tablet Place 1 tablet under the tongue every 5 minutes as needed for Chest pain 25 tablet 3    aluminum & magnesium hydroxide-simethicone (MAALOX REGULAR STRENGTH) 200-200-20 MG/5ML SUSP suspension Take 30 mLs by mouth every 6 hours as needed for Indigestion 1 Bottle 5    mirtazapine (REMERON) 15 MG tablet Take 0.5 tablets by mouth nightly 45 tablet 3    levothyroxine (SYNTHROID) 50 MCG tablet Take 1 tablet by mouth Daily 30 tablet 11    donepezil (ARICEPT) 10 MG tablet Take 10 mg by mouth daily      simethicone (MYLICON) 958 MG chewable tablet Take 125 mg by mouth every 6 hours as needed for Flatulence      OXYGEN Inhale 2 L/min into the lungs nightly. No current facility-administered medications for this visit. Allergies   Allergen Reactions    Amlodipine Other (See Comments)     Pt says she got all the side effects      Sulfa Antibiotics     Cleocin [Clindamycin] Rash    Macrolides And Ketolides Rash     \"All mycins\"    Pcn [Penicillins] Rash    Tetracyclines & Related Rash    Zithromax [Azithromycin] Rash       Subjective:     Review of Systems   Constitutional: Negative for activity change, appetite change, chills, fatigue and fever. HENT: Positive for congestion and postnasal drip. Negative for ear pain, sinus pressure, sneezing, sore throat and trouble swallowing. Eyes: Negative for visual disturbance. Respiratory: Positive for chest tightness and shortness of breath (mild). Negative for cough, choking and wheezing. Cardiovascular: Negative for chest pain, palpitations and leg swelling. Gastrointestinal: Negative for abdominal pain, constipation, diarrhea and nausea. Skin: Negative for rash. Allergic/Immunologic: Positive for environmental allergies. Neurological: Positive for headaches. Negative for syncope and weakness. Psychiatric/Behavioral: Positive for confusion and decreased concentration. Negative for dysphoric mood and sleep disturbance. The patient is not nervous/anxious. Objective:      Physical Exam  Vitals signs and nursing note reviewed. Constitutional:       General: She is not in acute distress. Appearance: She is well-developed.    Eyes:      Conjunctiva/sclera: Conjunctivae normal.   Neck:      Musculoskeletal: Normal range of motion materials - see patient instructions. Discussed use, benefit,and side effects of prescribed medications. All patient questions answered. Ptvoiced understanding. Reviewed health maintenance. Instructed to continue currentmedications, diet and exercise. Patient agreed with treatment plan. Follow up asdirected.      Electronically signed by John Paul Lane MD on 3/9/2020 at 5:33 PM

## 2020-03-13 ASSESSMENT — ENCOUNTER SYMPTOMS
SORE THROAT: 0
WHEEZING: 0
COUGH: 0
CONSTIPATION: 0
SHORTNESS OF BREATH: 1
NAUSEA: 0
ABDOMINAL PAIN: 0
DIARRHEA: 0
CHOKING: 0
SINUS PRESSURE: 0
CHEST TIGHTNESS: 1
TROUBLE SWALLOWING: 0

## 2020-04-16 ENCOUNTER — OUTSIDE SERVICES (OUTPATIENT)
Dept: FAMILY MEDICINE CLINIC | Age: 85
End: 2020-04-16
Payer: MEDICARE

## 2020-04-16 PROCEDURE — 99309 SBSQ NF CARE MODERATE MDM 30: CPT | Performed by: FAMILY MEDICINE

## 2020-04-17 ASSESSMENT — ENCOUNTER SYMPTOMS
DIARRHEA: 0
CONSTIPATION: 0
COUGH: 0
WHEEZING: 0
CHEST TIGHTNESS: 0
ABDOMINAL PAIN: 0
SHORTNESS OF BREATH: 0

## 2020-04-17 NOTE — PROGRESS NOTES
nebulizer solution Take 2.5 mg by nebulization every 6 hours as needed for Wheezing Yes Historical Provider, MD   Multiple Vitamin (MULTI VITAMIN DAILY) TABS Take 1 tablet by mouth daily Take 1 tab po daily Yes Wojciech Reynolds MD   fluticasone (FLONASE) 50 MCG/ACT nasal spray 1 spray by Nasal route daily Yes Wojciech Reynolds MD   hydrochlorothiazide (MICROZIDE) 12.5 MG capsule take 1 capsule by mouth once daily Yes Wojciech Reynolds MD   Calcium Carbonate (CALCIUM 600 PO) Take 600 mg by mouth 2 times daily Yes Historical Provider, MD   loratadine (CLARITIN) 10 MG capsule Take 1 capsule by mouth daily Yes Wojciech Reynolds MD   isosorbide mononitrate (IMDUR) 30 MG extended release tablet Take 1 tablet by mouth daily Yes Wojciech Reynolds MD   potassium chloride (KLOR-CON M) 20 MEQ extended release tablet Take 1 tablet by mouth 2 times daily Yes Wojciech Reynolds MD   aspirin 81 MG chewable tablet Take 1 tablet by mouth daily Yes Wojciech Reynolds MD   acetaminophen (TYLENOL) 325 MG tablet 1-2 tablets every 4 hours prn pain/ fever.  Yes Wojciech Reynolds MD   budesonide-formoterol (SYMBICORT) 80-4.5 MCG/ACT AERO Inhale 2 puffs into the lungs 2 times daily Yes Alva Dubose MD   Cholecalciferol (VITAMIN D) 2000 UNITS CAPS capsule Take 4,000 Units by mouth daily Yes Historical Provider, MD   nitroGLYCERIN (NITROSTAT) 0.4 MG SL tablet Place 1 tablet under the tongue every 5 minutes as needed for Chest pain Yes Carissa Ritchie MD   aluminum & magnesium hydroxide-simethicone (MAALOX REGULAR STRENGTH) 200-200-20 MG/5ML SUSP suspension Take 30 mLs by mouth every 6 hours as needed for Indigestion Yes Wojciech Reynolds MD   mirtazapine (REMERON) 15 MG tablet Take 0.5 tablets by mouth nightly Yes Wojciech Reynolds MD   levothyroxine (SYNTHROID) 50 MCG tablet Take 1 tablet by mouth Daily Yes Wojciech Reynolds MD   donepezil (ARICEPT) 10 MG tablet Take 10 mg by mouth daily Yes Historical Provider, MD   simethicone (Michalene Primrose) 125 MG chewable tablet Take 125 mg by mouth every 6 hours as needed for Flatulence Yes Historical Provider, MD   OXYGEN Inhale 2 L/min into the lungs nightly. Yes Historical Provider, MD     Allergies   Allergen Reactions    Amlodipine Other (See Comments)     Pt says she got all the side effects      Sulfa Antibiotics     Cleocin [Clindamycin] Rash    Macrolides And Ketolides Rash     \"All mycins\"    Pcn [Penicillins] Rash    Tetracyclines & Related Rash    Zithromax [Azithromycin] Rash       Subjective:      Review of Systems   Constitutional: Negative for activity change, appetite change, chills, fatigue and fever. Respiratory: Negative for cough, chest tightness, shortness of breath and wheezing. Cardiovascular: Negative for chest pain, palpitations and leg swelling. Gastrointestinal: Negative for abdominal pain, constipation and diarrhea. Genitourinary: Negative for difficulty urinating. Skin: Negative for rash. Neurological: Negative for dizziness, syncope, weakness, light-headedness and headaches. Psychiatric/Behavioral: Positive for confusion and decreased concentration. Negative for behavioral problems, dysphoric mood and sleep disturbance. The patient is not nervous/anxious. Objective:     Physical Exam  Vitals signs and nursing note reviewed. Constitutional:       General: She is not in acute distress. Appearance: She is well-developed. Eyes:      Conjunctiva/sclera: Conjunctivae normal.   Neck:      Musculoskeletal: Normal range of motion and neck supple. Thyroid: No thyromegaly. Cardiovascular:      Rate and Rhythm: Normal rate and regular rhythm. Heart sounds: Normal heart sounds. No murmur. Pulmonary:      Effort: Pulmonary effort is normal. No respiratory distress. Breath sounds: Normal breath sounds. No wheezing. Abdominal:      General: Abdomen is flat. Bowel sounds are normal.      Palpations: Abdomen is soft. Tenderness:  There is no

## 2020-05-08 ENCOUNTER — TELEPHONE (OUTPATIENT)
Dept: FAMILY MEDICINE CLINIC | Age: 85
End: 2020-05-08

## 2020-05-08 RX ORDER — GUAIFENESIN 100 MG/5ML
10 SYRUP ORAL 4 TIMES DAILY PRN
COMMUNITY

## 2020-05-08 RX ORDER — ACETAMINOPHEN 325 MG/1
650 TABLET ORAL 2 TIMES DAILY
COMMUNITY
End: 2022-09-15

## 2020-05-08 RX ORDER — ALBUTEROL SULFATE 2.5 MG/3ML
2.5 SOLUTION RESPIRATORY (INHALATION) 2 TIMES DAILY
COMMUNITY

## 2020-05-08 RX ORDER — ACETAMINOPHEN 500 MG
500 TABLET ORAL EVERY 4 HOURS PRN
Status: ON HOLD | COMMUNITY
End: 2022-09-19

## 2020-05-08 RX ORDER — BENZONATATE 200 MG/1
200 CAPSULE ORAL 3 TIMES DAILY PRN
COMMUNITY

## 2020-05-13 ENCOUNTER — OUTSIDE SERVICES (OUTPATIENT)
Dept: FAMILY MEDICINE CLINIC | Age: 85
End: 2020-05-13
Payer: MEDICARE

## 2020-05-13 PROCEDURE — 99309 SBSQ NF CARE MODERATE MDM 30: CPT | Performed by: FAMILY MEDICINE

## 2020-05-15 ASSESSMENT — ENCOUNTER SYMPTOMS
CHEST TIGHTNESS: 0
SINUS PRESSURE: 1
CONSTIPATION: 0
COUGH: 1
DIARRHEA: 0
WHEEZING: 0
SHORTNESS OF BREATH: 0
SINUS PAIN: 1
ABDOMINAL PAIN: 0

## 2020-05-15 NOTE — PROGRESS NOTES
[facility] [address]  [address] [address]  Dept: [phone]  Dept Fax: [phone]  Loc: [phone]    Jo-Ann Vargas  is a 80 y.o. female who presents today for her medical conditions/complaints as noted below. Jo-Ann Vargas is c/o of     Chief Complaint   Patient presents with    Hypertension    Anxiety    Cough    Dementia       HPI:   Varun Owens is being seen for her regular monthly follow up while at [facility]. She is doing pretty well. She continues to complain of chest tightness and a chronic cough. She complains about this every single time I see her, nothing has changed for her report. She admits that she always has a cough, and she always blames the weather for it. She finds that she has a cough if she is in hot weather, cold weather, humid weather, dry weather it all makes her cough. She is aware of this, she just always like to point it out to me. She claims that she is having some sinus congestion and drainage, which is very consistent with what she reports to me every month. She is not noticing any worsening of her symptoms. She has not had any fevers or ear pain. No sore throat. She just has chronic sinus pressure with some sinus headaches and some nasal drip. She is still getting good relief from her albuterol breathing treatments and she does it pretty regular basis. She also takes Tylenol at least once a day for her headaches, and she does find that this is pretty effective. She has an excellent appetite and she is still eating very well and enjoy snacking. She has not had any problems with abdominal pain, constipation, or diarrhea. She is sleeping well. She has not had any issues where she had felt particularly anxious or panicking. She denies any issues with chest pain. She continues to be somewhat anxious as a general rule, and she is very paranoid about her lungs.  She constantly suspension Take 30 mLs by mouth every 6 hours as needed for Indigestion Yes Nik Tovar MD   mirtazapine (REMERON) 15 MG tablet Take 0.5 tablets by mouth nightly Yes Nik Tovar MD   levothyroxine (SYNTHROID) 50 MCG tablet Take 1 tablet by mouth Daily Yes Nik Tovar MD   donepezil (ARICEPT) 10 MG tablet Take 10 mg by mouth daily Yes Historical Provider, MD   simethicone (MYLICON) 376 MG chewable tablet Take 125 mg by mouth every 6 hours as needed for Flatulence Yes Historical Provider, MD   OXYGEN Inhale 2 L/min into the lungs nightly. Yes Historical Provider, MD   budesonide-formoterol (SYMBICORT) 80-4.5 MCG/ACT AERO Inhale 2 puffs into the lungs 2 times daily  Katharine Ramos MD     Allergies   Allergen Reactions    Amlodipine Other (See Comments)     Pt says she got all the side effects      Sulfa Antibiotics     Cleocin [Clindamycin] Rash    Macrolides And Ketolides Rash     \"All mycins\"    Pcn [Penicillins] Rash    Tetracyclines & Related Rash    Zithromax [Azithromycin] Rash       Subjective:      Review of Systems   Constitutional: Negative for activity change, appetite change, chills, fatigue and fever. HENT: Positive for congestion, sinus pressure and sinus pain. Respiratory: Positive for cough. Negative for chest tightness, shortness of breath and wheezing. Cardiovascular: Negative for chest pain, palpitations and leg swelling. Gastrointestinal: Negative for abdominal pain, constipation and diarrhea. Genitourinary: Negative for difficulty urinating. Skin: Negative for rash. Neurological: Positive for headaches. Negative for dizziness, syncope, weakness and light-headedness. Psychiatric/Behavioral: Positive for confusion and decreased concentration. Negative for behavioral problems, dysphoric mood and sleep disturbance. The patient is not nervous/anxious. Objective:     Physical Exam  Vitals signs and nursing note reviewed.    Constitutional:       General: I will continue her current medications and continue to monitor. 4. Alzheimer's dementia. Stable. She has been doing pretty well recently. She has not had any issues with significant worsening of her memory. She continues to remember who I am, and she remembers my children. She is also not having any issues with behavioral problems or wandering. So I will just continue to monitor her and I will continue her current medications at this time. Return in about 1 month (around 6/13/2020) for HTN follow up. Patient given educational materials - see patient instructions. Discussed use, benefit,and side effects of prescribed medications. All patient questions answered. Patient voiced understanding. Reviewed health maintenance. Instructed to continue current medications, diet and exercise. Patient agreed with treatment plan. Follow up as directed. Sancho Flower am personally transcribing for Kanu Castro MD 5/15/20 at 7:05 AM EDT. Ann-Marie Lovett MD, personally performed the services described in this document as transcribed by the , and it is both accurate and complete.     Electronically signed by Kanu Castro MD on 5/15/20 at 8:22 AM EDT

## 2020-06-10 ENCOUNTER — OUTSIDE SERVICES (OUTPATIENT)
Dept: FAMILY MEDICINE CLINIC | Age: 85
End: 2020-06-10
Payer: MEDICARE

## 2020-06-10 ENCOUNTER — TELEPHONE (OUTPATIENT)
Dept: FAMILY MEDICINE CLINIC | Age: 85
End: 2020-06-10

## 2020-06-10 PROCEDURE — 99309 SBSQ NF CARE MODERATE MDM 30: CPT | Performed by: FAMILY MEDICINE

## 2020-06-11 ASSESSMENT — ENCOUNTER SYMPTOMS
CONSTIPATION: 0
DIARRHEA: 0
WHEEZING: 0
SORE THROAT: 1
CHEST TIGHTNESS: 0
COUGH: 0
SINUS PRESSURE: 1
ABDOMINAL PAIN: 0
SHORTNESS OF BREATH: 0

## 2020-06-11 NOTE — PROGRESS NOTES
[facility] [address] [place] [address]  Dept: [phone]  Dept Fax: [phone]  Loc: [phone]    Prateek Asencio  is a 80 y.o. female who presents today for her medical conditions/complaints as noted below. Prateek Asencio is c/o of     Chief Complaint   Patient presents with    Anxiety    Hypertension    COPD       HPI:   Leroy Rose is being seen for her regular monthly follow up while at [facility]. Patient reports that she is not feeling very well today. She is feeling more tightness in her chest due to the hot and humid weather and she feels like she has more mucus than normal as well. She also reports pain in her throat that is mostly on the right side located near a broken tooth. This pain is bothering her when she swallows and is overall very annoying to her. She has also noticed an increase postnasal drip over the last couple of days and she just generally feels very sinusy and not well at all. She is also quite distraught because she is fighting with her roommate and she is getting agitated because nursing staff has suggested that she switch rooms and she was in the room first, so she does not understand why she would need to move rooms at all. She does complain of chest tightness, but this is a chronic issue for her. She says that her shortness of breath is much worse today than it normally is, which I would suspect is due somewhat to the hot weather with the pending storms because she does seem to be extra sensitive to these. She also reports today that she has a bad headache and in addition to the toothache that she has on the right side as well. Otherwise she says she's feeling about the same as always. She denies any problems with abdominal pain. No chest pain, diarrhea, constipation, or dysuria.  Overall, she says she is doing okay, she is just not feeling well today, and she is very upset about the roommate situation. 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 TC, RRT 4/20/18   Substance Use Topics    Alcohol use: No     Alcohol/week: 0.0 standard drinks      Prior to Visit Medications    Medication Sig Taking?  Authorizing Provider   acetaminophen (TYLENOL) 325 MG tablet Take 650 mg by mouth 2 times daily Yes Historical Provider, MD   albuterol (PROVENTIL) (2.5 MG/3ML) 0.083% nebulizer solution Take 2.5 mg by nebulization 2 times daily Yes Historical Provider, MD   acetaminophen (TYLENOL) 500 MG tablet Take 500 mg by mouth every 4 hours as needed for Pain or Fever Yes Historical Provider, MD   benzonatate (TESSALON) 200 MG capsule Take 200 mg by mouth 3 times daily as needed for Cough Yes Historical Provider, MD   guaiFENesin (ROBITUSSIN) 100 MG/5ML syrup Take 200 mg by mouth 4 times daily as needed for Cough or Congestion Yes Historical Provider, MD   PARoxetine (PAXIL) 10 MG tablet Take 10 mg by mouth nightly Yes Historical Provider, MD   busPIRone (BUSPAR) 10 MG tablet Take 2 tablets by mouth 3 times daily  Patient taking differently: Take 7.5 mg by mouth nightly 10mg in morning, 7.5mg at night Yes Morenita Sosa MD   lisinopril (PRINIVIL;ZESTRIL) 5 MG tablet Take 1 tablet by mouth daily Yes Shayy Mcnamara   metoprolol tartrate (LOPRESSOR) 25 MG tablet Take 1 tablet by mouth 2 times daily Yes Darryle Mail Reeves   bethanechol (URECHOLINE) 25 MG tablet Take 1 tablet by mouth 4 times daily Yes Shayy Mcnamara   Multiple Vitamin (MULTI VITAMIN DAILY) TABS Take 1 tablet by mouth daily Take 1 tab po daily Yes Familia Mock MD   fluticasone (FLONASE) 50 MCG/ACT nasal spray 1 spray by Nasal route daily Yes Familia Mock MD   hydrochlorothiazide (MICROZIDE) 12.5 MG capsule take 1 capsule by mouth once daily Yes Familia Mock MD   Calcium Carbonate (CALCIUM 600 PO) Take 600 mg by mouth 2 times daily Yes Historical Provider, MD   loratadine (CLARITIN) 10 MG capsule Take 1 capsule by mouth daily Yes Familia Mock MD   isosorbide mononitrate (IMDUR) 30 MG extended release tablet Take 1 tablet by mouth daily Yes Familia Mock MD   potassium chloride (KLOR-CON M) 20 MEQ extended release tablet Take 1 tablet by mouth 2 times daily Yes Familia Mock MD   aspirin 81 MG chewable tablet Take 1 tablet by mouth daily Yes Familia Mock MD   budesonide-formoterol (SYMBICORT) 80-4.5 MCG/ACT AERO Inhale 2 puffs into the lungs 2 times daily Yes Felicitas Casas MD   Cholecalciferol (VITAMIN D) 2000 UNITS CAPS capsule Take 4,000 Units by mouth daily Yes Historical Provider, MD   nitroGLYCERIN (NITROSTAT) 0.4 MG SL tablet Place 1 tablet under the tongue every 5 minutes as needed for Chest pain Yes Nkechi Mcneill MD   aluminum & magnesium hydroxide-simethicone (MAALOX REGULAR STRENGTH) 200-200-20 MG/5ML SUSP suspension Take 30 mLs by mouth every 6 hours as needed for Indigestion Yes Familia Mock MD   mirtazapine (REMERON) 15 MG tablet Take 0.5 tablets by mouth nightly Yes Familia Mock MD   levothyroxine (SYNTHROID) 50 MCG tablet Take 1 tablet by mouth Daily Yes Familia Mock MD donepezil (ARICEPT) 10 MG tablet Take 10 mg by mouth daily Yes Historical Provider, MD   simethicone (MYLICON) 668 MG chewable tablet Take 125 mg by mouth every 6 hours as needed for Flatulence Yes Historical Provider, MD   OXYGEN Inhale 2 L/min into the lungs nightly. Yes Historical Provider, MD     Allergies   Allergen Reactions    Amlodipine Other (See Comments)     Pt says she got all the side effects      Sulfa Antibiotics     Cleocin [Clindamycin] Rash    Macrolides And Ketolides Rash     \"All mycins\"    Pcn [Penicillins] Rash    Tetracyclines & Related Rash    Zithromax [Azithromycin] Rash       Subjective:      Review of Systems   Constitutional: Negative for activity change, appetite change, chills, fatigue and fever. HENT: Positive for dental problem, postnasal drip, sinus pressure and sore throat. Respiratory: Negative for cough, chest tightness, shortness of breath and wheezing. Cardiovascular: Negative for chest pain, palpitations and leg swelling. Gastrointestinal: Negative for abdominal pain, constipation and diarrhea. Genitourinary: Negative for difficulty urinating. Skin: Negative for rash. Neurological: Negative for dizziness, syncope, weakness, light-headedness and headaches. Psychiatric/Behavioral: Positive for confusion and decreased concentration. Negative for behavioral problems, dysphoric mood and sleep disturbance. The patient is not nervous/anxious. Objective:     Physical Exam  Vitals signs and nursing note reviewed. Constitutional:       General: She is not in acute distress. Appearance: She is well-developed. HENT:      Mouth/Throat:      Dentition: Dental caries present. Pharynx: Posterior oropharyngeal erythema present. Eyes:      Conjunctiva/sclera: Conjunctivae normal.   Neck:      Musculoskeletal: Normal range of motion and neck supple. Thyroid: No thyromegaly.    Cardiovascular:      Rate and Rhythm: Normal rate and regular controlled. I continue her current dose of medications. 5. Dementia. Stable. She has not been having any issues with severe confusion or wandering or any behavior changes recently. I will just continue to monitor. She tends to get more agitated when she has to go through a big change such as a room move, so I will keep a close eye on her over the next few weeks to make sure that she is adjusting well to the new surroundings. Return in about 1 month (around 7/10/2020) for COPD follow up. Patient given educational materials - see patient instructions. Discussed use, benefit,and side effects of prescribed medications. All patient questions answered. Patient voiced understanding. Reviewed health maintenance. Instructed to continue current medications, diet and exercise. Patient agreed with treatment plan. Follow up as directed. Lizz Ardon am personally transcribing for Stan Ac MD 6/11/20 at 1:35 PM EDT. Carolyn Kan MD, personally performed the services described in this document as transcribed by the , and it is both accurate and complete.     Electronically signed by Stan Ac MD on 6/11/20 at 5:44 PM EDT

## 2020-07-15 ENCOUNTER — OUTSIDE SERVICES (OUTPATIENT)
Dept: FAMILY MEDICINE CLINIC | Age: 85
End: 2020-07-15
Payer: MEDICARE

## 2020-07-15 PROCEDURE — 99309 SBSQ NF CARE MODERATE MDM 30: CPT | Performed by: FAMILY MEDICINE

## 2020-07-16 ASSESSMENT — ENCOUNTER SYMPTOMS
DIARRHEA: 0
CHEST TIGHTNESS: 1
COUGH: 1
WHEEZING: 0
CONSTIPATION: 0
ABDOMINAL PAIN: 0
SINUS PRESSURE: 1

## 2020-07-16 NOTE — PROGRESS NOTES
[facility] [address]  [address] [address]  Dept: [phone]  Dept Fax: [phone]  Loc: [phone]    Elaine Hogan  is a 80 y.o. female who presents today for her medical conditions/complaints as noted below. Elaine Hogan is c/o of     Chief Complaint   Patient presents with    Hypertension    Cough    Anxiety       HPI:   Annie Grandchild is being seen for her regular monthly follow up while at [facility]. Patient reports that she is not feeling very well today. She is feeling short of breath with some chest tightness and a little bit of an increased cough. She also feels like she has a lot more mucus in her throat and she feels that her voice is hoarse. She also reports that she has had some intermittent wheezing of the past several weeks. She blames all of her symptoms on the increase in temperatures and increasing humidity outside as this tends to set off her symptoms. Based on her report I do not think she sounds like her symptoms are much different than her normal, but she does think they are slightly worse. She said usually her albuterol nebulizer helps kind of break up the mucus that she has, but she noticed in the last week or so that it is not really helping with her mucus. She denies any issues with chest pains or leg swelling. She continues to have sinus congestion with significant postnasal drip and she is having regular headaches. This is chronic for her however she is not concerned that it is a new symptom with the increased cough and shortness of breath. Her one concern today is that someone told her she is on a stomach pill and she is not sure what she is taking a stomach pill for because she has never had issues with stomach problems and she generally has been feeling pretty good. I told her I am not sure which pill she is referring to but that I would look into it.  She continues to be a little bit anxious and generally paranoid about her health and her breathing. She is not having any issues though chest pain that she gets sometimes when she is having panic attacks or being overly stressed. I will continue her on the Buspar, since it seems to be working well. 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 TC, RRT 4/20/18   Substance Use Topics    Alcohol use: No     Alcohol/week: 0.0 standard drinks      Prior to Visit Medications    Medication Sig Taking?  Authorizing Provider   acetaminophen (TYLENOL) 325 MG tablet Take 650 mg by mouth 2 times daily Yes Historical Provider, MD   albuterol (PROVENTIL) (2.5 MG/3ML) 0.083% nebulizer solution Take 2.5 mg by nebulization 2 times daily Yes Historical Provider, MD   acetaminophen (TYLENOL) 500 MG tablet Take 500 mg by mouth every 4 hours as needed for Pain or Fever Yes Historical Provider, MD   benzonatate (TESSALON) 200 MG capsule Take 200 mg by mouth 3 times daily as needed for Cough Yes Historical Provider, MD   guaiFENesin (ROBITUSSIN) 100 MG/5ML syrup Take 200 mg by mouth 4 times daily as needed for Cough or Congestion Yes Historical Provider, MD   PARoxetine (PAXIL) 10 MG tablet Take 10 mg by mouth nightly Yes Historical Provider, MD   busPIRone (BUSPAR) 10 MG tablet Take 2 tablets by mouth 3 times daily  Patient taking differently: Take 7.5 mg by mouth nightly 10mg in morning, 7.5mg at night Yes Daysi Cintron MD   lisinopril (PRINIVIL;ZESTRIL) 5 MG tablet Take 1 tablet by mouth daily Yes Edson Carlos   metoprolol tartrate (LOPRESSOR) 25 MG tablet Take 1 tablet by mouth 2 times daily Yes Janeth Stains Tesfaye   bethanechol (URECHOLINE) 25 MG tablet Take 1 tablet by mouth 4 times daily Yes Edson Carlos   Multiple Vitamin (MULTI VITAMIN DAILY) TABS Take 1 tablet by mouth daily Take 1 tab po daily Yes Mariama Britton MD   fluticasone (FLONASE) 50 MCG/ACT nasal spray 1 spray by Nasal route daily Yes Mariama Britton MD   hydrochlorothiazide (MICROZIDE) 12.5 MG capsule take 1 capsule by mouth once daily Yes Mariama Britton MD   Calcium Carbonate (CALCIUM 600 PO) Take 600 mg by mouth 2 times daily Yes Historical Provider, MD   loratadine (CLARITIN) 10 MG capsule Take 1 capsule by mouth daily Yes Mariama Britton MD   isosorbide mononitrate (IMDUR) 30 MG extended release tablet Take 1 tablet by mouth daily Yes Mariama Britton MD   potassium chloride (KLOR-CON M) 20 MEQ extended release tablet Take 1 tablet by mouth 2 times daily Yes Mariama Britton MD   aspirin 81 MG chewable tablet Take 1 tablet by mouth daily Yes Mariama Britton MD   Cholecalciferol (VITAMIN D) 2000 UNITS CAPS capsule Take 4,000 Units by mouth daily Yes Historical Provider, MD   nitroGLYCERIN (NITROSTAT) 0.4 MG SL tablet Place 1 tablet under the tongue every 5 minutes as needed for Chest pain Yes Sharon Keith MD   aluminum & magnesium hydroxide-simethicone (MAALOX REGULAR STRENGTH) 200-200-20 MG/5ML SUSP suspension Take 30 mLs by mouth every 6 hours as needed for Indigestion Yes Mariama Britton MD   mirtazapine (REMERON) 15 MG tablet Take 0.5 tablets by mouth nightly Yes Elder Sanchez MD   levothyroxine (SYNTHROID) 50 MCG tablet Take 1 tablet by mouth Daily Yes Elder Sanchez MD   donepezil (ARICEPT) 10 MG tablet Take 10 mg by mouth daily Yes Historical Provider, MD   simethicone (MYLICON) 220 MG chewable tablet Take 125 mg by mouth every 6 hours as needed for Flatulence Yes Historical Provider, MD   OXYGEN Inhale 2 L/min into the lungs nightly. Yes Historical Provider, MD   budesonide-formoterol (SYMBICORT) 80-4.5 MCG/ACT AERO Inhale 2 puffs into the lungs 2 times daily  Ace Biggs MD     Allergies   Allergen Reactions    Amlodipine Other (See Comments)     Pt says she got all the side effects      Sulfa Antibiotics     Cleocin [Clindamycin] Rash    Macrolides And Ketolides Rash     \"All mycins\"    Pcn [Penicillins] Rash    Tetracyclines & Related Rash    Zithromax [Azithromycin] Rash       Subjective:      Review of Systems   Constitutional: Positive for diaphoresis. Negative for activity change, appetite change, chills, fatigue and fever. HENT: Positive for congestion, postnasal drip and sinus pressure. Respiratory: Positive for cough, chest tightness and shortness of breath. Negative for wheezing. Cardiovascular: Negative for chest pain, palpitations and leg swelling. Gastrointestinal: Negative for abdominal pain, constipation and diarrhea. Genitourinary: Negative for difficulty urinating. Skin: Negative for rash. Neurological: Positive for headaches. Negative for dizziness, syncope, weakness and light-headedness. Psychiatric/Behavioral: Positive for confusion and decreased concentration. Negative for behavioral problems, dysphoric mood and sleep disturbance. The patient is nervous/anxious. Objective:     Physical Exam  Vitals signs and nursing note reviewed. Constitutional:       General: She is not in acute distress. Appearance: She is well-developed.    Eyes:      Conjunctiva/sclera: Conjunctivae normal. Neck:      Musculoskeletal: Normal range of motion and neck supple. Thyroid: No thyromegaly. Cardiovascular:      Rate and Rhythm: Normal rate and regular rhythm. Heart sounds: Normal heart sounds. No murmur. Pulmonary:      Effort: Pulmonary effort is normal. No respiratory distress. Breath sounds: Normal breath sounds. No wheezing. Abdominal:      General: Abdomen is flat. Bowel sounds are normal.      Palpations: Abdomen is soft. Tenderness: There is no abdominal tenderness. There is no guarding or rebound. Lymphadenopathy:      Cervical: No cervical adenopathy. Skin:     General: Skin is warm and dry. Findings: No erythema or rash. Neurological:      General: No focal deficit present. Mental Status: She is alert. Mental status is at baseline. Psychiatric:         Mood and Affect: Mood is anxious. Speech: Speech normal.         Behavior: Behavior normal.         Cognition and Memory: Memory is impaired. Assessment:       Diagnosis Orders   1. Mucopurulent chronic bronchitis ([address]     2. Paranoia ([address]     3. Essential hypertension     4. Late onset Alzheimer's disease without behavioral disturbance ([facility] [address]               Plan:   1. COPD. Stable. she feels like she may be a little bit worse today, but her lungs still sound good to me today. I think her symptoms sound pretty much the same issue every time I see her. I suspect there has not been a significant change in her COPD recently. I will continue her current inhalers and her nebulizers as needed. 2. Paranoia. Stable. she continues to be worried about the air quality in [place] and she worries at the heat outside is making her shortness of breath worse. I did my best to reassure her, but I did not think I convinced her. 3. Hypertension. Stable. She has not had any issues with elevated hypertension in the last several years.  She seems to have issues with hypertensive urgency most often when she gets worked up and worried about something. 4. Dementia. Stable. She continues to be somewhat confused, but she is not having any issues with behavioral problems and she is not wandering. I will just continue to monitor for now. Return in about 1 month (around 8/15/2020) for HTN follow up, Anxiety follow up. Patient given educational materials - see patient instructions. Discussed use, benefit, and side effects of prescribed medications. All patient questions answered. Patient voiced understanding. Reviewed health maintenance. Instructed to continue current medications, diet and exercise. Patient agreed with treatment plan. Follow up as directed. Mata Hitchcock am personally transcribing for Vivienne Dawn MD 7/16/20 at 1:49 PM EDT. Erick Monge MD, personally performed the services described in this document as transcribed by the , and it is both accurate and complete.     Electronically signed by Vivienne Dawn MD on 7/17/20 at 6:07 PM EDT

## 2020-07-17 ENCOUNTER — TELEPHONE (OUTPATIENT)
Dept: FAMILY MEDICINE CLINIC | Age: 85
End: 2020-07-17

## 2020-07-17 ASSESSMENT — ENCOUNTER SYMPTOMS: SHORTNESS OF BREATH: 1

## 2020-08-12 ENCOUNTER — OUTSIDE SERVICES (OUTPATIENT)
Dept: FAMILY MEDICINE CLINIC | Age: 85
End: 2020-08-12
Payer: MEDICARE

## 2020-08-12 PROCEDURE — 99309 SBSQ NF CARE MODERATE MDM 30: CPT | Performed by: FAMILY MEDICINE

## 2020-08-14 ASSESSMENT — ENCOUNTER SYMPTOMS
CONSTIPATION: 0
ABDOMINAL PAIN: 0
WHEEZING: 0
SHORTNESS OF BREATH: 1
DIARRHEA: 0
COUGH: 1
VOICE CHANGE: 1
CHEST TIGHTNESS: 1

## 2020-08-14 NOTE — PROGRESS NOTES
[facility] [address]  [address] [address]  Dept: [phone]  Dept Fax: [phone]  Loc: [phone]    Vicente Gordon  is a 80 y.o. female who presents today for her medical conditions/complaints as noted below. Vicente Gordon is c/o of     Chief Complaint   Patient presents with    Anxiety    COPD    Hypertension    Sinus Problem       HPI:   Esvin Pool is being seen for her regular monthly follow up while at [facility]. She has been doing fairly well. She reports that she to be short of breath, she continues to have a daily cough, and she continues to feel horse. This is all very chronic for her and nothing is significantly worse than normal. She does report today that she has got some chest tightness and shortness of breath, but like always she tells me that it is probably because it is humid outside. Today happens to be hot and humid outside so she may be right today, but she truly tells me that every single time that I see her. At this point I know that her shortness of breath and chest tightness is really her baseline. She only has a mild dry cough today that is not productive, and she is not noticing any wheezing. She reports that she is only using her duoneb once a day in the at bedtime. She always acts surprise when I reminded that she can use it more often than that if needed but when I asked today she denied wanting to have a treatment while I was seeing her, which suggests to me that hurt us tightness is not too bad. She is not really complaining of too much sinus congestion or postnasal drip today. She was not complaining of a sore throat at this time. She does complain of some hoarseness but like I said she has complained about that for the last several years so it is stable at this time and it is not significantly worse. She denies any knew issues with anxiety or depression.  She denies any times daily  Historical Provider, MD   acetaminophen (TYLENOL) 500 MG tablet Take 500 mg by mouth every 4 hours as needed for Pain or Fever  Historical Provider, MD   benzonatate (TESSALON) 200 MG capsule Take 200 mg by mouth 3 times daily as needed for Cough  Historical Provider, MD   guaiFENesin (ROBITUSSIN) 100 MG/5ML syrup Take 200 mg by mouth 4 times daily as needed for Cough or Congestion  Historical Provider, MD   PARoxetine (PAXIL) 10 MG tablet Take 10 mg by mouth nightly  Historical Provider, MD   busPIRone (BUSPAR) 10 MG tablet Take 2 tablets by mouth 3 times daily  Patient taking differently: Take 7.5 mg by mouth nightly 10mg in morning, 7.5mg at night  Mesha Deluca MD   lisinopril (PRINIVIL;ZESTRIL) 5 MG tablet Take 1 tablet by mouth daily  Viva Medin   metoprolol tartrate (LOPRESSOR) 25 MG tablet Take 1 tablet by mouth 2 times daily  Evetta Mask Tesfaye   bethanechol (URECHOLINE) 25 MG tablet Take 1 tablet by mouth 4 times daily  Viva Medin   Multiple Vitamin (MULTI VITAMIN DAILY) TABS Take 1 tablet by mouth daily Take 1 tab po daily  Chad Garrison MD   fluticasone (FLONASE) 50 MCG/ACT nasal spray 1 spray by Nasal route daily  Chad Garrison MD   hydrochlorothiazide (MICROZIDE) 12.5 MG capsule take 1 capsule by mouth once daily  Chad Garrison MD   Calcium Carbonate (CALCIUM 600 PO) Take 600 mg by mouth 2 times daily  Historical Provider, MD   loratadine (CLARITIN) 10 MG capsule Take 1 capsule by mouth daily  Chad Garrison MD   isosorbide mononitrate (IMDUR) 30 MG extended release tablet Take 1 tablet by mouth daily  Chad Garrison MD   potassium chloride (KLOR-CON M) 20 MEQ extended release tablet Take 1 tablet by mouth 2 times daily  Chad Garrison MD   aspirin 81 MG chewable tablet Take 1 tablet by mouth daily  Chad Garrison MD   budesonide-formoterol (SYMBICORT) 80-4.5 MCG/ACT AERO Inhale 2 puffs into the lungs 2 times daily  Bozena De MD   Cholecalciferol (VITAMIN D) 2000 UNITS CAPS capsule Take 4,000 Units by mouth daily  Historical Provider, MD   nitroGLYCERIN (NITROSTAT) 0.4 MG SL tablet Place 1 tablet under the tongue every 5 minutes as needed for Chest pain  Sekou Barros MD   aluminum & magnesium hydroxide-simethicone (MAALOX REGULAR STRENGTH) 200-200-20 MG/5ML SUSP suspension Take 30 mLs by mouth every 6 hours as needed for Yesenia Etienne MD   mirtazapine (REMERON) 15 MG tablet Take 0.5 tablets by mouth nightly  Rachel Chiu MD   levothyroxine (SYNTHROID) 50 MCG tablet Take 1 tablet by mouth Daily  Rachel Chiu MD   donepezil (ARICEPT) 10 MG tablet Take 10 mg by mouth daily  Historical Provider, MD   simethicone (MYLICON) 677 MG chewable tablet Take 125 mg by mouth every 6 hours as needed for Flatulence  Historical Provider, MD   OXYGEN Inhale 2 L/min into the lungs nightly. Historical Provider, MD     Allergies   Allergen Reactions    Amlodipine Other (See Comments)     Pt says she got all the side effects      Sulfa Antibiotics     Cleocin [Clindamycin] Rash    Macrolides And Ketolides Rash     \"All mycins\"    Pcn [Penicillins] Rash    Tetracyclines & Related Rash    Zithromax [Azithromycin] Rash       Subjective:      Review of Systems   Constitutional: Negative for activity change, appetite change, chills, fatigue and fever. HENT: Positive for voice change. Respiratory: Positive for cough, chest tightness and shortness of breath. Negative for wheezing. Cardiovascular: Negative for chest pain, palpitations and leg swelling. Gastrointestinal: Negative for abdominal pain, constipation and diarrhea. Genitourinary: Negative for difficulty urinating. Skin: Negative for rash. Allergic/Immunologic: Positive for environmental allergies. Neurological: Negative for dizziness, syncope, weakness, light-headedness and headaches. Psychiatric/Behavioral: Positive for confusion and decreased concentration.  Negative for behavioral problems, dysphoric mood and sleep disturbance. The patient is nervous/anxious. Objective:     Physical Exam  Vitals signs and nursing note reviewed. Constitutional:       General: She is not in acute distress. Appearance: She is well-developed. Eyes:      Conjunctiva/sclera: Conjunctivae normal.   Neck:      Musculoskeletal: Normal range of motion and neck supple. Thyroid: No thyromegaly. Cardiovascular:      Rate and Rhythm: Normal rate and regular rhythm. Heart sounds: Normal heart sounds. No murmur. Pulmonary:      Effort: Pulmonary effort is normal. No respiratory distress. Breath sounds: Normal breath sounds. No wheezing. Abdominal:      General: Abdomen is flat. Bowel sounds are normal.      Palpations: Abdomen is soft. Tenderness: There is no abdominal tenderness. There is no guarding or rebound. Lymphadenopathy:      Cervical: No cervical adenopathy. Skin:     General: Skin is warm and dry. Findings: No erythema or rash. Neurological:      General: No focal deficit present. Mental Status: She is alert. Mental status is at baseline. Psychiatric:         Mood and Affect: Mood is anxious. Speech: Speech normal.         Behavior: Behavior normal.         Cognition and Memory: Memory is impaired. Assessment:       Diagnosis Orders   1. Mucopurulent chronic bronchitis ([address]     2. Paranoia ([address]     3. Essential hypertension     4. Late onset Alzheimer's disease without behavioral disturbance ([address]     5. Chronic pansinusitis               Plan:   1. COPD. Stable. Her chest tightness and cough is chronic. There is no significant change today compared to the last several years. I will just continue her current inhalers and continue to monitor at this time. I did remind her that she can use her duoneb more often if she needs it. 2. Paranoia. Stable.  She continues to be very worried about the air that she is breathing and she is genuinely anxious but she is comfortable at [facility] and there have not been any significant changes in her day-to-day routine So she has generally been pretty stable And has not had any panic attacks in the last several months. 3. Hypertension. Stable. Her blood pressures have been well controlled without any issues with hypertensive urgency or hypotension. I will continue her current medication dosages. 4. Alzheimer's dementia. Stable. She continues to be confused and quite forgetful but there have not been any significant changes in the last several months to years. I will continue to monitor her at this time. 5. Chronic sinusitis. Stable. She always complains of hoarse voice and sinus congestion. She actually seems do you think that the congestion Is slightly better today than it was the last time I saw her. I will have her continue the Flonase and nasal Saline as needed. Return in about 1 month (around 9/12/2020) for COPD follow up, Anxiety follow up. Patient given educational materials - see patient instructions. Discussed use, benefit, and side effects of prescribed medications. All patient questions answered. Patient voiced understanding. Reviewed health maintenance. Instructed to continue current medications, diet and exercise. Patient agreed with treatment plan. Follow up as directed. Vargas Love am personally transcribing for Brenda Galvez MD 8/14/20 at 1:00 PM EDT. Key Matias MD, personally performed the services described in this document as transcribed by the , and it is both accurate and complete.     Electronically signed by Brenda Galvez MD on 8/14/20 at 1:18 PM EDT

## 2020-09-16 ENCOUNTER — OUTSIDE SERVICES (OUTPATIENT)
Dept: FAMILY MEDICINE CLINIC | Age: 85
End: 2020-09-16
Payer: MEDICARE

## 2020-09-16 PROCEDURE — 99309 SBSQ NF CARE MODERATE MDM 30: CPT | Performed by: FAMILY MEDICINE

## 2020-09-21 ASSESSMENT — ENCOUNTER SYMPTOMS
CONSTIPATION: 0
WHEEZING: 0
COUGH: 1
SINUS PAIN: 1
CHEST TIGHTNESS: 1
ABDOMINAL PAIN: 0
SINUS PRESSURE: 1
SHORTNESS OF BREATH: 1
DIARRHEA: 0

## 2020-09-21 NOTE — PROGRESS NOTES
[facility] [address]  [address] [address]  Dept: [phone]  Dept Fax: [phone]  Loc: [phone]    Robbie López  is a 80 y.o. female who presents today for her medical conditions/complaints as noted below. Robbie López is c/o of     Chief Complaint   Patient presents with    COPD    Hypertension    Anxiety    Dementia       HPI:   Hardik Marie is being seen for her regular monthly follow up while at [facility]. Patient reports that she is doing okay overall today. She continues to complain of some shortness of breath, chest tightness, nonproductive cough, and some sinus congestion. This is her baseline status and she mention it every single time I see her. she claims that today it is humid and that's why she was having problems, but she truly says that every single time I see her. Nursing staff has not noticed any increased issues with her breathing and she typically only uses her nebulizer once or twice a day. She does get relief from her nebulizer, so I reminded her to continue to ask for it as needed. She is not reporting any chest pain just a little bit of chest tightness again from her chronic cough in COPD. She does report that she has a terrible headache today but again that's pretty normal for her and she reports that it is mostly in the sinuses. I told her to ask for Tylenol as needed. She also was concerned that she felt feverish but was reassured when I told her that her temperature had just recently been taken and that it was normal. She denies any issues with abdominal pain. No constipation or diarrhea. She is not having any urinary symptoms that are concerning to her she does not have any swelling in her legs and she generally has been doing pretty well overall. She continues to get out of her room in socialize with other residents on a regular basis.  She has not showed any new issues with memory or behavior disorders. She has not had any wandering behaviors either. 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 TC, RRT 4/20/18   Substance Use Topics    Alcohol use: No     Alcohol/week: 0.0 standard drinks      Prior to Visit Medications    Medication Sig Taking?  Authorizing Provider   acetaminophen (TYLENOL) 325 MG tablet Take 650 mg by mouth 2 times daily  Historical Provider, MD   albuterol (PROVENTIL) (2.5 MG/3ML) 0.083% nebulizer solution Take 2.5 mg by nebulization 2 times daily  Historical Provider, MD   acetaminophen (TYLENOL) 500 MG tablet Take 500 mg by mouth every 4 hours as needed for Pain or Fever  Historical Provider, MD   benzonatate (TESSALON) 200 MG capsule Take 200 mg by mouth 3 times daily as needed for Cough  Historical Provider, MD   guaiFENesin (ROBITUSSIN) 100 MG/5ML syrup Take 200 mg by mouth 4 times daily as needed for Cough or Congestion  Historical Provider, MD   PARoxetine (PAXIL) 10 MG tablet Take 10 mg by mouth nightly  Historical Provider, MD   busPIRone (BUSPAR) 10 MG tablet Take 2 tablets by mouth 3 times daily  Patient taking differently: Take 7.5 mg by mouth nightly 10mg in morning, 7.5mg at night  Sheryl Finch MD   lisinopril (PRINIVIL;ZESTRIL) 5 MG tablet Take 1 tablet by mouth daily  Maxine Soto   metoprolol tartrate (LOPRESSOR) 25 MG tablet Take 1 tablet by mouth 2 times daily  Natividad Tesfaye   bethanechol (URECHOLINE) 25 MG tablet Take 1 tablet by mouth 4 times daily  Maxine Soto   Multiple Vitamin (MULTI VITAMIN DAILY) TABS Take 1 tablet by mouth daily Take 1 tab po daily  Irais Beckford MD   fluticasone (FLONASE) 50 MCG/ACT nasal spray 1 spray by Nasal route daily  Irais Beckford MD   hydrochlorothiazide (MICROZIDE) 12.5 MG capsule take 1 capsule by mouth once daily  Irais Beckford MD   Calcium Carbonate (CALCIUM 600 PO) Take 600 mg by mouth 2 times daily  Historical Provider, MD   loratadine (CLARITIN) 10 MG capsule Take 1 capsule by mouth daily  Irais Beckford MD   isosorbide mononitrate (IMDUR) 30 MG extended release tablet Take 1 tablet by mouth daily  Irais Beckford MD   potassium chloride (KLOR-CON M) 20 MEQ extended release tablet Take 1 tablet by mouth 2 times daily  Irais Beckford MD   aspirin 81 MG chewable tablet Take 1 tablet by mouth daily  Irais Beckford MD   budesonide-formoterol (SYMBICORT) 80-4.5 MCG/ACT AERO Inhale 2 puffs into the lungs 2 times daily  Donna Adams MD   Cholecalciferol (VITAMIN D) 2000 UNITS CAPS capsule Take 4,000 Units by mouth daily  Historical Provider, MD   nitroGLYCERIN (NITROSTAT) 0.4 MG SL tablet Place 1 tablet under the tongue every 5 minutes as needed for Chest pain  Lauren Coughlin MD   aluminum & magnesium hydroxide-simethicone (MAALOX REGULAR STRENGTH) 200-200-20 MG/5ML SUSP suspension Take 30 mLs by mouth every 6 hours as needed for Sabino MD Roly   mirtazapine (REMERON) 15 MG tablet Take 0.5 tablets by mouth nightly  Irais Beckford MD   levothyroxine (SYNTHROID) 50 MCG tablet Take 1 tablet by mouth Daily  Irais Beckford MD   donepezil (ARICEPT) 10 MG tablet Take 10 mg by mouth daily  Historical Provider, MD   simethicone (MYLICON) 407 MG chewable tablet Take 125 mg by mouth every 6 hours as needed for Flatulence  Historical Provider, MD   OXYGEN Inhale 2 L/min into the lungs nightly. Historical Provider, MD     Allergies   Allergen Reactions    Amlodipine Other (See Comments)     Pt says she got all the side effects      Sulfa Antibiotics     Cleocin [Clindamycin] Rash    Macrolides And Ketolides Rash     \"All mycins\"    Pcn [Penicillins] Rash    Tetracyclines & Related Rash    Zithromax [Azithromycin] Rash       Subjective:      Review of Systems   Constitutional: Negative for activity change, appetite change, chills, fatigue and fever. HENT: Positive for sinus pressure and sinus pain. Respiratory: Positive for cough, chest tightness and shortness of breath. Negative for wheezing. Cardiovascular: Negative for chest pain, palpitations and leg swelling. Gastrointestinal: Negative for abdominal pain, constipation and diarrhea. Genitourinary: Negative for difficulty urinating. Skin: Negative for rash. Neurological: Positive for headaches. Negative for dizziness, syncope, weakness and light-headedness. Psychiatric/Behavioral: Positive for confusion and decreased concentration. Negative for behavioral problems, dysphoric mood and sleep disturbance. The patient is not nervous/anxious. Objective:     Physical Exam  Vitals signs and nursing note reviewed. Constitutional:       General: She is not in acute distress. Appearance: She is well-developed. Eyes:      Conjunctiva/sclera: Conjunctivae normal.   Neck:      Musculoskeletal: Normal range of motion and neck supple. Thyroid: No thyromegaly. Cardiovascular:      Rate and Rhythm: Normal rate and regular rhythm. Heart sounds: Normal heart sounds. No murmur. Pulmonary:      Effort: Pulmonary effort is normal. No respiratory distress.       Breath sounds: Normal breath sounds. No wheezing. Abdominal:      General: Abdomen is flat. Bowel sounds are normal.      Palpations: Abdomen is soft. Tenderness: There is no abdominal tenderness. There is no guarding or rebound. Lymphadenopathy:      Cervical: No cervical adenopathy. Skin:     General: Skin is warm and dry. Findings: No erythema or rash. Neurological:      General: No focal deficit present. Mental Status: She is alert. Mental status is at baseline. Psychiatric:         Mood and Affect: Mood normal.         Speech: Speech normal.         Behavior: Behavior normal.         Cognition and Memory: Memory is impaired. Assessment:       Diagnosis Orders   1. Mucopurulent chronic bronchitis ([facility] [address]     2. Paranoia ([facility] [address]     3. Essential hypertension     4. Late onset Alzheimer's disease without behavioral disturbance ([facility] [address]               Plan:   1. COPD. Stable. She continues to have some chest tightness and just general shortness of breath but that is her baseline. She has not had any problems with low SPO2 levels. I will continue her on her inhalers and continue to monitor. 2. Paranoia. Stable. She continues to be worried that the air that she is breathing isn't perfectly adequate for her but overall her anxiety has pretty much been the same as it has been for the last several years. 3. Hypertension . Stable. Her blood pressures have been well controlled. Nursing staff has not had any concerns about it at all. 4. Dementia. Stable. She has not had any issues with recent behavioral changes no wandering and generally she has been about the same for the last year or so. I will continue to monitor. Return in about 1 month (around 10/16/2020) for Anxiety follow up, COPD follow up, HTN follow up. Patient given educational materials - see patient instructions. Discussed use, benefit, and side effects of prescribed medications. All patient questions answered.  Patient voiced understanding. Reviewed health maintenance. Instructed to continue current medications, diet and exercise. Patient agreed with treatment plan. Follow up as directed. Radha Jaffe MD, personally performed the services described in this document as transcribed by the , and it is both accurate and complete.     Electronically signed by Lisha Eng MD on 9/21/20 at 10:44 AM EDT

## 2020-10-07 ENCOUNTER — OUTSIDE SERVICES (OUTPATIENT)
Dept: FAMILY MEDICINE CLINIC | Age: 85
End: 2020-10-07
Payer: MEDICARE

## 2020-10-07 PROCEDURE — 99309 SBSQ NF CARE MODERATE MDM 30: CPT | Performed by: FAMILY MEDICINE

## 2020-10-09 ASSESSMENT — ENCOUNTER SYMPTOMS
COUGH: 0
SHORTNESS OF BREATH: 1
ABDOMINAL PAIN: 0
RHINORRHEA: 1
WHEEZING: 0
CONSTIPATION: 0
DIARRHEA: 0
SINUS PRESSURE: 1
CHEST TIGHTNESS: 1

## 2020-10-09 NOTE — PROGRESS NOTES
[facility]  [address] [address]  Dept: [phone]  Dept Fax: [phone]  Loc: [phone]    Radha Mcdonnell  is a 80 y.o. female who presents today for her medical conditions/complaints as noted below. Radha Mcdonnell is c/o of     Chief Complaint   Patient presents with    Cough    Anxiety    Hypertension    Dementia       HPI:   Carlton Tapia is being seen for her regular monthly follow up while at [facility]. Patient reports that she is not feeling great today. Her lungs feel very tight and she has been coughing a little bit more than normal. She also reports that she has quite a bit of sinus congestion and a runny nose. This is really her baseline but every time I ask her, she tells me she is not feeling well. She also reports that her voice is more hoarse than normal although I think it sounds about the same as it has for the last four years. Otherwise though she reports that she is doing fine and when I see her walking around, she does not appear to be short of breath and can walk around the facility quite quickly without any difficulties. She also only coughs when I am listening to her lungs and otherwise can talk to me in normal sentences without coughing or bringing up any phlegm. She does report that she has a headache which she does mention is chronic for her but otherwise no chest pains. Her appetite has been good. She has not noticed any swelling in her legs. Although she does report that she is generally fatigued, and her joints have been a little bit more achy with all the weather changes recently.      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 TC, RRT 4/20/18   Substance Use Topics    Alcohol use: No     Alcohol/week: 0.0 standard drinks      Prior to Visit Medications    Medication Sig Taking?  Authorizing Provider   acetaminophen (TYLENOL) 325 MG tablet Take 650 mg by mouth 2 times daily  Historical Provider, MD   albuterol (PROVENTIL) (2.5 MG/3ML) 0.083% nebulizer solution Take 2.5 mg by nebulization 2 times daily  Historical Provider, MD   acetaminophen (TYLENOL) 500 MG tablet Take 500 mg by mouth every 4 hours as needed for Pain or Fever  Historical Provider, MD   benzonatate (TESSALON) 200 MG capsule Take 200 mg by mouth 3 times daily as needed for Cough  Historical Provider, MD   guaiFENesin (ROBITUSSIN) 100 MG/5ML syrup Take 200 mg by mouth 4 times daily as needed for Cough or Congestion  Historical Provider, MD   PARoxetine (PAXIL) 10 MG tablet Take 10 mg by mouth nightly  Historical Provider, MD   busPIRone (BUSPAR) 10 MG tablet Take 2 tablets by mouth 3 times daily  Patient taking differently: Take 7.5 mg by mouth nightly 10mg in morning, 7.5mg at night  Hattie Marcos MD   lisinopril (PRINIVIL;ZESTRIL) 5 MG tablet Take 1 tablet by mouth daily  Tiana Montalvo   metoprolol tartrate (LOPRESSOR) 25 MG tablet Take 1 tablet by mouth 2 times daily  Callie Tesfaye   bethanechol (URECHOLINE) 25 MG tablet Take 1 tablet by mouth 4 times daily  Tiana Montalvo   Multiple Vitamin (MULTI VITAMIN DAILY) TABS Take 1 tablet by mouth daily Take 1 tab po daily  John Will MD   fluticasone (FLONASE) 50 MCG/ACT nasal spray 1 spray by Nasal route daily  John Will MD   hydrochlorothiazide (MICROZIDE) 12.5 MG capsule take 1 capsule by mouth once daily  John Will MD   Calcium Carbonate (CALCIUM 600 PO) Take 600 mg by mouth 2 times daily  Historical Provider, MD   loratadine (CLARITIN) 10 MG capsule Take 1 capsule by mouth daily  John Will MD   isosorbide mononitrate (IMDUR) 30 MG extended release tablet Take 1 tablet by mouth daily  John Will MD   potassium chloride (KLOR-CON M) 20 MEQ extended release tablet Take 1 tablet by mouth 2 times daily  John Will MD   aspirin 81 MG chewable tablet Take 1 tablet by mouth daily  John Will MD   budesonide-formoterol (SYMBICORT) 80-4.5 MCG/ACT AERO Inhale 2 puffs into the lungs 2 times daily  Jose Mcbride MD   Cholecalciferol (VITAMIN D) 2000 UNITS CAPS capsule Take 4,000 Units by mouth daily  Historical Provider, MD   nitroGLYCERIN (NITROSTAT) 0.4 MG SL tablet Place 1 tablet under the tongue every 5 minutes as needed for Chest pain  Clearence MD Filiberto   aluminum & magnesium hydroxide-simethicone (MAALOX REGULAR STRENGTH) 200-200-20 MG/5ML SUSP suspension Take 30 mLs by mouth every 6 hours as needed for Rusherry David MD   mirtazapine (REMERON) 15 MG tablet Take 0.5 tablets by mouth nightly  John Will MD   levothyroxine (SYNTHROID) 50 MCG tablet Take 1 tablet by mouth Daily  John Will MD   donepezil (ARICEPT) 10 MG tablet Take 10 mg by mouth daily  Historical Provider, MD   simethicone (MYLICON) 312 MG chewable tablet Take 125 mg by mouth every 6 hours as needed for Flatulence  Historical Provider, MD   OXYGEN Inhale 2 L/min into the lungs nightly.   Historical Provider, MD     Allergies   Allergen Reactions    Amlodipine Other (See Comments)     Pt says she got all the side effects      Sulfa Antibiotics     Cleocin [Clindamycin] Rash    Macrolides And Ketolides Rash     \"All mycins\"    Pcn [Penicillins] Rash    Tetracyclines & Related Rash    Zithromax [Azithromycin] Rash       Subjective:      Review of Systems   Constitutional: Negative for activity change, appetite change, chills, fatigue and fever. HENT: Positive for rhinorrhea and sinus pressure. Respiratory: Positive for chest tightness and shortness of breath. Negative for cough and wheezing. Cardiovascular: Negative for chest pain, palpitations and leg swelling. Gastrointestinal: Negative for abdominal pain, constipation and diarrhea. Genitourinary: Negative for difficulty urinating. Skin: Negative for rash. Neurological: Positive for headaches. Negative for dizziness, syncope, weakness and light-headedness. Psychiatric/Behavioral: Positive for confusion and decreased concentration. Negative for behavioral problems, dysphoric mood and sleep disturbance. The patient is nervous/anxious. Objective:     Physical Exam  Vitals signs and nursing note reviewed. Constitutional:       General: She is not in acute distress. Appearance: She is well-developed. Eyes:      Conjunctiva/sclera: Conjunctivae normal.   Neck:      Musculoskeletal: Normal range of motion and neck supple. Thyroid: No thyromegaly. Cardiovascular:      Rate and Rhythm: Normal rate and regular rhythm. Heart sounds: Normal heart sounds. No murmur. Pulmonary:      Effort: Pulmonary effort is normal. No respiratory distress. Breath sounds: Normal breath sounds. No wheezing. Abdominal:      General: Abdomen is flat. Bowel sounds are normal.      Palpations: Abdomen is soft. Tenderness: There is no abdominal tenderness. There is no guarding or rebound. Lymphadenopathy:      Cervical: No cervical adenopathy. Skin:     General: Skin is warm and dry. Findings: No erythema or rash.    Neurological: General: No focal deficit present. Mental Status: She is alert. Mental status is at baseline. Psychiatric:         Mood and Affect: Mood is anxious. Speech: Speech normal.         Behavior: Behavior normal.         Cognition and Memory: Memory is impaired. Assessment:       Diagnosis Orders   1. Mucopurulent chronic bronchitis ([facility] [address]     2. Paranoia ([facility] [address]     3. Essential hypertension     4. Late onset Alzheimer's disease without behavioral disturbance ([facility] [address]               Plan:   1. COPD. Stable. She does not currently appear to be in exacerbation. Her lungs are diminished in sound, but nothing is different than her lung exam then I have listened to for the past several years. There is no wheezing and she is not bringing up any phlegm. I told her to continue using her nebulizer as needed and I reminded her that she does have Mucinex available if she really needs it. 2. Paranoia. Stable. She continues to be quite anxious on a general basis but overall has not had any issues with getting overly worked up or getting agitated. I will continue to monitor. 3. Hypertension. Stable. No recent issues with blood pressure and she is generally feeling well. I will continue her current medications. 4. Dementia. Stable period she has not had any issues with behaviors. No significant worsening of her memory or her mental status. I will continue to have her stay and regular unit and no need to consider putting her back in the memory unit at this time. Return in about 1 month (around 11/7/2020) for HTN follow up, Anxiety follow up, COPD follow up. Patient given educational materials - see patient instructions. Discussed use, benefit, and side effects of prescribed medications. All patient questions answered. Patient voiced understanding. Reviewed health maintenance. Instructed to continue current medications, diet and exercise. Patient agreed with treatment plan. Follow up as directed. Rossana Carrera am personally transcribing for Marcela Diaz MD 10/9/20 at 8:58 AM EDT. Sriram Boyle MD, personally performed the services described in this document as transcribed by the , and it is both accurate and complete.     Electronically signed by Marcela Diaz MD on 10/9/20 at 11:07 AM EDT

## 2020-10-13 ENCOUNTER — TELEPHONE (OUTPATIENT)
Dept: FAMILY MEDICINE CLINIC | Age: 85
End: 2020-10-13

## 2020-10-15 ENCOUNTER — TELEPHONE (OUTPATIENT)
Dept: FAMILY MEDICINE CLINIC | Age: 85
End: 2020-10-15

## 2020-11-05 ENCOUNTER — TELEPHONE (OUTPATIENT)
Dept: FAMILY MEDICINE CLINIC | Age: 85
End: 2020-11-05

## 2020-11-05 NOTE — TELEPHONE ENCOUNTER
Jodie Watts at [facility] stating they got an order for pt for dexamethazone and pharmacy cannot get it and they are questioning if they can substitute tabs, please advise at [phone]

## 2020-11-10 ENCOUNTER — OUTSIDE SERVICES (OUTPATIENT)
Dept: PRIMARY CARE CLINIC | Age: 85
End: 2020-11-10
Payer: MEDICARE

## 2020-11-10 PROCEDURE — 99309 SBSQ NF CARE MODERATE MDM 30: CPT | Performed by: FAMILY MEDICINE

## 2020-11-16 ASSESSMENT — ENCOUNTER SYMPTOMS
DIARRHEA: 0
CONSTIPATION: 0
COUGH: 1
CHEST TIGHTNESS: 1
ABDOMINAL PAIN: 0
SHORTNESS OF BREATH: 0
WHEEZING: 0

## 2020-11-16 NOTE — PROGRESS NOTES
[facility] [address] [facility] [facility]  [facility] [place]  [place] [address]  Dept: [phone]  Dept Fax: [phone]  Loc: [phone]    Himanshu Solomon  is a 80 y.o. female who presents today for her medical conditions/complaints as noted below. Himanshu Solomon is c/o of     Chief Complaint   Patient presents with    Concern For COVID-19    COPD    Anxiety    Hypertension       HPI:   Elio Breaux is being seen for her regular monthly follow up while at [facility]. She is currently in the COVID unit at [facility] because she tested positive for COVID-19 about a week or so ago. She is doing okay although she has been significantly more short of breath than normal and her oxygen saturation were dipping down into the upper 80s around 88 or 89, thus she is currently on 3 liters of oxygen continuously. Previously she only needed oxygen in the evenings when she was sleeping but she has been using oxygen the entire time that she has been positive for COVID. When I saw her today though she reports that she is feeling really good. She said that this is how she normally feels, and she did not seem to think that she was much worse with the virus then she is on any other day. She was coughing and bringing up some sputum but again that is not that far from her baseline. She did report that she feels that she has a lot of mucus in her throat but again she tells me about that almost every single time I see her. She was in really good spirits and she reports that she is not having any GI the stress from the [employer] and that she has been eating quite well. She does not seem any more confused than normal to me and was able to carry on quite the conversation and despite my outfit being completely in PPE from head to toe she know exactly who I was as soon as I walked into the room which I was pretty impressed by.  She asked me about my daughter almost immediately, so her memory seems to be about baseline. When I found out she was positive for COVID-19 and that she was requiring oxygen I had started her on Decadron which was about 6 days ago and she is going to complete a 10-day course of the Decadron, and so far it does seem to be helping her. Nursing staff has not unnoticed a significant amount of wheezing and the only thing concerning is labs. I checked labs when I started the Decadron and her hemoglobin at that time was normal at 12.8. Labs were rechecked again for some reason two days ago and her hemoglobin was reported at 8.4 which is a significant drop in quite concerning to me. Thus, I am going to recheck labs tomorrow to make sure that the hemoglobin is not trending down at a rapid rate and hopefully to see that it has improved. Otherwise I was impressed that Aurea Sandifer did not seem more anxious than normal, because she tends to get worked up about things, but she seemed completely unfazed by having a new room or the diagnosis of COVID-19. She did seem to be aware that she had COVID which is not the case for all the other patients that I saw in the unit today.      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 Gastrointestinal: Negative for abdominal pain, constipation and diarrhea. Genitourinary: Negative for difficulty urinating. Skin: Negative for rash. Neurological: Positive for weakness. Negative for dizziness, syncope, light-headedness and headaches. Psychiatric/Behavioral: Positive for confusion and decreased concentration. Negative for behavioral problems, dysphoric mood and sleep disturbance. The patient is not nervous/anxious. Objective:     Physical Exam  Vitals signs and nursing note reviewed. Constitutional:       General: She is not in acute distress. Appearance: She is well-developed. Eyes:      Conjunctiva/sclera: Conjunctivae normal.   Neck:      Musculoskeletal: Normal range of motion and neck supple. Thyroid: No thyromegaly. Cardiovascular:      Rate and Rhythm: Normal rate and regular rhythm. Heart sounds: Normal heart sounds. No murmur. Pulmonary:      Effort: Pulmonary effort is normal. No respiratory distress. Breath sounds: Normal breath sounds. No wheezing. Abdominal:      General: Abdomen is flat. Bowel sounds are normal.      Palpations: Abdomen is soft. Tenderness: There is no abdominal tenderness. There is no guarding or rebound. Lymphadenopathy:      Cervical: No cervical adenopathy. Skin:     General: Skin is warm and dry. Findings: No erythema or rash. Neurological:      General: No focal deficit present. Mental Status: She is alert. Mental status is at baseline. Psychiatric:         Mood and Affect: Mood normal.         Speech: Speech normal.         Behavior: Behavior normal.         Cognition and Memory: Memory is impaired. Assessment:       Diagnosis Orders   1. COVID-19 virus infection     2. Mucopurulent chronic bronchitis ([facility]     3. Paranoia ([facility]     4. Essential hypertension     5.  Late onset Alzheimer's disease without behavioral disturbance ([facility]     6. Iron deficiency anemia, unspecified iron deficiency anemia type               Plan:   1. COVID-19. New. She was diagnosed about one week ago and has been on Decadron now for about 6 days. She seems to be doing fairly well clinically although she is on 3 liters of oxygen she is in good spirits and her lungs sound very clear. Overall, I think she is doing really well so I will continue her on the Decadron and continue her on the oxygen until she can be weaned off of that. Otherwise I am very happy with how she is doing clinically. 2. COPD. Stable. At this point her COPD is about the same as always. She does not appear to be in a significant COPD exacerbation and the Decadron does seem to be helping to keep her from wheezing. 3. Paranoia. Stable. She really does not seem to be overly anxious at this point which I am quite shocked by but very glad that she is feeling relaxed. I will continue to monitor. 4. Hypertension. Stable. Her blood pressure has been well controlled. Since she does not appear to be getting dehydrated at all I will continue her current dose of medications. 5. Dementia. Stable. She really is quite at baseline and does not seem to be more confused due to having COVID. I will continue to monitor. 6. Anemia. New. Unclear of why this is happening she does not appear to be bleeding anywhere. I will check a hemoglobin tomorrow to make sure that that improves. If her hemoglobin continues to drop down into the 6-7 range, I will have her taken to the ER for a likely blood transfusion. Return in about 1 month (around 12/10/2020) for COPD follow up. Patient given educational materials - see patient instructions. Discussed use, benefit, and side effects of prescribed medications. All patient questions answered. Patient voiced understanding. Reviewed health maintenance. Instructed to continue current medications, diet and exercise. Patient agreed with treatment plan.  Follow up as directed. Victoriano Arcos am personally transcribing for Yves Herron MD 11/16/20 at 2:18 PM EST. Ness Gardner MD, personally performed the services described in this document as transcribed by the , and it is both accurate and complete.     Electronically signed by Yves Herron MD on 11/16/20 at 3:07 PM EST

## 2020-12-07 ENCOUNTER — OUTSIDE SERVICES (OUTPATIENT)
Dept: PRIMARY CARE CLINIC | Age: 85
End: 2020-12-07
Payer: MEDICARE

## 2020-12-07 PROCEDURE — 99309 SBSQ NF CARE MODERATE MDM 30: CPT | Performed by: FAMILY MEDICINE

## 2020-12-08 ENCOUNTER — TELEPHONE (OUTPATIENT)
Dept: FAMILY MEDICINE CLINIC | Age: 85
End: 2020-12-08

## 2020-12-14 ASSESSMENT — ENCOUNTER SYMPTOMS
WHEEZING: 0
ABDOMINAL PAIN: 0
CHEST TIGHTNESS: 0
CONSTIPATION: 0
COUGH: 0
SHORTNESS OF BREATH: 1
DIARRHEA: 0

## 2020-12-14 NOTE — PROGRESS NOTES
[address] [place] [address]  Dept: [phone]  Dept Fax: [phone]  Loc: [phone]    Diana Martinez  is a 80 y.o. female who presents today for her medical conditions/complaints as noted below. Diana Martinez is c/o of     Chief Complaint   Patient presents with    COPD    Hypertension    Anxiety    Sinusitis       HPI:   Pearlie Goldberg is being seen for her regular monthly follow up while at [facility]. She is doing remarkably well overall. Last month she had COVID and was requiring oxygen supplementations throughout the day by at this point she is off of oxygen and only uses at night which was her normal prior to having COVID. She looks fantastic today. She reports that she is really not coughing at all she is still short of breath but that was her baseline prior to getting COVID and she actually mentioned her shortness of breath a lot less this month than she has in the past. I do not know if she just did not want to talk about it today or if she is really not feeling as short of breath as she has been previously. She reports that she is still a little bit tired and seems to get worn out more easily when walking around the room, than she did prior to having COVID. Overall, she reports that she is feeling really good. She still thinks that her nebulizer really helps her when she gets short of breath and she is still using that at least twice a day. She denies any problems with GI symptoms. No nausea, vomiting, or diarrhea. She reports that she is a little bit achy and she mentioned that her knees hurt her sometimes, which is actually new she has never mentioned that before although I highly suspect that she has had some arthritis before, although it seems to be a little bit worse since having COVID. She also reports that her anxiety seems to be fine and she does not remember being anxious which I found somewhat funny, but she reports that she does not remember being anxious and generally she feels like she is doing pretty well. She does seem to be a little bit more confused today. She repeated herself quite frequently and she kept referring to before when I was talking to her about when she had COVID. She also seems to think that when she had COVID she was at home rather than just in a different part of the nursing home.  She tends to get extra confused when her room is changed because that has happened before when she was in the hospital and she does not do well with change. I suspect that she is more acutely confused now just because she has only recently then back in her room after having COVID and she is still readjusting to that. Also, her best friend in the building is currently sick with COVID so she is in the COVID unit which is different for [place] as well because then her friend is not somewhere that she can see her all the time and so she is struggling with that. 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 TC, RRT 4/20/18   Substance Use Topics    Alcohol use: No     Alcohol/week: 0.0 standard drinks      Prior to Visit Medications    Medication Sig Taking?  Authorizing Provider   acetaminophen (TYLENOL) 325 MG tablet Take 650 mg by mouth 2 times daily  Historical Provider, MD   albuterol (PROVENTIL) (2.5 MG/3ML) 0.083% nebulizer solution Take 2.5 mg by nebulization 2 times daily  Historical Provider, MD   acetaminophen (TYLENOL) 500 MG tablet Take 500 mg by mouth every 4 hours as needed for Pain or Fever  Historical Provider, MD benzonatate (TESSALON) 200 MG capsule Take 200 mg by mouth 3 times daily as needed for Cough  Historical Provider, MD   guaiFENesin (ROBITUSSIN) 100 MG/5ML syrup Take 200 mg by mouth 4 times daily as needed for Cough or Congestion  Historical Provider, MD   PARoxetine (PAXIL) 10 MG tablet Take 10 mg by mouth nightly  Historical Provider, MD   busPIRone (BUSPAR) 10 MG tablet Take 2 tablets by mouth 3 times daily  Patient taking differently: Take 7.5 mg by mouth nightly 10mg in morning, 7.5mg at night  Nicki Arnold MD   lisinopril (PRINIVIL;ZESTRIL) 5 MG tablet Take 1 tablet by mouth daily  Metkareen Abbott   metoprolol tartrate (LOPRESSOR) 25 MG tablet Take 1 tablet by mouth 2 times daily  Leola Tesfaye   bethanechol (URECHOLINE) 25 MG tablet Take 1 tablet by mouth 4 times daily  Metta Hoyos   Multiple Vitamin (MULTI VITAMIN DAILY) TABS Take 1 tablet by mouth daily Take 1 tab po daily  Damir Huggins MD   fluticasone (FLONASE) 50 MCG/ACT nasal spray 1 spray by Nasal route daily  Damir Huggins MD   hydrochlorothiazide (MICROZIDE) 12.5 MG capsule take 1 capsule by mouth once daily  Damir Huggins MD   Calcium Carbonate (CALCIUM 600 PO) Take 600 mg by mouth 2 times daily  Historical Provider, MD   loratadine (CLARITIN) 10 MG capsule Take 1 capsule by mouth daily  Damir Huggins MD   isosorbide mononitrate (IMDUR) 30 MG extended release tablet Take 1 tablet by mouth daily  Damir Huggins MD   potassium chloride (KLOR-CON M) 20 MEQ extended release tablet Take 1 tablet by mouth 2 times daily  Damir Huggins MD   aspirin 81 MG chewable tablet Take 1 tablet by mouth daily  Damir Huggins MD   budesonide-formoterol (SYMBICORT) 80-4.5 MCG/ACT AERO Inhale 2 puffs into the lungs 2 times daily  Masoud Chacko MD   Cholecalciferol (VITAMIN D) 2000 UNITS CAPS capsule Take 4,000 Units by mouth daily  Historical Provider, MD nitroGLYCERIN (NITROSTAT) 0.4 MG SL tablet Place 1 tablet under the tongue every 5 minutes as needed for Chest pain  Guy David MD   aluminum & magnesium hydroxide-simethicone (MAALOX REGULAR STRENGTH) 200-200-20 MG/5ML SUSP suspension Take 30 mLs by mouth every 6 hours as needed for Matthew Santiago MD   mirtazapine (REMERON) 15 MG tablet Take 0.5 tablets by mouth nightly  Leanne Velasquez MD   levothyroxine (SYNTHROID) 50 MCG tablet Take 1 tablet by mouth Daily  Leanne Velasquez MD   donepezil (ARICEPT) 10 MG tablet Take 10 mg by mouth daily  Historical Provider, MD   simethicone (MYLICON) 301 MG chewable tablet Take 125 mg by mouth every 6 hours as needed for Flatulence  Historical Provider, MD   OXYGEN Inhale 2 L/min into the lungs nightly. Historical Provider, MD     Allergies   Allergen Reactions    Amlodipine Other (See Comments)     Pt says she got all the side effects      Sulfa Antibiotics     Cleocin [Clindamycin] Rash    Macrolides And Ketolides Rash     \"All mycins\"    Pcn [Penicillins] Rash    Tetracyclines & Related Rash    Zithromax [Azithromycin] Rash       Subjective:      Review of Systems   Constitutional: Negative for activity change, appetite change, chills, fatigue and fever. Respiratory: Positive for shortness of breath (mild). Negative for cough, chest tightness and wheezing. Cardiovascular: Negative for chest pain, palpitations and leg swelling. Gastrointestinal: Negative for abdominal pain, constipation and diarrhea. Genitourinary: Negative for difficulty urinating. Skin: Negative for rash. Neurological: Negative for dizziness, syncope, weakness, light-headedness and headaches. Psychiatric/Behavioral: Positive for confusion and decreased concentration. Negative for behavioral problems, dysphoric mood and sleep disturbance. The patient is not nervous/anxious. Objective:     Physical Exam  Vitals signs and nursing note reviewed. Constitutional:       General: She is not in acute distress. Appearance: She is well-developed. Eyes:      Conjunctiva/sclera: Conjunctivae normal.   Neck:      Musculoskeletal: Normal range of motion and neck supple. Thyroid: No thyromegaly. Cardiovascular:      Rate and Rhythm: Normal rate and regular rhythm. Heart sounds: Normal heart sounds. No murmur. Pulmonary:      Effort: Pulmonary effort is normal. No respiratory distress. Breath sounds: Normal breath sounds. No wheezing. Abdominal:      General: Abdomen is flat. Bowel sounds are normal.      Palpations: Abdomen is soft. Tenderness: There is no abdominal tenderness. There is no guarding or rebound. Musculoskeletal:      Right lower le+ Pitting Edema present. Left lower le+ Pitting Edema present. Lymphadenopathy:      Cervical: No cervical adenopathy. Skin:     General: Skin is warm and dry. Findings: No erythema or rash. Neurological:      General: No focal deficit present. Mental Status: She is alert. Mental status is at baseline. Psychiatric:         Mood and Affect: Mood normal.         Speech: Speech normal.         Behavior: Behavior normal.         Cognition and Memory: Memory is impaired. Assessment:       Diagnosis Orders   1. Mucopurulent chronic bronchitis ([address]     2. Paranoia ([address]     3. Essential hypertension     4. Late onset Alzheimer's disease without behavioral disturbance ([address]     5. Chronic pansinusitis               Plan:   1. COPD. Stable. She is doing really well post COVID with her COPD. I am very impressed with how well she has recovered. I will continue her on her current treatments, but if she were to seem to be doing a little bit worse in the future, I would consider switching her steroid inhaler to Trelegy or Breztri. 2. Paranoia. Improving. She seems to be doing pretty well overall as far as her anxiety is concerned. She told me that she does not think that she has ever been anxious which I thought was funny because she tends to be very worked up about a lot of things but I was glad to see that she is not feeling particularly anxious this month despite all the changes an being on steroids for 10 days which can certainly cause acute anxiety as well. 3. Hypertension. Stable. Her blood pressure has been very well controlled through all of this. I will continue her current medications. 4. Dementia. Slightly worse. She is not doing very well with the room changes that she has had recently but I am sure that within the next few weeks once she has been back in her room for a little while and once her friend comes off of the COVID unit, I anticipate that she will be doing quite a bit better. 5. Chronic sinusitis. Stable. She still complains of sinus congestion and pressure but nothing worse than normal and she actually was not overly bothered by it today. I do not think she would need any antibiotics at this point. Return in about 1 month (around 1/7/2021) for COPD follow up. Patient given educational materials - see patient instructions. Discussed use, benefit, and side effects of prescribed medications. All patient questions answered. Patient voiced understanding. Reviewed health maintenance. Instructed to continue current medications, diet and exercise. Patient agreed with treatment plan. Follow up as directed. Luis Hernandez am personally transcribing for Cassidy Pereira MD 12/14/20 at 3:50 PM EST. Sameer Leyva MD, personally performed the services described in this document as transcribed by the , and it is both accurate and complete.     Electronically signed by Cassidy Pereira MD on 12/15/20 at 1:14 PM EST

## 2021-01-06 ENCOUNTER — TELEPHONE (OUTPATIENT)
Dept: FAMILY MEDICINE CLINIC | Age: 86
End: 2021-01-06

## 2021-01-20 ENCOUNTER — OUTSIDE SERVICES (OUTPATIENT)
Dept: FAMILY MEDICINE CLINIC | Age: 86
End: 2021-01-20
Payer: MEDICARE

## 2021-01-20 DIAGNOSIS — J41.1 MUCOPURULENT CHRONIC BRONCHITIS (HCC): Primary | ICD-10-CM

## 2021-01-20 DIAGNOSIS — G30.1 LATE ONSET ALZHEIMER'S DISEASE WITHOUT BEHAVIORAL DISTURBANCE (HCC): ICD-10-CM

## 2021-01-20 DIAGNOSIS — F02.80 LATE ONSET ALZHEIMER'S DISEASE WITHOUT BEHAVIORAL DISTURBANCE (HCC): ICD-10-CM

## 2021-01-20 DIAGNOSIS — I10 ESSENTIAL HYPERTENSION: ICD-10-CM

## 2021-01-20 DIAGNOSIS — F22 PARANOIA (HCC): ICD-10-CM

## 2021-01-20 PROCEDURE — 99309 SBSQ NF CARE MODERATE MDM 30: CPT | Performed by: FAMILY MEDICINE

## 2021-01-21 ASSESSMENT — ENCOUNTER SYMPTOMS
WHEEZING: 0
CONSTIPATION: 0
DIARRHEA: 0
COUGH: 0
ABDOMINAL PAIN: 0
CHEST TIGHTNESS: 0
SHORTNESS OF BREATH: 1

## 2021-01-21 NOTE — PROGRESS NOTES
[facility]  [address] [address]  Dept: [phone]  Dept Fax: [phone]  Loc: [phone]    Kanchan Summers  is a 80 y.o. female who presents today for her medical conditions/complaints as noted below. Kanchan Summers is c/o of     Chief Complaint   Patient presents with    Anxiety    COPD    Hypertension    Dementia       HPI:   Xena Berg is being seen for her regular monthly follow up while at [facility]. Overall patient has been doing very well. She reports today that she is feeling a little bit short of breath, but she had been just walking in the hallways just prior to me coming in to see her, I suspect that is probably why. She has not been coughing any more than normal. She was not complaining of any increased sinus pain or pressure at this point and no sore throat. She does not feel like she has been wheezing. Overall, she reports that she feels like her lungs have been pretty stable. She denies any issues with headaches. She denies any problems with chest pain. No abdominal pain, constipation, or diarrhea. She reports that she still has an excellent appetite and she enjoys snacking. She also reports that she has been sleeping well as that has not been a problem for her in many years. She has also not seemed overly anxious to the nursing staff.      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 TC, RRT 4/20/18   Substance Use Topics    Alcohol use: No     Alcohol/week: 0.0 standard drinks      Prior to Visit Medications    Medication Sig Taking?  Authorizing Provider   acetaminophen (TYLENOL) 325 MG tablet Take 650 mg by mouth 2 times daily  Historical Provider, MD   albuterol (PROVENTIL) (2.5 MG/3ML) 0.083% nebulizer solution Take 2.5 mg by nebulization 2 times daily  Historical Provider, MD   acetaminophen (TYLENOL) 500 MG tablet Take 500 mg by mouth every 4 hours as needed for Pain or Fever  Historical Provider, MD   benzonatate (TESSALON) 200 MG capsule Take 200 mg by mouth 3 times daily as needed for Cough  Historical Provider, MD   guaiFENesin (ROBITUSSIN) 100 MG/5ML syrup Take 200 mg by mouth 4 times daily as needed for Cough or Congestion  Historical Provider, MD   PARoxetine (PAXIL) 10 MG tablet Take 10 mg by mouth nightly  Historical Provider, MD   busPIRone (BUSPAR) 10 MG tablet Take 2 tablets by mouth 3 times daily  Patient taking differently: Take 7.5 mg by mouth nightly 10mg in morning, 7.5mg at night  Nicole Torres MD   lisinopril (PRINIVIL;ZESTRIL) 5 MG tablet Take 1 tablet by mouth daily  Santosh Mcguire   metoprolol tartrate (LOPRESSOR) 25 MG tablet Take 1 tablet by mouth 2 times daily  Hal Tesfaye   bethanechol (URECHOLINE) 25 MG tablet Take 1 tablet by mouth 4 times daily  Santosh Mcguire   Multiple Vitamin (MULTI VITAMIN DAILY) TABS Take 1 tablet by mouth daily Take 1 tab po daily  Franklin Atkins MD   fluticasone (FLONASE) 50 MCG/ACT nasal spray 1 spray by Nasal route daily  Franklin Atkins MD hydrochlorothiazide (MICROZIDE) 12.5 MG capsule take 1 capsule by mouth once daily  Lila Alejandra MD   Calcium Carbonate (CALCIUM 600 PO) Take 600 mg by mouth 2 times daily  Historical Provider, MD   loratadine (CLARITIN) 10 MG capsule Take 1 capsule by mouth daily  Lila Alejandra MD   isosorbide mononitrate (IMDUR) 30 MG extended release tablet Take 1 tablet by mouth daily  Lila Alejandra MD   potassium chloride (KLOR-CON M) 20 MEQ extended release tablet Take 1 tablet by mouth 2 times daily  Lila Alejandra MD   aspirin 81 MG chewable tablet Take 1 tablet by mouth daily  Lila Alejandra MD   budesonide-formoterol (SYMBICORT) 80-4.5 MCG/ACT AERO Inhale 2 puffs into the lungs 2 times daily  Willi Sanders MD   Cholecalciferol (VITAMIN D) 2000 UNITS CAPS capsule Take 4,000 Units by mouth daily  Historical Provider, MD   nitroGLYCERIN (NITROSTAT) 0.4 MG SL tablet Place 1 tablet under the tongue every 5 minutes as needed for Chest pain  Lora Barron MD   aluminum & magnesium hydroxide-simethicone (MAALOX REGULAR STRENGTH) 200-200-20 MG/5ML SUSP suspension Take 30 mLs by mouth every 6 hours as needed for Jake Ordoñez MD   mirtazapine (REMERON) 15 MG tablet Take 0.5 tablets by mouth nightly  Lila Alejandra MD   levothyroxine (SYNTHROID) 50 MCG tablet Take 1 tablet by mouth Daily  Lila Alejandra MD   donepezil (ARICEPT) 10 MG tablet Take 10 mg by mouth daily  Historical Provider, MD   simethicone (MYLICON) 629 MG chewable tablet Take 125 mg by mouth every 6 hours as needed for Flatulence  Historical Provider, MD   OXYGEN Inhale 2 L/min into the lungs nightly.   Historical Provider, MD     Allergies   Allergen Reactions    Amlodipine Other (See Comments)     Pt says she got all the side effects      Sulfa Antibiotics     Cleocin [Clindamycin] Rash    Macrolides And Ketolides Rash     \"All mycins\"    Pcn [Penicillins] Rash    Tetracyclines & Related Rash  Zithromax [Azithromycin] Rash       Subjective:      Review of Systems   Constitutional: Negative for activity change, appetite change, chills, fatigue and fever. Respiratory: Positive for shortness of breath. Negative for cough, chest tightness and wheezing. Cardiovascular: Negative for chest pain, palpitations and leg swelling. Gastrointestinal: Negative for abdominal pain, constipation and diarrhea. Genitourinary: Negative for difficulty urinating. Skin: Negative for rash. Neurological: Negative for dizziness, syncope, weakness, light-headedness and headaches. Psychiatric/Behavioral: Positive for confusion and decreased concentration. Negative for behavioral problems, dysphoric mood and sleep disturbance. The patient is not nervous/anxious. Objective:     Physical Exam  Vitals signs and nursing note reviewed. Constitutional:       General: She is not in acute distress. Appearance: She is well-developed. Eyes:      Conjunctiva/sclera: Conjunctivae normal.   Neck:      Musculoskeletal: Normal range of motion and neck supple. Thyroid: No thyromegaly. Cardiovascular:      Rate and Rhythm: Normal rate and regular rhythm. Heart sounds: Normal heart sounds. No murmur. Pulmonary:      Effort: Pulmonary effort is normal. No respiratory distress. Breath sounds: Normal breath sounds. No wheezing. Abdominal:      General: Abdomen is flat. Bowel sounds are normal.      Palpations: Abdomen is soft. Tenderness: There is no abdominal tenderness. There is no guarding or rebound. Lymphadenopathy:      Cervical: No cervical adenopathy. Skin:     General: Skin is warm and dry. Findings: No erythema or rash. Neurological:      General: No focal deficit present. Mental Status: She is alert. Mental status is at baseline.    Psychiatric:         Mood and Affect: Mood normal.         Speech: Speech normal.         Behavior: Behavior normal. Tamela Santiago MD, personally performed the services described in this document as transcribed by the , and it is both accurate and complete.     Electronically signed by Arianna Diaz MD on 1/21/21 at 1:01 PM EST

## 2021-02-02 ENCOUNTER — TELEPHONE (OUTPATIENT)
Dept: FAMILY MEDICINE CLINIC | Age: 86
End: 2021-02-02

## 2021-02-17 ENCOUNTER — OUTSIDE SERVICES (OUTPATIENT)
Dept: FAMILY MEDICINE CLINIC | Age: 86
End: 2021-02-17
Payer: MEDICARE

## 2021-02-17 DIAGNOSIS — G30.1 LATE ONSET ALZHEIMER'S DISEASE WITHOUT BEHAVIORAL DISTURBANCE (HCC): ICD-10-CM

## 2021-02-17 DIAGNOSIS — F22 PARANOIA (HCC): ICD-10-CM

## 2021-02-17 DIAGNOSIS — I10 ESSENTIAL HYPERTENSION: ICD-10-CM

## 2021-02-17 DIAGNOSIS — J41.1 MUCOPURULENT CHRONIC BRONCHITIS (HCC): Primary | ICD-10-CM

## 2021-02-17 DIAGNOSIS — F02.80 LATE ONSET ALZHEIMER'S DISEASE WITHOUT BEHAVIORAL DISTURBANCE (HCC): ICD-10-CM

## 2021-02-17 PROCEDURE — 99309 SBSQ NF CARE MODERATE MDM 30: CPT | Performed by: FAMILY MEDICINE

## 2021-02-18 ASSESSMENT — ENCOUNTER SYMPTOMS
WHEEZING: 0
CHEST TIGHTNESS: 1
COUGH: 0
CONSTIPATION: 0
DIARRHEA: 0
SHORTNESS OF BREATH: 0
ABDOMINAL PAIN: 0

## 2021-02-18 NOTE — PROGRESS NOTES
[facility]  [address] [address]  Dept: [phone]  Dept Fax: [phone]  Loc: [phone]    Matt Banuelos  is a 80 y.o. female who presents today for her medical conditions/complaints as noted below. Matt Banuelos is c/o of     Chief Complaint   Patient presents with    COPD    Dementia    Anxiety    Hypertension       HPI:   Maggie Pena is being seen for her regular monthly follow up while at [facility]. Overall, patient reports that she is doing pretty well. She has not had any issues with chest pains and her shortness of breath is at baseline. She continues to feel a little bit tight and she has an occasional cough but nothing that is worse than normal. She actually feels that she is doing a little bit better than she typically does this time of year. She has not had any problems with increased sputum production, and she has not had any fevers. Overall, she is eating well [place] she has a really good appetite. no problems with abdominal pain, constipation, or diarrhea. She has had some issues with sinus congestion recently but that is pretty normal for her and is not anything that she notices is worse than normal she just feels like her nose is plugged up due to the dry air. The occasional sinus pressure does tend to give her some headaches but again nothing that is worse than her normal. She feels that overall she has been quite stable. She has not had any issues with panic attacks recently. She has no issues with had trouble sleeping. Overall, she is really happy with how she is doing and she has continued to participate in activities. She actually won during [employer] today.      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 TC, RRT 4/20/18   Substance Use Topics    Alcohol use: No     Alcohol/week: 0.0 standard drinks      Prior to Visit Medications    Medication Sig Taking?  Authorizing Provider   acetaminophen (TYLENOL) 325 MG tablet Take 650 mg by mouth 2 times daily  Historical Provider, MD   albuterol (PROVENTIL) (2.5 MG/3ML) 0.083% nebulizer solution Take 2.5 mg by nebulization 2 times daily  Historical Provider, MD   acetaminophen (TYLENOL) 500 MG tablet Take 500 mg by mouth every 4 hours as needed for Pain or Fever  Historical Provider, MD   benzonatate (TESSALON) 200 MG capsule Take 200 mg by mouth 3 times daily as needed for Cough  Historical Provider, MD   guaiFENesin (ROBITUSSIN) 100 MG/5ML syrup Take 200 mg by mouth 4 times daily as needed for Cough or Congestion  Historical Provider, MD   PARoxetine (PAXIL) 10 MG tablet Take 10 mg by mouth nightly  Historical Provider, MD   busPIRone (BUSPAR) 10 MG tablet Take 2 tablets by mouth 3 times daily  Patient taking differently: Take 7.5 mg by mouth nightly 10mg in morning, 7.5mg at night  Mena Espinoza MD   lisinopril (PRINIVIL;ZESTRIL) 5 MG tablet Take 1 tablet by mouth daily  Arun Meyer   metoprolol tartrate (LOPRESSOR) 25 MG tablet Take 1 tablet by mouth 2 times daily  Arun Meyer bethanechol (URECHOLINE) 25 MG tablet Take 1 tablet by mouth 4 times daily  Ole Conley   Multiple Vitamin (MULTI VITAMIN DAILY) TABS Take 1 tablet by mouth daily Take 1 tab po daily  Hardik Ball MD   fluticasone (FLONASE) 50 MCG/ACT nasal spray 1 spray by Nasal route daily  Hardik Ball MD   hydrochlorothiazide (MICROZIDE) 12.5 MG capsule take 1 capsule by mouth once daily  Hardik Ball MD   Calcium Carbonate (CALCIUM 600 PO) Take 600 mg by mouth 2 times daily  Historical Provider, MD   loratadine (CLARITIN) 10 MG capsule Take 1 capsule by mouth daily  Hardik Ball MD   isosorbide mononitrate (IMDUR) 30 MG extended release tablet Take 1 tablet by mouth daily  Hardik Ball MD   potassium chloride (KLOR-CON M) 20 MEQ extended release tablet Take 1 tablet by mouth 2 times daily  Hardik Ball MD   aspirin 81 MG chewable tablet Take 1 tablet by mouth daily  Hradik Ball MD   budesonide-formoterol (SYMBICORT) 80-4.5 MCG/ACT AERO Inhale 2 puffs into the lungs 2 times daily  Mario Winter MD   Cholecalciferol (VITAMIN D) 2000 UNITS CAPS capsule Take 4,000 Units by mouth daily  Historical Provider, MD   nitroGLYCERIN (NITROSTAT) 0.4 MG SL tablet Place 1 tablet under the tongue every 5 minutes as needed for Chest pain  Marcin Youngblood MD   aluminum & magnesium hydroxide-simethicone (MAALOX REGULAR STRENGTH) 200-200-20 MG/5ML SUSP suspension Take 30 mLs by mouth every 6 hours as needed for Gabby Blend, MD   mirtazapine (REMERON) 15 MG tablet Take 0.5 tablets by mouth nightly  Hardik Ball MD   levothyroxine (SYNTHROID) 50 MCG tablet Take 1 tablet by mouth Daily  Hardik Ball MD   donepezil (ARICEPT) 10 MG tablet Take 10 mg by mouth daily  Historical Provider, MD   simethicone (MYLICON) 441 MG chewable tablet Take 125 mg by mouth every 6 hours as needed for Flatulence  Historical Provider, MD OXYGEN Inhale 2 L/min into the lungs nightly. Historical Provider, MD     Allergies   Allergen Reactions    Amlodipine Other (See Comments)     Pt says she got all the side effects      Sulfa Antibiotics     Cleocin [Clindamycin] Rash    Macrolides And Ketolides Rash     \"All mycins\"    Pcn [Penicillins] Rash    Tetracyclines & Related Rash    Zithromax [Azithromycin] Rash       Subjective:      Review of Systems   Constitutional: Negative for activity change, appetite change, chills, fatigue and fever. HENT: Positive for congestion. Respiratory: Positive for chest tightness. Negative for cough, shortness of breath and wheezing. Cardiovascular: Negative for chest pain, palpitations and leg swelling. Gastrointestinal: Negative for abdominal pain, constipation and diarrhea. Genitourinary: Negative for difficulty urinating. Skin: Negative for rash. Neurological: Negative for dizziness, syncope, weakness, light-headedness and headaches. Psychiatric/Behavioral: Positive for confusion and decreased concentration. Negative for behavioral problems, dysphoric mood and sleep disturbance. The patient is not nervous/anxious. Objective:     Physical Exam  Vitals signs and nursing note reviewed. Constitutional:       General: She is not in acute distress. Appearance: She is well-developed. Eyes:      Conjunctiva/sclera: Conjunctivae normal.   Neck:      Musculoskeletal: Normal range of motion and neck supple. Thyroid: No thyromegaly. Cardiovascular:      Rate and Rhythm: Normal rate and regular rhythm. Heart sounds: Normal heart sounds. No murmur. Pulmonary:      Effort: Pulmonary effort is normal. No respiratory distress. Breath sounds: Normal breath sounds. No wheezing. Abdominal:      General: Abdomen is flat. Bowel sounds are normal.      Palpations: Abdomen is soft. Tenderness: There is no abdominal tenderness. There is no guarding or rebound. Lymphadenopathy:      Cervical: No cervical adenopathy. Skin:     General: Skin is warm and dry. Findings: No erythema or rash. Neurological:      General: No focal deficit present. Mental Status: She is alert. Mental status is at baseline. Psychiatric:         Mood and Affect: Mood normal.         Speech: Speech normal.         Behavior: Behavior normal.         Cognition and Memory: Memory is impaired. Assessment:       Diagnosis Orders   1. Mucopurulent chronic bronchitis ([facility] [address]     2. Paranoia ([facility] [address]     3. Essential hypertension     4. Late onset Alzheimer's disease without behavioral disturbance ([facility] [address]               Plan:   1. COPD. Stable. She has actually been doing really well recently. She has not had any problems with any COPD exacerbations in quite a while. She is not wheezing. Her lungs did not seem to have any prolonged issues since having COVID. She will continue to use her albuterol as needed. 2. Paranoia. Stable. She has actually been doing really well recently. She was a little bit worried today because her roommate was saying that she is not feeling well and so Rachel Devlin is concerned about her roommate could have the flu but I reassured her that her roommate is not actually sick and it is highly unlikely that Rachel Devlin will get the flu. 3. Hypertension. Stable. Her blood pressure has been really well controlled, and she has not had any issues with chest pains. I will continue to monitor. 4. Dementia. Stable. She has not had any recent changes in behavior or mental function. I will continue to monitor. Today she actually remembered the name of my daughter which overall is pretty good, and she does always ask about my kids so she does remember them and a general idea of how old they are. Return in about 1 month (around 3/17/2021) for HTN follow up. Patient given educational materials - see patient instructions. Discussed use, benefit, and side effects of prescribed medications. All patient questions answered. Patient voiced understanding. Reviewed health maintenance. Instructed to continue current medications, diet and exercise. Patient agreed with treatment plan. Follow up as directed. Sonam Villegas am personally transcribing for Keyla Rosales MD 2/18/21 at 12:33 PM EST. Kojo Miller MD, personally performed the services described in this document as transcribed by the , and it is both accurate and complete.     Electronically signed by Keyla Rosales MD on 2/18/21 at 4:55 PM EST

## 2021-03-09 ENCOUNTER — TELEPHONE (OUTPATIENT)
Dept: FAMILY MEDICINE CLINIC | Age: 86
End: 2021-03-09

## 2021-03-09 RX ORDER — CEPHALEXIN 500 MG/1
CAPSULE ORAL
COMMUNITY
Start: 2021-02-19 | End: 2021-03-09

## 2021-03-09 RX ORDER — MIRTAZAPINE 7.5 MG/1
7.5 TABLET, FILM COATED ORAL NIGHTLY
Status: ON HOLD | COMMUNITY
Start: 2021-02-20 | End: 2022-09-20 | Stop reason: SDUPTHER

## 2021-03-09 RX ORDER — BUSPIRONE HYDROCHLORIDE 7.5 MG/1
7.5 TABLET ORAL NIGHTLY
Status: ON HOLD | COMMUNITY
Start: 2021-02-27 | End: 2022-09-20 | Stop reason: SDUPTHER

## 2021-03-16 ENCOUNTER — OUTSIDE SERVICES (OUTPATIENT)
Dept: FAMILY MEDICINE CLINIC | Age: 86
End: 2021-03-16
Payer: MEDICARE

## 2021-03-16 DIAGNOSIS — I10 ESSENTIAL HYPERTENSION: ICD-10-CM

## 2021-03-16 DIAGNOSIS — F02.80 LATE ONSET ALZHEIMER'S DISEASE WITHOUT BEHAVIORAL DISTURBANCE (HCC): ICD-10-CM

## 2021-03-16 DIAGNOSIS — I20.9 ISCHEMIC CHEST PAIN (HCC): ICD-10-CM

## 2021-03-16 DIAGNOSIS — J41.1 MUCOPURULENT CHRONIC BRONCHITIS (HCC): Primary | ICD-10-CM

## 2021-03-16 DIAGNOSIS — F22 PARANOIA (HCC): ICD-10-CM

## 2021-03-16 DIAGNOSIS — G30.1 LATE ONSET ALZHEIMER'S DISEASE WITHOUT BEHAVIORAL DISTURBANCE (HCC): ICD-10-CM

## 2021-03-16 PROCEDURE — 99309 SBSQ NF CARE MODERATE MDM 30: CPT | Performed by: FAMILY MEDICINE

## 2021-03-19 ASSESSMENT — ENCOUNTER SYMPTOMS
SHORTNESS OF BREATH: 1
DIARRHEA: 0
CHEST TIGHTNESS: 0
COUGH: 0
ABDOMINAL PAIN: 0
WHEEZING: 0
CONSTIPATION: 0

## 2021-03-19 NOTE — PROGRESS NOTES
[facility] [address]  [address] [address]  Dept: [phone]  Dept Fax: [phone]  Loc: [phone]    Lina Lopez  is a 80 y.o. female who presents today for her medical conditions/complaints as noted below. Lina Lopez is c/o of     Chief Complaint   Patient presents with    COPD    Anxiety    Hypertension    Dementia       HPI:   Ashu Velázquez is being seen for her regular monthly follow up while at [facility]. Overall, patient reports that she is doing pretty well. she does not have any new complaints today. She reports that she continues to have a chronic headache but that is pretty standard for her. She has a headache most every day. She also reports that she continues to be short of breath but again that has been pretty standard for her with no significant changes recently. She denies any issues with coughing and her inhaler and nebulizer do improve her shortness of breath when she takes them. She has not had any issues with abdominal pain. No constipation, diarrhea, or nausea. Overall, she feels pretty happy with how things have been going. She is getting a little bit tired of the isolation from [facility] and she is looking forward to when they are allowed to have visitors or leave the building again. She denies any issues with chest pains and she has not had any swelling in her legs. Nursing staff has not noticed any issues with her being overly anxious and she has not had any episodes that appear to either be a TIA or concerning chest pain which tend to happen more often when she is overly anxious. She continues to like to participate in activities such as bingo. She also has not shown any concerns for worsening dementia. She has not had any problems with wandering really ever.      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 TC, RRT 4/20/18   Substance Use Topics    Alcohol use: No     Alcohol/week: 0.0 standard drinks      Prior to Visit Medications    Medication Sig Taking?  Authorizing Provider   mirtazapine (REMERON) 7.5 MG tablet   Historical Provider, MD   busPIRone (BUSPAR) 7.5 MG tablet   Historical Provider, MD   acetaminophen (TYLENOL) 325 MG tablet Take 650 mg by mouth 2 times daily  Historical Provider, MD   albuterol (PROVENTIL) (2.5 MG/3ML) 0.083% nebulizer solution Take 2.5 mg by nebulization 2 times daily  Historical Provider, MD   acetaminophen (TYLENOL) 500 MG tablet Take 500 mg by mouth every 4 hours as needed for Pain or Fever  Historical Provider, MD   benzonatate (TESSALON) 200 MG capsule Take 200 mg by mouth 3 times daily as needed for Cough  Historical Provider, MD   guaiFENesin (ROBITUSSIN) 100 MG/5ML syrup Take 200 mg by mouth 4 times daily as needed for Cough or Congestion  Historical Provider, MD   PARoxetine (PAXIL) 10 MG tablet Take 10 mg by mouth nightly  Historical Provider, MD   busPIRone (BUSPAR) 10 MG tablet Take 2 tablets by mouth 3 times daily  Patient taking differently: Take 7.5 mg by mouth nightly 10mg in morning, 7.5mg at night  Natali Bailey MD lisinopril (PRINIVIL;ZESTRIL) 5 MG tablet Take 1 tablet by mouth daily  Pamalee Alyssa   metoprolol tartrate (LOPRESSOR) 25 MG tablet Take 1 tablet by mouth 2 times daily  Angelo Tesfaye   bethanechol (URECHOLINE) 25 MG tablet Take 1 tablet by mouth 4 times daily  Pamalee Alyssa   Multiple Vitamin (MULTI VITAMIN DAILY) TABS Take 1 tablet by mouth daily Take 1 tab po daily  Soledad Cowan MD   fluticasone (FLONASE) 50 MCG/ACT nasal spray 1 spray by Nasal route daily  Soledad Cowan MD   hydrochlorothiazide (MICROZIDE) 12.5 MG capsule take 1 capsule by mouth once daily  Soledad Cowan MD   Calcium Carbonate (CALCIUM 600 PO) Take 600 mg by mouth 2 times daily  Historical Provider, MD   loratadine (CLARITIN) 10 MG capsule Take 1 capsule by mouth daily  Soledad Cowan MD   isosorbide mononitrate (IMDUR) 30 MG extended release tablet Take 1 tablet by mouth daily  Soledad Cowan MD   potassium chloride (KLOR-CON M) 20 MEQ extended release tablet Take 1 tablet by mouth 2 times daily  Soledad Cowan MD   aspirin 81 MG chewable tablet Take 1 tablet by mouth daily  Soledad Cowan MD   budesonide-formoterol (SYMBICORT) 80-4.5 MCG/ACT AERO Inhale 2 puffs into the lungs 2 times daily  Armen Linares MD   Cholecalciferol (VITAMIN D) 2000 UNITS CAPS capsule Take 4,000 Units by mouth daily  Historical Provider, MD   nitroGLYCERIN (NITROSTAT) 0.4 MG SL tablet Place 1 tablet under the tongue every 5 minutes as needed for Chest pain  Judah Heaton MD   aluminum & magnesium hydroxide-simethicone (MAALOX REGULAR STRENGTH) 200-200-20 MG/5ML SUSP suspension Take 30 mLs by mouth every 6 hours as needed for Indigestion  Soledad Cowan MD   mirtazapine (REMERON) 15 MG tablet Take 0.5 tablets by mouth nightly  Soledad Cowan MD   levothyroxine (SYNTHROID) 50 MCG tablet Take 1 tablet by mouth Daily  Soledad Cowan MD   donepezil (ARICEPT) 10 MG tablet Take 10 mg by mouth daily  Historical Provider, MD   simethicone (MYLICON) 052 MG chewable tablet Take 125 mg by mouth every 6 hours as needed for Flatulence  Historical Provider, MD   OXYGEN Inhale 2 L/min into the lungs nightly. Historical Provider, MD     Allergies   Allergen Reactions    Amlodipine Other (See Comments)     Pt says she got all the side effects      Sulfa Antibiotics     Cleocin [Clindamycin] Rash    Macrolides And Ketolides Rash     \"All mycins\"    Pcn [Penicillins] Rash    Tetracyclines & Related Rash    Zithromax [Azithromycin] Rash       Subjective:      Review of Systems   Constitutional: Negative for activity change, appetite change, chills, fatigue and fever. Respiratory: Positive for shortness of breath. Negative for cough, chest tightness and wheezing. Cardiovascular: Negative for chest pain, palpitations and leg swelling. Gastrointestinal: Negative for abdominal pain, constipation and diarrhea. Genitourinary: Negative for difficulty urinating. Skin: Negative for rash. Neurological: Positive for headaches. Negative for dizziness, syncope, weakness and light-headedness. Psychiatric/Behavioral: Positive for confusion and decreased concentration. Negative for behavioral problems, dysphoric mood and sleep disturbance. The patient is not nervous/anxious. Objective:     Physical Exam  Vitals signs and nursing note reviewed. Constitutional:       General: She is not in acute distress. Appearance: She is well-developed. Eyes:      Conjunctiva/sclera: Conjunctivae normal.   Neck:      Musculoskeletal: Normal range of motion and neck supple. Thyroid: No thyromegaly. Cardiovascular:      Rate and Rhythm: Normal rate and regular rhythm. Heart sounds: Normal heart sounds. No murmur. Pulmonary:      Effort: Pulmonary effort is normal. No respiratory distress. Breath sounds: Normal breath sounds. No wheezing. Abdominal:      General: Abdomen is flat. Bowel sounds are normal.      Palpations: Abdomen is soft. Tenderness: There is no abdominal tenderness. There is no guarding or rebound. Lymphadenopathy:      Cervical: No cervical adenopathy. Skin:     General: Skin is warm and dry. Findings: No erythema or rash. Neurological:      General: No focal deficit present. Mental Status: She is alert. Mental status is at baseline. Psychiatric:         Mood and Affect: Mood normal.         Speech: Speech normal.         Behavior: Behavior normal.         Cognition and Memory: Memory is impaired. Assessment:       Diagnosis Orders   1. Mucopurulent chronic bronchitis ([facility] [address]     2. Paranoia ([facility] [address]     3. Essential hypertension     4. Late onset Alzheimer's disease without behavioral disturbance ([facility] [address]     5. Ischemic chest pain ([facility] [address]               Plan:   1. COPD. Stable. She continues still have issues with shortness of breath on a semi regular basis, but her albuterol and nebulizer are working quite well for her therefore I will continue her current doses. I will also continue to monitor. 2. Paranoia. Stable. She has not really had any episodes of seeming to be overly paranoid for the last several months and she seems fairly relaxed recently. 3. Hypertension. Stable. No issues with her blood pressure recently. She has not had any hypertensive urgency in almost a year. 4. Dementia. Stable. As noted, no issues with wandering and no issues with worsening memory or behavioral disturbances. 5. Chest pain. Stable. She has not had any chest pain in almost a year. She has fairly active walking around the facility on a regular basis Thus she does not seem to have any issues on exertion with chest pains. Return in about 1 month (around 4/16/2021) for COPD follow up. Patient given educational materials - see patient instructions. Discussed use, benefit, and side effects of prescribed medications. All patient questions answered. Patient voiced understanding. Reviewed health maintenance. Instructed to continue current medications, diet and exercise. Patient agreed with treatment plan. Follow up as directed. Melina Woodard am personally transcribing for Yakelin Villanueva MD 3/19/21 at 10:17 AM EDT. Princess Maurisio MD, personally performed the services described in this document as transcribed by the , and it is both accurate and complete.     Electronically signed by Yakelin Villanueva MD on 3/19/21 at 1:10 PM EDT

## 2021-04-13 ENCOUNTER — TELEPHONE (OUTPATIENT)
Dept: FAMILY MEDICINE CLINIC | Age: 86
End: 2021-04-13

## 2021-04-21 ENCOUNTER — OUTSIDE SERVICES (OUTPATIENT)
Dept: FAMILY MEDICINE CLINIC | Age: 86
End: 2021-04-21
Payer: MEDICARE

## 2021-04-21 DIAGNOSIS — F22 PARANOIA (HCC): ICD-10-CM

## 2021-04-21 DIAGNOSIS — G30.1 LATE ONSET ALZHEIMER'S DISEASE WITHOUT BEHAVIORAL DISTURBANCE (HCC): ICD-10-CM

## 2021-04-21 DIAGNOSIS — I10 ESSENTIAL HYPERTENSION: ICD-10-CM

## 2021-04-21 DIAGNOSIS — J41.1 MUCOPURULENT CHRONIC BRONCHITIS (HCC): Primary | ICD-10-CM

## 2021-04-21 DIAGNOSIS — F02.80 LATE ONSET ALZHEIMER'S DISEASE WITHOUT BEHAVIORAL DISTURBANCE (HCC): ICD-10-CM

## 2021-04-21 PROCEDURE — 99309 SBSQ NF CARE MODERATE MDM 30: CPT | Performed by: FAMILY MEDICINE

## 2021-04-27 ASSESSMENT — ENCOUNTER SYMPTOMS
SHORTNESS OF BREATH: 0
CHEST TIGHTNESS: 1
WHEEZING: 0
DIARRHEA: 0
COUGH: 0
ABDOMINAL PAIN: 0
CONSTIPATION: 0

## 2021-04-27 NOTE — PROGRESS NOTES
[facility] [address]  [address] [id number]  Dept: [phone]  Dept Fax: [phone]  Loc: [phone]    Edie Fernandez  is a 80 y.o. female who presents today for her medical conditions/complaints as noted below. Edie Fernandez is c/o of     Chief Complaint   Patient presents with    Cough    Hypertension    Anxiety    Dementia       HPI:   Vu Garcia is being seen for her regular monthly follow up while at [facility]. Overall, patient is doing very well. She in fact told me that she feels like her chronic sinusitis seems to be improving and she feels like her shortness of breath is better than it ever has been. She continues to say that she does not have any problems with coughing although she does generally have a tightness in her chest. This tightness does improve when she takes albuterol, which she does twice a day scheduled, and she can ask for it as needed. She does complain of a headache, but this is a chronic problem for her, and she reports that nothing has been significantly different recently. She as I mentioned said her sinus seem to be improving. She said overall, she feels less congested which she is pretty happy about. I am not sure what seems to be helping with that, but I am glad that she is happy. Her roommate  a couple of weeks ago and I was asking her how she was feeling living in the room by herself but she seemed confused about the timeline and actually thought that her roommate passed away several years ago and she said she has been doing fine all alone. Her dementia does seem to be a little bit worse today thought as she informed me that she has been gone away from the facility for several months as she went home, which is why she thought that her roommate  a long time ago because she claims that she was not here for quite a while.  The patient has not left the facility since she originally got here, five years ago. Otherwise, though she seems fine. She has not had any problems with exit seeking behaviors, she has been sleeping well, and her appetite has been great. She is not complaining of any chest pain or abdominal pain. 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 TC, RRT 4/20/18   Substance Use Topics    Alcohol use: No     Alcohol/week: 0.0 standard drinks      Prior to Visit Medications    Medication Sig Taking?  Authorizing Provider   mirtazapine (REMERON) 7.5 MG tablet   Historical Provider, MD   busPIRone (BUSPAR) 7.5 MG tablet   Historical Provider, MD   acetaminophen (TYLENOL) 325 MG tablet Take 650 mg by mouth 2 times daily  Historical Provider, MD   albuterol (PROVENTIL) (2.5 MG/3ML) 0.083% nebulizer solution Take 2.5 mg by nebulization 2 times daily  Historical Provider, MD   acetaminophen (TYLENOL) 500 MG tablet Take 500 mg by mouth every 4 hours as needed for Pain or Fever  Historical Provider, MD   benzonatate (TESSALON) 200 MG capsule Take 200 mg by mouth 3 times daily as needed for Cough  Historical Provider, MD   guaiFENesin (ROBITUSSIN) 100 MG/5ML syrup Take 200 mg by mouth 4 times daily as needed for Cough or Congestion  Historical Provider, MD   PARoxetine (PAXIL) 10 MG tablet Take 10 mg by mouth nightly  Historical Provider, MD   busPIRone (BUSPAR) 10 MG tablet Take 2 tablets by mouth 3 times daily  Patient taking differently: Take 7.5 mg by mouth nightly 10mg in morning, 7.5mg at night  New Arnett MD   lisinopril (PRINIVIL;ZESTRIL) 5 MG tablet Take 1 tablet by mouth daily  Michael Lovell   metoprolol tartrate (LOPRESSOR) 25 MG tablet Take 1 tablet by mouth 2 times daily  Guillaume Tesfaye   bethanechol (URECHOLINE) 25 MG tablet Take 1 tablet by mouth 4 times daily  Michael Lovell   Multiple Vitamin (MULTI VITAMIN DAILY) TABS Take 1 tablet by mouth daily Take 1 tab po daily  Munir Andrew MD   fluticasone (FLONASE) 50 MCG/ACT nasal spray 1 spray by Nasal route daily  Munir Andrew MD   hydrochlorothiazide (MICROZIDE) 12.5 MG capsule take 1 capsule by mouth once daily  Munir Andrew MD   Calcium Carbonate (CALCIUM 600 PO) Take 600 mg by mouth 2 times daily  Historical Provider, MD   loratadine (CLARITIN) 10 MG capsule Take 1 capsule by mouth daily  Munir Andrew MD   isosorbide mononitrate (IMDUR) 30 MG extended release tablet Take 1 tablet by mouth daily  Munir Andrew MD   potassium chloride (KLOR-CON M) 20 MEQ extended release tablet Take 1 tablet by mouth 2 times daily  Munir Andrew MD   aspirin 81 MG chewable tablet Take 1 tablet by mouth daily  Munir Andrew MD   budesonide-formoterol (SYMBICORT) 80-4.5 MCG/ACT AERO Inhale 2 puffs into the lungs 2 times daily  Mel Elaine MD   Cholecalciferol (VITAMIN D) 2000 UNITS CAPS capsule Take 4,000 Units by mouth daily  Historical Provider, MD   nitroGLYCERIN (NITROSTAT) 0.4 MG SL tablet Place 1 tablet under the tongue every 5 minutes as needed for Chest pain  Karla Escobar MD   aluminum & magnesium hydroxide-simethicone ([place]) 118-644-72 MG/5ML SUSP suspension Take 30 mLs by mouth every 6 hours as needed for Denise MD Carmenza   mirtazapine (REMERON) 15 MG tablet Take 0.5 tablets by mouth nightly  Arminda Reina MD   levothyroxine (SYNTHROID) 50 MCG tablet Take 1 tablet by mouth Daily  Arminda Reina MD   donepezil (ARICEPT) 10 MG tablet Take 10 mg by mouth daily  Historical Provider, MD   simethicone (MYLICON) 446 MG chewable tablet Take 125 mg by mouth every 6 hours as needed for Flatulence  Historical Provider, MD   OXYGEN Inhale 2 L/min into the lungs nightly. Historical Provider, MD     Allergies   Allergen Reactions    Amlodipine Other (See Comments)     Pt says she got all the side effects      Sulfa Antibiotics     Cleocin [Clindamycin] Rash    Macrolides And Ketolides Rash     \"All mycins\"    Pcn [Penicillins] Rash    Tetracyclines & Related Rash    Zithromax [Azithromycin] Rash       Subjective:      Review of Systems   Constitutional: Negative for activity change, appetite change, chills, fatigue and fever. HENT: Positive for congestion. Respiratory: Positive for chest tightness. Negative for cough, shortness of breath and wheezing. Cardiovascular: Negative for chest pain, palpitations and leg swelling. Gastrointestinal: Negative for abdominal pain, constipation and diarrhea. Genitourinary: Negative for difficulty urinating. Skin: Negative for rash. Neurological: Negative for dizziness, syncope, weakness, light-headedness and headaches. Psychiatric/Behavioral: Positive for confusion and decreased concentration. Negative for behavioral problems, dysphoric mood and sleep disturbance. The patient is not nervous/anxious. Objective:     Physical Exam  Vitals signs and nursing note reviewed. Constitutional:       General: She is not in acute distress. Appearance: She is well-developed.    Eyes:      Conjunctiva/sclera: Conjunctivae normal.   Neck:      Musculoskeletal: Normal range of motion and

## 2021-05-05 ENCOUNTER — TELEPHONE (OUTPATIENT)
Dept: FAMILY MEDICINE CLINIC | Age: 86
End: 2021-05-05

## 2021-05-31 ENCOUNTER — OUTSIDE SERVICES (OUTPATIENT)
Dept: FAMILY MEDICINE CLINIC | Age: 86
End: 2021-05-31
Payer: MEDICARE

## 2021-05-31 DIAGNOSIS — I10 ESSENTIAL HYPERTENSION: ICD-10-CM

## 2021-05-31 DIAGNOSIS — J32.4 CHRONIC PANSINUSITIS: ICD-10-CM

## 2021-05-31 DIAGNOSIS — F22 PARANOIA (HCC): ICD-10-CM

## 2021-05-31 DIAGNOSIS — J41.1 MUCOPURULENT CHRONIC BRONCHITIS (HCC): Primary | ICD-10-CM

## 2021-05-31 DIAGNOSIS — F02.80 LATE ONSET ALZHEIMER'S DISEASE WITHOUT BEHAVIORAL DISTURBANCE (HCC): ICD-10-CM

## 2021-05-31 DIAGNOSIS — G30.1 LATE ONSET ALZHEIMER'S DISEASE WITHOUT BEHAVIORAL DISTURBANCE (HCC): ICD-10-CM

## 2021-05-31 PROCEDURE — 99309 SBSQ NF CARE MODERATE MDM 30: CPT | Performed by: FAMILY MEDICINE

## 2021-06-04 ASSESSMENT — ENCOUNTER SYMPTOMS
SHORTNESS OF BREATH: 1
CHEST TIGHTNESS: 1
COUGH: 0
WHEEZING: 0
DIARRHEA: 0
CONSTIPATION: 0
ABDOMINAL PAIN: 0

## 2021-06-04 NOTE — PROGRESS NOTES
[address]  [address] [facility] [address]  Dept: [phone]  Dept Fax: [phone]  Loc: [phone]    Otis Shaffer  is a [address] y.o. female who presents today for her medical conditions/complaints as noted below. Otis Shaffer is c/o of     Chief Complaint   Patient presents with    Cough    Hypertension    Anxiety    Sinusitis    Dementia       HPI:   Didier Grimm is being seen for her regular monthly follow up while at [facility]. Patient overall has been doing pretty well. She complains of being a little bit short of breath and having some chest tightness today, but it is raining, and she tends to feel tighter when it is more humid or when it is raining outside. She is not coughing though and not bringing up any phlegm. She did not seem at all short of breath when I was talking to her. She did report that she had a headache today due to the weather, but she said that that is related to her sinus is and is pretty much her baseline, so she was not concerned about it. She reports that she continues to have some sinus congestion but that over all seemed to be a little bit better today. She has not had any problems with sore throat or ear pain. She reports that she is eating well, and she really enjoys the food. She is not having any problems with abdominal pain, constipation, or diarrhea. She did mention today that she had some mild burning with urination and a little bit of increased frequency. I told her that I will go ahead and order a UA, to make sure that she does not have an infection. Her dementia also seemed a little bit worse to me today, because she reported to me that she is not frequently at [facility] and she has been home for the last several months and just recently came back.  I asked her what seems to bring her back and she said that sometimes people just show up at her house and bring her nebulization 2 times daily  Historical Provider, MD   acetaminophen (TYLENOL) 500 MG tablet Take 500 mg by mouth every 4 hours as needed for Pain or Fever  Historical Provider, MD   benzonatate (TESSALON) 200 MG capsule Take 200 mg by mouth 3 times daily as needed for Cough  Historical Provider, MD   guaiFENesin (ROBITUSSIN) 100 MG/5ML syrup Take 200 mg by mouth 4 times daily as needed for Cough or Congestion  Historical Provider, MD   PARoxetine (PAXIL) 10 MG tablet Take 10 mg by mouth nightly  Historical Provider, MD   busPIRone (BUSPAR) 10 MG tablet Take 2 tablets by mouth 3 times daily  Patient taking differently: Take 7.5 mg by mouth nightly 10mg in morning, 7.5mg at night  Bulmaro Salcido MD   lisinopril (PRINIVIL;ZESTRIL) 5 MG tablet Take 1 tablet by mouth daily  Nabeel Hutson   metoprolol tartrate (LOPRESSOR) 25 MG tablet Take 1 tablet by mouth 2 times daily  Euglopez Galicia Tesfaye   bethanechol (URECHOLINE) 25 MG tablet Take 1 tablet by mouth 4 times daily  Nabeel Hutson   Multiple Vitamin (MULTI VITAMIN DAILY) TABS Take 1 tablet by mouth daily Take 1 tab po daily  Judge Lauren MD   fluticasone (FLONASE) 50 MCG/ACT nasal spray 1 spray by Nasal route daily  Judge Lauren MD   hydrochlorothiazide (MICROZIDE) 12.5 MG capsule take 1 capsule by mouth once daily  Judge Lauren MD   Calcium Carbonate (CALCIUM 600 PO) Take 600 mg by mouth 2 times daily  Historical Provider, MD   loratadine (CLARITIN) 10 MG capsule Take 1 capsule by mouth daily  Judge Lauren MD   isosorbide mononitrate (IMDUR) 30 MG extended release tablet Take 1 tablet by mouth daily  Judge Lauren MD   potassium chloride (KLOR-CON M) 20 MEQ extended release tablet Take 1 tablet by mouth 2 times daily  Judge Lauren MD   aspirin 81 MG chewable tablet Take 1 tablet by mouth daily  Judge Lauren MD   budesonide-formoterol (SYMBICORT) 80-4.5 MCG/ACT AERO Inhale 2 puffs into the lungs 2 times daily  Joey Taylor MD Cholecalciferol (VITAMIN D) 2000 UNITS CAPS capsule Take 4,000 Units by mouth daily  Historical Provider, MD   nitroGLYCERIN (NITROSTAT) 0.4 MG SL tablet Place 1 tablet under the tongue every 5 minutes as needed for Chest pain  Romina Molina MD   aluminum & magnesium hydroxide-simethicone (MAALOX REGULAR STRENGTH) 200-200-20 MG/5ML SUSP suspension Take 30 mLs by mouth every 6 hours as needed for Ajit Marte MD   mirtazapine (REMERON) 15 MG tablet Take 0.5 tablets by mouth nightly  Dayton Cox MD   levothyroxine (SYNTHROID) 50 MCG tablet Take 1 tablet by mouth Daily  Dayton Cox MD   donepezil (ARICEPT) 10 MG tablet Take 10 mg by mouth daily  Historical Provider, MD   simethicone (MYLICON) 591 MG chewable tablet Take 125 mg by mouth every 6 hours as needed for Flatulence  Historical Provider, MD   OXYGEN Inhale 2 L/min into the lungs nightly. Historical Provider, MD     Allergies   Allergen Reactions    Amlodipine Other (See Comments)     Pt says she got all the side effects      Sulfa Antibiotics     Cleocin [Clindamycin] Rash    Macrolides And Ketolides Rash     \"All mycins\"    Pcn [Penicillins] Rash    Tetracyclines & Related Rash    Zithromax [Azithromycin] Rash       Subjective:      Review of Systems   Constitutional: Negative for activity change, appetite change, chills, fatigue and fever. Respiratory: Positive for chest tightness and shortness of breath. Negative for cough and wheezing. Cardiovascular: Negative for chest pain, palpitations and leg swelling. Gastrointestinal: Negative for abdominal pain, constipation and diarrhea. Genitourinary: Negative for difficulty urinating. Skin: Negative for rash. Neurological: Negative for dizziness, syncope, weakness, light-headedness and headaches. Psychiatric/Behavioral: Positive for confusion and decreased concentration. Negative for behavioral problems, dysphoric mood and sleep disturbance.  The patient is recently. She used to be really obsessive about the air quality in the building and she has not brought that up in the last several months. 3. Hypertension. Stable. No recent issues with her blood pressure and she has not been getting hypotensive. I will continue her current medications. 4. Dementia. Slightly worsening. She does not really seem to have any concept of timing and where she is in the building. Mostly because today she was convinced that she frequently goes home and spends weeks at a time there. She has not been in her own home in years because she actually came from an assisted living facility prior to coming from [address] [facility] [address]. she has not left the building [place] in the last four years. 5. Sinusitis. Stable. No acute bacterial infection and generally she has been doing pretty well. I will continue her current medications including Flonase. Return in about 1 month (around 6/30/2021) for HTN follow up, Anxiety follow up, COPD follow up. Patient given educational materials - see patient instructions. Discussed use, benefit, and side effects of prescribed medications. All patient questions answered. Patient voiced understanding. Reviewed health maintenance. Instructed to continue current medications, diet and exercise. Patient agreed with treatment plan. Follow up as directed. Robbie Tapia am personally transcribing for Carine Scott MD 6/4/21 at 4:27 PM EDT. Ashley Sims MD, personally performed the services described in this document as transcribed by the , and it is both accurate and complete.     Electronically signed by Carine Scott MD on 6/9/21 at 9:09 AM EDT

## 2021-06-08 ENCOUNTER — TELEPHONE (OUTPATIENT)
Dept: FAMILY MEDICINE CLINIC | Age: 86
End: 2021-06-08

## 2021-07-13 ENCOUNTER — OUTSIDE SERVICES (OUTPATIENT)
Dept: FAMILY MEDICINE CLINIC | Age: 86
End: 2021-07-13
Payer: MEDICARE

## 2021-07-13 DIAGNOSIS — I10 ESSENTIAL HYPERTENSION: ICD-10-CM

## 2021-07-13 DIAGNOSIS — J41.1 MUCOPURULENT CHRONIC BRONCHITIS (HCC): Primary | ICD-10-CM

## 2021-07-13 DIAGNOSIS — G30.1 LATE ONSET ALZHEIMER'S DISEASE WITHOUT BEHAVIORAL DISTURBANCE (HCC): ICD-10-CM

## 2021-07-13 DIAGNOSIS — F22 PARANOIA (HCC): ICD-10-CM

## 2021-07-13 DIAGNOSIS — F02.80 LATE ONSET ALZHEIMER'S DISEASE WITHOUT BEHAVIORAL DISTURBANCE (HCC): ICD-10-CM

## 2021-07-13 PROCEDURE — 99309 SBSQ NF CARE MODERATE MDM 30: CPT | Performed by: FAMILY MEDICINE

## 2021-07-22 ASSESSMENT — ENCOUNTER SYMPTOMS
SHORTNESS OF BREATH: 0
DIARRHEA: 0
COUGH: 1
CONSTIPATION: 0
ABDOMINAL PAIN: 0
WHEEZING: 0
SINUS PRESSURE: 1
CHEST TIGHTNESS: 1

## 2021-07-22 NOTE — PROGRESS NOTES
[address]  [address] [place] [address]  Dept: [phone]  Dept Fax: [phone]  Loc: [phone]    Starr Almodovar  is a 80 y.o. female who presents today for her medical conditions/complaints as noted below. Starr Almodovar is c/o of     Chief Complaint   Patient presents with    Cough    Anxiety    Hypertension    Dementia       HPI:   Marianela Srivastava is being seen for her regular monthly follow up while at [facility]. Overall, patient reports that she is doing quite well. She feels like nothing has really changed in the last several months and she is happy about that. Apparently today she had an episode that she felt more short of breath then normal but nursing staff gave her an aerosol treatment and her shortness of breath improved. Patient reports that she is not coughing too much but occasionally she said that she does try to force a cough to try to cough up some mucus but generally her cough has been pretty good. Otherwise, patient has not been feeling overly anxious. She does have a new roommate but that seems to be going OK for her. She does not seem upset by her roommate. Her roommate is fairly quiet and does not yell out, which is usually what does upset Marianela Srivastava. Otherwise, Marianela Srivastava has not had any change in her headaches. She continues to have chronic sinus headaches, but nothing seems to be new and she has not had any issues with sore throat or ear pain recently. She denies any issues with chest pain. No leg swelling. She still has a great appetite and no problems with her bowels.      Past Medical History:   Diagnosis Date    Arthritis     Chronic kidney disease     h/o mild renal insufficency    COPD (chronic obstructive pulmonary disease) (HCC)     Dyspnea     chronic    Hypertension     Hyponatremia     Left bundle branch block     chronic, old    Paranoia (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 TC, RRT 4/20/18   Substance Use Topics    Alcohol use: No     Alcohol/week: 0.0 standard drinks      Prior to Visit Medications    Medication Sig Taking?  Authorizing Provider   mirtazapine (REMERON) 7.5 MG tablet   Historical Provider, MD   busPIRone (BUSPAR) 7.5 MG tablet   Historical Provider, MD   acetaminophen (TYLENOL) 325 MG tablet Take 650 mg by mouth 2 times daily  Historical Provider, MD   albuterol (PROVENTIL) (2.5 MG/3ML) 0.083% nebulizer solution Take 2.5 mg by nebulization 2 times daily  Historical Provider, MD   acetaminophen (TYLENOL) 500 MG tablet Take 500 mg by mouth every 4 hours as needed for Pain or Fever  Historical Provider, MD   benzonatate (TESSALON) 200 MG capsule Take 200 mg by mouth 3 times daily as needed for Cough  Historical Provider, MD   guaiFENesin (ROBITUSSIN) 100 MG/5ML syrup Take 200 mg by mouth 4 times daily as needed for Cough or Congestion  Historical Provider, MD   PARoxetine (PAXIL) 10 MG tablet Take 10 mg by mouth nightly  Historical Provider, MD   busPIRone (BUSPAR) 10 MG tablet Take 2 tablets by mouth 3 times daily  Patient taking differently: Take 7.5 mg by mouth nightly 10mg in morning, 7.5mg at night  Hattie Marocs MD   lisinopril (PRINIVIL;ZESTRIL) 5 MG tablet Take 1 tablet by mouth daily  Tiana Montalvo   metoprolol tartrate (LOPRESSOR) 25 MG tablet Take 1 tablet by mouth 2 times daily  Tiana Montalvo   bethanechol (URECHOLINE) 25 MG tablet Take 1 tablet by mouth 4 times daily  Irene Mercado   Multiple Vitamin (MULTI VITAMIN DAILY) TABS Take 1 tablet by mouth daily Take 1 tab po daily  Kayla Salcido MD   fluticasone (FLONASE) 50 MCG/ACT nasal spray 1 spray by Nasal route daily  Kayla Salcido MD   hydrochlorothiazide (MICROZIDE) 12.5 MG capsule take 1 capsule by mouth once daily  Kayla Salcido MD   Calcium Carbonate (CALCIUM 600 PO) Take 600 mg by mouth 2 times daily  Historical Provider, MD   loratadine (CLARITIN) 10 MG capsule Take 1 capsule by mouth daily  Kayla Salcido MD   isosorbide mononitrate (IMDUR) 30 MG extended release tablet Take 1 tablet by mouth daily  Kayla Salcido MD   potassium chloride (KLOR-CON M) 20 MEQ extended release tablet Take 1 tablet by mouth 2 times daily  Kayla Salcido MD   aspirin 81 MG chewable tablet Take 1 tablet by mouth daily  Kayla Salcido MD   budesonide-formoterol (SYMBICORT) 80-4.5 MCG/ACT AERO Inhale 2 puffs into the lungs 2 times daily  Rashad Zaidi MD   Cholecalciferol (VITAMIN D) 2000 UNITS CAPS capsule Take 4,000 Units by mouth daily  Historical Provider, MD   nitroGLYCERIN (NITROSTAT) 0.4 MG SL tablet Place 1 tablet under the tongue every 5 minutes as needed for Chest pain  Reg Aviles MD   aluminum & magnesium hydroxide-simethicone (MAALOX REGULAR STRENGTH) 200-200-20 MG/5ML SUSP suspension Take 30 mLs by mouth every 6 hours as needed for Pansy MD Raciel   mirtazapine (REMERON) 15 MG tablet Take 0.5 tablets by mouth nightly  Kayla Salcido MD   levothyroxine (SYNTHROID) 50 MCG tablet Take 1 tablet by mouth Daily  Kayla Salcido MD   donepezil (ARICEPT) 10 MG tablet Take 10 mg by mouth daily  Historical Provider, MD   simethicone (MYLICON) 385 MG chewable tablet Take 125 mg by mouth every 6 hours as needed for Flatulence  Historical Provider, MD   OXYGEN Inhale 2 L/min into the lungs nightly.   Historical Provider, MD Allergies   Allergen Reactions    Amlodipine Other (See Comments)     Pt says she got all the side effects      Sulfa Antibiotics     Cleocin [Clindamycin] Rash    Macrolides And Ketolides Rash     \"All mycins\"    Pcn [Penicillins] Rash    Tetracyclines & Related Rash    Zithromax [Azithromycin] Rash       Subjective:      Review of Systems   Constitutional: Negative for activity change, appetite change, chills, fatigue and fever. HENT: Positive for sinus pressure. Respiratory: Positive for cough and chest tightness. Negative for shortness of breath and wheezing. Cardiovascular: Negative for chest pain, palpitations and leg swelling. Gastrointestinal: Negative for abdominal pain, constipation and diarrhea. Genitourinary: Negative for difficulty urinating. Skin: Negative for rash. Neurological: Negative for dizziness, syncope, weakness, light-headedness and headaches. Psychiatric/Behavioral: Positive for confusion and decreased concentration. Negative for behavioral problems, dysphoric mood and sleep disturbance. The patient is not nervous/anxious. Objective:     Physical Exam  Vitals and nursing note reviewed. Constitutional:       General: She is not in acute distress. Appearance: She is well-developed. Eyes:      Conjunctiva/sclera: Conjunctivae normal.   Neck:      Thyroid: No thyromegaly. Cardiovascular:      Rate and Rhythm: Normal rate and regular rhythm. Heart sounds: Normal heart sounds. No murmur heard. Pulmonary:      Effort: Pulmonary effort is normal. No respiratory distress. Breath sounds: Normal breath sounds. No wheezing. Abdominal:      General: Abdomen is flat. Bowel sounds are normal.      Palpations: Abdomen is soft. Tenderness: There is no abdominal tenderness. There is no guarding or rebound. Musculoskeletal:      Cervical back: Normal range of motion and neck supple. Lymphadenopathy:      Cervical: No cervical adenopathy. Skin:     General: Skin is warm and dry. Findings: No erythema or rash. Neurological:      General: No focal deficit present. Mental Status: She is alert. Mental status is at baseline. Psychiatric:         Mood and Affect: Mood normal.         Speech: Speech normal.         Behavior: Behavior normal.         Cognition and Memory: Memory is impaired. Assessment:       Diagnosis Orders   1. Mucopurulent chronic bronchitis ([facility] [address]     2. Paranoia ([facility] [address]     3. Essential hypertension     4. Late onset Alzheimer's disease without behavioral disturbance ([facility] [address]               Plan:   1. COPD. Stable. No recent issues. She continues to have some shortness of breath, specifically on humid days but it does resolve with albuterol. She has not had any recent worsening of cough. No wheezing. No change in sputum consistency or color. 2. Paranoia. Stable. She has actually been doing really well recently and has not had any recent issues where she felt like the air was not acceptable to her. Generally, her anxiety seems to be well controlled. I will continue to monitor. 3. Hypertension. Stable. No recent issues with her blood pressure. No problems with hypertensive urgency. No signs that her blood pressure has been high. 4. Dementia. Stable. She seems just a little bit more confused today in the sense that she asked me about my kids several times in a row and typically she only asks about them once listens to the answer and then continues on. Today, she asked listen to the answer and then asked again, so I really do not think that she was paying attention to what I was saying or her confusion seems to be a little bit worse but overall she did remember I have two kids and she was pretty close on her guess of how old they are. Return in about 1 month (around 8/13/2021) for COPD follow up, HTN follow up. Patient given educational materials - see patient instructions.   Discussed use, benefit, and side effects of prescribed medications. All patient questions answered. Patient voiced understanding. Reviewed health maintenance. Instructed to continue current medications, diet and exercise. Patient agreed with treatment plan. Follow up as directed. Judit Madsen am personally transcribing for Lucas Huff MD 7/22/21 at 11:00 AM EDT. Ned Humphrey MD, personally performed the services described in this document as transcribed by the , and it is both accurate and complete.     Electronically signed by Lucas Huff MD on 7/22/21 at 4:32 PM EDT

## 2021-08-17 ENCOUNTER — OUTSIDE SERVICES (OUTPATIENT)
Dept: FAMILY MEDICINE CLINIC | Age: 86
End: 2021-08-17
Payer: MEDICARE

## 2021-08-17 DIAGNOSIS — I10 ESSENTIAL HYPERTENSION: ICD-10-CM

## 2021-08-17 DIAGNOSIS — F22 PARANOIA (HCC): ICD-10-CM

## 2021-08-17 DIAGNOSIS — F02.80 LATE ONSET ALZHEIMER'S DISEASE WITHOUT BEHAVIORAL DISTURBANCE (HCC): ICD-10-CM

## 2021-08-17 DIAGNOSIS — J32.4 CHRONIC PANSINUSITIS: ICD-10-CM

## 2021-08-17 DIAGNOSIS — J41.1 MUCOPURULENT CHRONIC BRONCHITIS (HCC): Primary | ICD-10-CM

## 2021-08-17 DIAGNOSIS — G30.1 LATE ONSET ALZHEIMER'S DISEASE WITHOUT BEHAVIORAL DISTURBANCE (HCC): ICD-10-CM

## 2021-08-17 PROCEDURE — 99309 SBSQ NF CARE MODERATE MDM 30: CPT | Performed by: FAMILY MEDICINE

## 2021-08-25 ASSESSMENT — ENCOUNTER SYMPTOMS
SINUS PRESSURE: 1
ABDOMINAL PAIN: 0
CHEST TIGHTNESS: 0
WHEEZING: 0
CONSTIPATION: 0
SHORTNESS OF BREATH: 1
COUGH: 0
DIARRHEA: 0

## 2021-08-25 NOTE — PROGRESS NOTES
VERONICA Lion [facility]  [address] [id number]  Dept: [phone]  Dept Fax: [phone]  Loc: [phone]    Rosa Faulkner  is a 80 y.o. female who presents today for her medical conditions/complaints as noted below. Rosa Faulkner is c/o of     Chief Complaint   Patient presents with    COPD    Anxiety    Hypertension    Sinusitis    Dementia       HPI:   Danni Talley is being seen for her regular monthly follow up while at [facility]. Overall, patient has been doing very well. She continues to report that she is short of breath, which is chronic. She continues to mention that she has sinus congestion, which is also chronic. She also reports no acute events today. She did mention that her roommate moved out this morning, and Danni Talley does not seem to know why. Nursing staff mentioned that it is probably due to the two not getting along very well. Danni Talley otherwise reports that she has not been feeling anxious or depressed recently. She has not really needed to use her nebulizer any more than normal, and she continues to use it on average once a night prior to bed. When she does use her albuterol, she does get really good relief from it. The one thing that nursing staff had noted is that patient has a sore on her tongue that seems to have developed over the last week or so. When I looked at it, there is a very large ulcer on the left side of her tongue and it a smaller ulcer on the right side of her tongue. Patient does have a very sharp tooth on the left side which is likely what is causing the sore on her tongue. I asked her if it was painful and she said that it was somewhat bothersome, but it is only mildly affecting her ability to eat and overall it is not too terribly awful. I will treat with some magic mouthwash and hopefully this will help with the pain.  She does have an appointment coming up with the dentist on August 30, 2021. 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 TC, RRT 4/20/18   Substance Use Topics    Alcohol use: No     Alcohol/week: 0.0 standard drinks      Prior to Visit Medications    Medication Sig Taking?  Authorizing Provider   mirtazapine (REMERON) 7.5 MG tablet   Historical Provider, MD   busPIRone (BUSPAR) 7.5 MG tablet   Historical Provider, MD   acetaminophen (TYLENOL) 325 MG tablet Take 650 mg by mouth 2 times daily  Historical Provider, MD   albuterol (PROVENTIL) (2.5 MG/3ML) 0.083% nebulizer solution Take 2.5 mg by nebulization 2 times daily  Historical Provider, MD   acetaminophen (TYLENOL) 500 MG tablet Take 500 mg by mouth every 4 hours as needed for Pain or Fever  Historical Provider, MD   benzonatate (TESSALON) 200 MG capsule Take 200 mg by mouth 3 times daily as needed for Cough  Historical Provider, MD   guaiFENesin (ROBITUSSIN) 100 MG/5ML syrup Take 200 mg by mouth 4 times daily as needed for Cough or Congestion  Historical Provider, MD   PARoxetine (PAXIL) 10 MG tablet Take 10 mg by mouth nightly  Historical Provider, MD   busPIRone (BUSPAR) 10 MG tablet Take 1 tablet by mouth Daily  Cirsta Frausto MD   donepezil (ARICEPT) 10 MG tablet Take 10 mg by mouth daily  Historical Provider, MD   simethicone (MYLICON) 180 MG chewable tablet Take 125 mg by mouth every 6 hours as needed for Flatulence  Historical Provider, MD   OXYGEN Inhale 2 L/min into the lungs nightly. Historical Provider, MD     Allergies   Allergen Reactions    Amlodipine Other (See Comments)     Pt says she got all the side effects      Sulfa Antibiotics     Cleocin [Clindamycin] Rash    Macrolides And Ketolides Rash     \"All mycins\"    Pcn [Penicillins] Rash    Tetracyclines & Related Rash    Zithromax [Azithromycin] Rash       Subjective:      Review of Systems   Constitutional: Negative for activity change, appetite change, chills, fatigue and fever. HENT: Positive for dental problem, mouth sores (sore on tongue ) and sinus pressure. Respiratory: Positive for shortness of breath. Negative for cough, chest tightness and wheezing. Cardiovascular: Negative for chest pain, palpitations and leg swelling. Gastrointestinal: Negative for abdominal pain, constipation and diarrhea. Genitourinary: Negative for difficulty urinating. Skin: Negative for rash. Neurological: Negative for dizziness, syncope, weakness, light-headedness and headaches. Psychiatric/Behavioral: Positive for confusion and decreased concentration. Negative for behavioral problems, dysphoric mood and sleep disturbance. The patient is not nervous/anxious. Objective:     Physical Exam  Vitals and nursing note reviewed. Constitutional:       General: She is not in acute distress. Appearance: She is well-developed. HENT:      Mouth/Throat:      Comments: Large dime sized ulcer on left side of patient's tongue. Appears to be somewhat painful. No bleeding is present. Eyes:      Conjunctiva/sclera: Conjunctivae normal.   Neck:      Thyroid: No thyromegaly.    Cardiovascular:      Rate and Rhythm: Normal rate and regular rhythm. Heart sounds: Normal heart sounds. No murmur heard. Pulmonary:      Effort: Pulmonary effort is normal. No respiratory distress. Breath sounds: Normal breath sounds. No wheezing. Abdominal:      General: Abdomen is flat. Bowel sounds are normal.      Palpations: Abdomen is soft. Tenderness: There is no abdominal tenderness. There is no guarding or rebound. Musculoskeletal:      Cervical back: Normal range of motion and neck supple. Lymphadenopathy:      Cervical: No cervical adenopathy. Skin:     General: Skin is warm and dry. Findings: No erythema or rash. Neurological:      General: No focal deficit present. Mental Status: She is alert. Mental status is at baseline. Psychiatric:         Mood and Affect: Mood normal.         Speech: Speech normal.         Behavior: Behavior normal.         Cognition and Memory: Memory is impaired. Assessment:       Diagnosis Orders   1. Mucopurulent chronic bronchitis ([address]     2. Paranoia ([facility] [address]     3. Essential hypertension     4. Late onset Alzheimer's disease without behavioral disturbance ([address]     5. Chronic pansinusitis               Plan:   1. COPD. Stable. She continues to be doing pretty well. She is chronically short of breath but no recent issues with cough. I will continue her current inhalers and continue to monitor. 2. Paranoia. Stable. She continues to be chronically anxious but overall does not seem to be too bad recently. She is handling her roommate moving out very well today. She reports that she has been generally feeling pretty good. 3. Hypertension. Stable. No recent issues with her blood pressure. Nursing staff has not had any concerns. I will continue to monitor. 4. Dementia. Stable. She has not really had any acute changes in her mental status in the last several months. No problems with wandering. No issues with her appearing to be overly agitated. 5. Chronic sinusitis. Stable. No recent changes. She continues to complain of some pressure in her sinuses, but this has been an ongoing problem for her for many many years and is not acutely worse today. Return in about 1 month (around 9/17/2021) for HTN follow up. Patient given educational materials - see patient instructions. Discussed use, benefit, and side effects of prescribed medications. All patient questions answered. Patient voiced understanding. Reviewed health maintenance. Instructed to continue current medications, diet and exercise. Patient agreed with treatment plan. Follow up as directed. Kate Mary am personally transcribing for Esther Santiago MD 8/25/21 at 11:42 AM EDT. Mili Magaña MD, personally performed the services described in this document as transcribed by the , and it is both accurate and complete.     Electronically signed by Esther Santiago MD on 8/27/21 at 12:16 PM EDT

## 2021-09-21 ENCOUNTER — OUTSIDE SERVICES (OUTPATIENT)
Dept: FAMILY MEDICINE CLINIC | Age: 86
End: 2021-09-21
Payer: MEDICARE

## 2021-09-21 DIAGNOSIS — F22 PARANOIA (HCC): ICD-10-CM

## 2021-09-21 DIAGNOSIS — I10 ESSENTIAL HYPERTENSION: ICD-10-CM

## 2021-09-21 DIAGNOSIS — G30.1 LATE ONSET ALZHEIMER'S DISEASE WITHOUT BEHAVIORAL DISTURBANCE (HCC): ICD-10-CM

## 2021-09-21 DIAGNOSIS — F02.80 LATE ONSET ALZHEIMER'S DISEASE WITHOUT BEHAVIORAL DISTURBANCE (HCC): ICD-10-CM

## 2021-09-21 DIAGNOSIS — J41.1 MUCOPURULENT CHRONIC BRONCHITIS (HCC): Primary | ICD-10-CM

## 2021-09-21 PROCEDURE — 99309 SBSQ NF CARE MODERATE MDM 30: CPT | Performed by: FAMILY MEDICINE

## 2021-10-05 ENCOUNTER — OUTSIDE SERVICES (OUTPATIENT)
Dept: FAMILY MEDICINE CLINIC | Age: 86
End: 2021-10-05
Payer: MEDICARE

## 2021-10-05 DIAGNOSIS — F22 PARANOIA (HCC): ICD-10-CM

## 2021-10-05 DIAGNOSIS — G30.1 LATE ONSET ALZHEIMER'S DISEASE WITHOUT BEHAVIORAL DISTURBANCE (HCC): ICD-10-CM

## 2021-10-05 DIAGNOSIS — J41.1 MUCOPURULENT CHRONIC BRONCHITIS (HCC): Primary | ICD-10-CM

## 2021-10-05 DIAGNOSIS — I10 ESSENTIAL HYPERTENSION: ICD-10-CM

## 2021-10-05 DIAGNOSIS — L30.9 DERMATITIS: ICD-10-CM

## 2021-10-05 DIAGNOSIS — F02.80 LATE ONSET ALZHEIMER'S DISEASE WITHOUT BEHAVIORAL DISTURBANCE (HCC): ICD-10-CM

## 2021-10-05 PROCEDURE — 99309 SBSQ NF CARE MODERATE MDM 30: CPT | Performed by: FAMILY MEDICINE

## 2021-10-06 ASSESSMENT — ENCOUNTER SYMPTOMS
ABDOMINAL PAIN: 0
WHEEZING: 0
DIARRHEA: 0
SINUS PRESSURE: 1
SHORTNESS OF BREATH: 1
COUGH: 0
CONSTIPATION: 0
CHEST TIGHTNESS: 0

## 2021-10-06 NOTE — PROGRESS NOTES
VERONICA Lion [facility]  [address] [address]  Dept: [phone]  Dept Fax: [phone]  Loc: [phone]    Geeta Graves  is a 80 y.o. female who presents today for her medical conditions/complaints as noted below. Geeta Graves is c/o of     Chief Complaint   Patient presents with    Cough    Anxiety    Hypertension    Dementia       HPI:   Horace Oquendo is being seen for her regular monthly follow up while at [facility]. Overall patient has been doing pretty well. She has not had any major concerns over the last several months. She reports that she continues to have some chest tightness and a little bit of a cough but nothing that is normal. She also mentions that her sinuses continue to be a little bit congested, but again that is pretty much her baseline. She is still living on her own and she does not have a roommate at this point which I asked her if that bothers her and she said she actually does not really mind it. She probably will stay without a roommate at least for the short term due to the fact that there has not been a lot of new residents coming in with the pandemic. Otherwise she reports that she is sleeping well. She has not had any issues with feeling anxious or depressed recently. She also reports that her appetite has been excellent. She has not had any problems with swelling in her legs and no problems with chest pains or shortness of breath. Overall, she really feels that she has been about at her baseline.      Past Medical History:   Diagnosis Date    Arthritis     Chronic kidney disease     h/o mild renal insufficency    COPD (chronic obstructive pulmonary disease) (HCC)     Dyspnea     chronic    Hypertension     Hyponatremia     Left bundle branch block     chronic, old    Paranoia ([facility] [address]     paranoia ideation, delusions, phobic behavior in past, seems to wax and wane    Substance abuse (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 TC, RRT 4/20/18   Substance Use Topics    Alcohol use: No     Alcohol/week: 0.0 standard drinks      Prior to Visit Medications    Medication Sig Taking?  Authorizing Provider   mirtazapine (REMERON) 7.5 MG tablet   Historical Provider, MD   busPIRone (BUSPAR) 7.5 MG tablet   Historical Provider, MD   acetaminophen (TYLENOL) 325 MG tablet Take 650 mg by mouth 2 times daily  Historical Provider, MD   albuterol (PROVENTIL) (2.5 MG/3ML) 0.083% nebulizer solution Take 2.5 mg by nebulization 2 times daily  Historical Provider, MD   acetaminophen (TYLENOL) 500 MG tablet Take 500 mg by mouth every 4 hours as needed for Pain or Fever  Historical Provider, MD   benzonatate (TESSALON) 200 MG capsule Take 200 mg by mouth 3 times daily as needed for Cough  Historical Provider, MD   guaiFENesin (ROBITUSSIN) 100 MG/5ML syrup Take 200 mg by mouth 4 times daily as needed for Cough or Congestion  Historical Provider, MD   PARoxetine (PAXIL) 10 MG tablet Take 10 mg by mouth nightly  Historical Provider, MD   busPIRone (BUSPAR) 10 MG tablet Take 2 tablets by mouth 3 times daily  Patient taking differently: Take 7.5 mg by mouth nightly 10mg in morning, 7.5mg at night  Frankey Delaware, MD   lisinopril (PRINIVIL;ZESTRIL) 5 MG tablet Take 1 tablet by mouth daily  Rad Gill   metoprolol tartrate (LOPRESSOR) 25 MG tablet Take 1 tablet by mouth 2 times daily  Katalina Tesfaye   bethanechol (URECHOLINE) 25 MG tablet Take 1 tablet by mouth 4 times daily Josephine Earing   Multiple Vitamin (MULTI VITAMIN DAILY) TABS Take 1 tablet by mouth daily Take 1 tab po daily  Marco Esposito MD   fluticasone (FLONASE) 50 MCG/ACT nasal spray 1 spray by Nasal route daily  Marco Esposito MD   hydrochlorothiazide (MICROZIDE) 12.5 MG capsule take 1 capsule by mouth once daily  Marco Esposito MD   Calcium Carbonate (CALCIUM 600 PO) Take 600 mg by mouth 2 times daily  Historical Provider, MD   loratadine (CLARITIN) 10 MG capsule Take 1 capsule by mouth daily  Marco Esposito MD   isosorbide mononitrate (IMDUR) 30 MG extended release tablet Take 1 tablet by mouth daily  Macro Esposito MD   potassium chloride (KLOR-CON M) 20 MEQ extended release tablet Take 1 tablet by mouth 2 times daily  Marco Esposito MD   aspirin 81 MG chewable tablet Take 1 tablet by mouth daily  Marco Esposito MD   budesonide-formoterol (SYMBICORT) 80-4.5 MCG/ACT AERO Inhale 2 puffs into the lungs 2 times daily  Omaira Mccain MD   Cholecalciferol (VITAMIN D) 2000 UNITS CAPS capsule Take 4,000 Units by mouth daily  Historical Provider, MD   nitroGLYCERIN (NITROSTAT) 0.4 MG SL tablet Place 1 tablet under the tongue every 5 minutes as needed for Chest pain  Gian Marino MD   aluminum & magnesium hydroxide-simethicone (MAALOX REGULAR STRENGTH) 200-200-20 MG/5ML SUSP suspension Take 30 mLs by mouth every 6 hours as needed for Suzy Schirmer, MD   mirtazapine (REMERON) 15 MG tablet Take 0.5 tablets by mouth nightly  Marco Esposito MD   levothyroxine (SYNTHROID) 50 MCG tablet Take 1 tablet by mouth Daily  Marco Esposito MD   donepezil (ARICEPT) 10 MG tablet Take 10 mg by mouth daily  Historical Provider, MD   simethicone (MYLICON) 060 MG chewable tablet Take 125 mg by mouth every 6 hours as needed for Flatulence  Historical Provider, MD   OXYGEN Inhale 2 L/min into the lungs nightly.   Historical Provider, MD     Allergies   Allergen Reactions    Amlodipine Other (See Comments) Pt says she got all the side effects      Sulfa Antibiotics     Cleocin [Clindamycin] Rash    Macrolides And Ketolides Rash     \"All mycins\"    Pcn [Penicillins] Rash    Tetracyclines & Related Rash    Zithromax [Azithromycin] Rash       Subjective:      Review of Systems   Constitutional: Negative for activity change, appetite change, chills, fatigue and fever. HENT: Positive for sinus pressure. Respiratory: Positive for shortness of breath. Negative for cough, chest tightness and wheezing. Cardiovascular: Negative for chest pain, palpitations and leg swelling. Gastrointestinal: Negative for abdominal pain, constipation and diarrhea. Genitourinary: Negative for difficulty urinating. Skin: Negative for rash. Neurological: Positive for headaches. Negative for dizziness, syncope, weakness and light-headedness. Psychiatric/Behavioral: Positive for confusion and decreased concentration. Negative for behavioral problems, dysphoric mood and sleep disturbance. The patient is not nervous/anxious. Objective:     Physical Exam  Vitals and nursing note reviewed. Constitutional:       General: She is not in acute distress. Appearance: She is well-developed. Eyes:      Conjunctiva/sclera: Conjunctivae normal.   Neck:      Thyroid: No thyromegaly. Cardiovascular:      Rate and Rhythm: Normal rate and regular rhythm. Heart sounds: Normal heart sounds. No murmur heard. Pulmonary:      Effort: Pulmonary effort is normal. No respiratory distress. Breath sounds: Normal breath sounds. No wheezing. Abdominal:      General: Abdomen is flat. Bowel sounds are normal.      Palpations: Abdomen is soft. Tenderness: There is no abdominal tenderness. There is no guarding or rebound. Musculoskeletal:      Cervical back: Normal range of motion and neck supple. Lymphadenopathy:      Cervical: No cervical adenopathy. Skin:     General: Skin is warm and dry.       Findings: No erythema or rash. Neurological:      General: No focal deficit present. Mental Status: She is alert. Mental status is at baseline. Psychiatric:         Mood and Affect: Mood normal.         Speech: Speech normal.         Behavior: Behavior normal.         Cognition and Memory: Memory is impaired. Assessment:       Diagnosis Orders   1. Mucopurulent chronic bronchitis ([facility] [address]     2. Paranoia ([facility] [address]     3. Essential hypertension     4. Late onset Alzheimer's disease without behavioral disturbance ([facility] [address]               Plan:   1. COPD. Stable. She continues to have shortness of breath on a pretty regular basis with an occasional cough. She is still getting good relief from her albuterol and she continues to be on her daily inhalers as well. She has not really had any significant changes in the last year or so therefore I will continue her current medications and continue to monitor. 2. Paranoia. Stable. No new concerns with this recently. She actually has been doing pretty well and overall has been stable even with all of the different changes due to COVID, bought her room has been pretty consistent which does seem to help her. 3. Hypertension. Stable. No recent issues with high blood pressure or concerns for hypertensive urgency. I will continue her current medications and continue to monitor. 4. Dementia. Stable. No recent changes. She has not had any problems with wandering. No behavioral issues and overall seems pretty stable. Return in about 1 month (around 10/21/2021) for HTN follow up. Patient given educational materials - see patient instructions. Discussed use, benefit, and side effects of prescribed medications. All patient questions answered. Patient voiced understanding. Reviewed health maintenance. Instructed to continue current medications, diet and exercise. Patient agreed with treatment plan. Follow up as directed.          Aide NAJERA, am personally transcribing for Aleksandr Robbins MD 10/6/21 at 12:11 PM EDT. Rony Tipton MD, personally performed the services described in this document as transcribed by the , and it is both accurate and complete.     Electronically signed by Aleksandr Robbins MD on 10/7/21 at 1:21 PM EDT

## 2021-10-20 ASSESSMENT — ENCOUNTER SYMPTOMS
SHORTNESS OF BREATH: 1
DIARRHEA: 0
WHEEZING: 0
CONSTIPATION: 0
ABDOMINAL PAIN: 0
CHEST TIGHTNESS: 0
COUGH: 0

## 2021-10-20 NOTE — PROGRESS NOTES
[facility] [address]  [address] [address]  Dept: [phone]  Dept Fax: [phone]  Loc: [phone]    Sarah Sauceda  is a 80 y.o. female who presents today for her medical conditions/complaints as noted below. Sarah Sauceda is c/o of     Chief Complaint   Patient presents with    Anxiety    Hypertension    COPD    Headache       HPI:   Mykel Caba is being seen for her regular monthly follow up while at [facility]. She overall is doing fairly well. Of note, since I saw her the last time, she did develop a rash on her arms and abdomen most specifically on her right arm and her abdomen that was extremely itchy and pretty widespread across the entire arm. At the time it started nursing staff was not sure what would have been the cause as she had not exposures, no new medications, no new lotions, no new detergents, and oddly it was just on her right arm. They had called me letting me know so I stated her on a prednisone burst of 40mg daily for 5 days. I also advised them to use hydroxyzine at night for itching and she was given a steroid cream to use as well. Patient states that her rash feels significantly better overall, but she is still having some itching. At this point the rash mostly scabbed up on her arm and she does not see any lingering issues. She also is not sure what she could have come in contact with. Otherwise, she still continues to be short of breath, which has tightness worse with humidity and it is extremely humid today. She denies any problems with coughing. She is still having pretty regular headaches. No problems with abdominal pain, constipation, or diarrhea. Overall, she feels like her mood and have been doing pretty well. She continues to be paranoid that people are stealing things from her, but this really is not new and has not changed over the last year or so.  No new issues with joint pains. No swelling of the face, throat, or tongue associated with the rash. 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 TC, RRT 4/20/18   Substance Use Topics    Alcohol use: No     Alcohol/week: 0.0 standard drinks      Prior to Visit Medications    Medication Sig Taking?  Authorizing Provider   mirtazapine (REMERON) 7.5 MG tablet   Historical Provider, MD   busPIRone (BUSPAR) 7.5 MG tablet   Historical Provider, MD   acetaminophen (TYLENOL) 325 MG tablet Take 650 mg by mouth 2 times daily  Historical Provider, MD   albuterol (PROVENTIL) (2.5 MG/3ML) 0.083% nebulizer solution Take 2.5 mg by nebulization 2 times daily  Historical Provider, MD   acetaminophen (TYLENOL) 500 MG tablet Take 500 mg by mouth every 4 hours as needed for Pain or Fever  Historical Provider, MD   benzonatate (TESSALON) 200 MG capsule Take 200 mg by mouth 3 times daily as needed for Cough  Historical Provider, MD   guaiFENesin (ROBITUSSIN) 100 MG/5ML syrup Take 200 mg by mouth 4 times daily as needed for Cough or Congestion  Historical Provider, MD   PARoxetine (PAXIL) 10 MG tablet Take 10 mg by mouth nightly Historical Provider, MD   busPIRone (BUSPAR) 10 MG tablet Take 2 tablets by mouth 3 times daily  Patient taking differently: Take 7.5 mg by mouth nightly 10mg in morning, 7.5mg at night  Merton Bloch, MD   lisinopril (PRINIVIL;ZESTRIL) 5 MG tablet Take 1 tablet by mouth daily  Heriberto Potter   metoprolol tartrate (LOPRESSOR) 25 MG tablet Take 1 tablet by mouth 2 times daily  Saman Tesfaye   bethanechol (URECHOLINE) 25 MG tablet Take 1 tablet by mouth 4 times daily  Heriberto Potter   Multiple Vitamin (MULTI VITAMIN DAILY) TABS Take 1 tablet by mouth daily Take 1 tab po daily  Kenya Never, MD   fluticasone (FLONASE) 50 MCG/ACT nasal spray 1 spray by Nasal route daily  Kenya Never, MD   hydrochlorothiazide (MICROZIDE) 12.5 MG capsule take 1 capsule by mouth once daily  Kenya Never, MD   Calcium Carbonate (CALCIUM 600 PO) Take 600 mg by mouth 2 times daily  Historical Provider, MD   loratadine (CLARITIN) 10 MG capsule Take 1 capsule by mouth daily  Kenya Never, MD   isosorbide mononitrate (IMDUR) 30 MG extended release tablet Take 1 tablet by mouth daily  Kenya Never, MD   potassium chloride (KLOR-CON M) 20 MEQ extended release tablet Take 1 tablet by mouth 2 times daily  Kenya Never, MD   aspirin 81 MG chewable tablet Take 1 tablet by mouth daily  Kenya Never, MD   budesonide-formoterol (SYMBICORT) 80-4.5 MCG/ACT AERO Inhale 2 puffs into the lungs 2 times daily  Thuan Oswald MD   Cholecalciferol (VITAMIN D) 2000 UNITS CAPS capsule Take 4,000 Units by mouth daily  Historical Provider, MD   nitroGLYCERIN (NITROSTAT) 0.4 MG SL tablet Place 1 tablet under the tongue every 5 minutes as needed for Chest pain  Carolina Deluna MD   aluminum & magnesium hydroxide-simethicone (MAALOX REGULAR STRENGTH) 200-200-20 MG/5ML SUSP suspension Take 30 mLs by mouth every 6 hours as needed for Ziyad Gardner, MD   mirtazapine (REMERON) 15 MG tablet Take 0.5 tablets by mouth nightly Cresencio Tilley MD   levothyroxine (SYNTHROID) 50 MCG tablet Take 1 tablet by mouth Daily  Cresencio Tilley MD   donepezil (ARICEPT) 10 MG tablet Take 10 mg by mouth daily  Historical Provider, MD   simethicone (MYLICON) 661 MG chewable tablet Take 125 mg by mouth every 6 hours as needed for Flatulence  Historical Provider, MD   OXYGEN Inhale 2 L/min into the lungs nightly. Historical Provider, MD     Allergies   Allergen Reactions    Amlodipine Other (See Comments)     Pt says she got all the side effects      Sulfa Antibiotics     Cleocin [Clindamycin] Rash    Macrolides And Ketolides Rash     \"All mycins\"    Pcn [Penicillins] Rash    Tetracyclines & Related Rash    Zithromax [Azithromycin] Rash       Subjective:      Review of Systems   Constitutional: Negative for activity change, appetite change, chills, fatigue and fever. Respiratory: Positive for shortness of breath. Negative for cough, chest tightness and wheezing. Cardiovascular: Negative for chest pain, palpitations and leg swelling. Gastrointestinal: Negative for abdominal pain, constipation and diarrhea. Genitourinary: Negative for difficulty urinating. Skin: Positive for rash. Neurological: Positive for headaches. Negative for dizziness, syncope, weakness and light-headedness. Psychiatric/Behavioral: Positive for confusion and decreased concentration. Negative for behavioral problems, dysphoric mood and sleep disturbance. The patient is nervous/anxious. Objective:     Physical Exam  Vitals and nursing note reviewed. Constitutional:       General: She is not in acute distress. Appearance: She is well-developed. Eyes:      Conjunctiva/sclera: Conjunctivae normal.   Neck:      Thyroid: No thyromegaly. Cardiovascular:      Rate and Rhythm: Normal rate and regular rhythm. Heart sounds: Normal heart sounds. No murmur heard. Pulmonary:      Effort: Pulmonary effort is normal. No respiratory distress. Breath sounds: Normal breath sounds. No wheezing. Abdominal:      General: Abdomen is flat. Bowel sounds are normal.      Palpations: Abdomen is soft. Tenderness: There is no abdominal tenderness. There is no guarding or rebound. Musculoskeletal:      Cervical back: Normal range of motion and neck supple. Lymphadenopathy:      Cervical: No cervical adenopathy. Skin:     General: Skin is warm and dry. Findings: No erythema or rash. Comments: Healing scabbed rash from right hand to right shoulder. Some healing rash across abdomen as well. Neurological:      General: No focal deficit present. Mental Status: She is alert. Mental status is at baseline. Psychiatric:         Mood and Affect: Mood normal.         Speech: Speech normal.         Behavior: Behavior normal.         Cognition and Memory: Memory is impaired. Assessment:       Diagnosis Orders   1. Mucopurulent chronic bronchitis ([facility] [address]     2. Paranoia ([facility] [address]     3. Essential hypertension     4. Late onset Alzheimer's disease without behavioral disturbance ([facility] [address]     5. Dermatitis               Plan:   1. COPD. Stable. She continues to have chest tightness and shortness of breath but nothing that has really changed recently. She did feel a little bit better with prednisone. 2. Paranoia. Stable. She continues to be sure that people are stealing from her but no major changes recently and no behavioral changes associated with it. 3. Hypertension. Stable. Blood pressure has been well controlled. Therefore, I will continue her current medications. 4. Dementia. Stable. No recent changes. She continues to be paranoid as above but no exit seeking behavior or behavioral disturbances. 5. Rash. Improving. It is unclear what she got into initially, but it does look like it is getting a little bit better. It is still somewhat itchy so I will extend her Prednisone to 20 mg daily for another five days.         Return in about 1 month

## 2021-11-02 RX ORDER — CIPROFLOXACIN 500 MG/1
500 TABLET, FILM COATED ORAL 2 TIMES DAILY
Qty: 14 TABLET | Refills: 0 | OUTPATIENT
Start: 2021-11-02 | End: 2022-05-11

## 2021-11-07 ENCOUNTER — OUTSIDE SERVICES (OUTPATIENT)
Dept: FAMILY MEDICINE CLINIC | Age: 86
End: 2021-11-07
Payer: MEDICARE

## 2021-11-07 DIAGNOSIS — J41.1 MUCOPURULENT CHRONIC BRONCHITIS (HCC): Primary | ICD-10-CM

## 2021-11-07 DIAGNOSIS — F02.80 LATE ONSET ALZHEIMER'S DISEASE WITHOUT BEHAVIORAL DISTURBANCE (HCC): ICD-10-CM

## 2021-11-07 DIAGNOSIS — F22 PARANOIA (HCC): ICD-10-CM

## 2021-11-07 DIAGNOSIS — N30.00 ACUTE CYSTITIS WITHOUT HEMATURIA: ICD-10-CM

## 2021-11-07 DIAGNOSIS — I10 ESSENTIAL HYPERTENSION: ICD-10-CM

## 2021-11-07 DIAGNOSIS — G30.1 LATE ONSET ALZHEIMER'S DISEASE WITHOUT BEHAVIORAL DISTURBANCE (HCC): ICD-10-CM

## 2021-11-07 PROCEDURE — 99309 SBSQ NF CARE MODERATE MDM 30: CPT | Performed by: FAMILY MEDICINE

## 2021-11-09 LAB
BUN BLDV-MCNC: 12 MG/DL
CALCIUM SERPL-MCNC: 9.6 MG/DL
CHLORIDE BLD-SCNC: 102 MMOL/L
CO2: 31 MMOL/L
CREAT SERPL-MCNC: 0.8 MG/DL
GFR CALCULATED: NORMAL
GLUCOSE BLD-MCNC: 79 MG/DL
POTASSIUM SERPL-SCNC: 4.9 MMOL/L
SODIUM BLD-SCNC: 140 MMOL/L

## 2021-11-22 ASSESSMENT — ENCOUNTER SYMPTOMS
SINUS PAIN: 1
WHEEZING: 0
CONSTIPATION: 0
ABDOMINAL PAIN: 0
CHEST TIGHTNESS: 0
DIARRHEA: 0
COUGH: 0
SHORTNESS OF BREATH: 1
SINUS PRESSURE: 1

## 2021-11-22 NOTE — PROGRESS NOTES
[facility] [address]  [address] [address]  Dept: [phone]  Dept Fax: [phone]  Loc: [phone]    Taniya Kidd  is a 80 y.o. female who presents today for her medical conditions/complaints as noted below. Taniya Kidd is c/o of     Chief Complaint   Patient presents with    Cough    Anxiety    Urinary Tract Infection    Dementia    Hypertension       HPI:   Shoaib Chao is being seen for her regular monthly follow up while at [facility]. Overall patient has been doing pretty well. In by last couple months she did have an episode of dermatitis that did cause a rash all over, that resolved with Prednisone and some topical steroids. She also had a UTI last week but that seems to have resolved as she is not having any increased falls and no recent increased confusion. Her UTI was initially found when she did have a fall in the bathroom but since then she has been doing quite well. She still says that she is short of breath but that is pretty much baseline for her. She has not had any increased cough. She still complains of sinus pressure, some pain, and some mild headaches but that is very stable for her and she does mention that whenever she brings up those symptoms. She continues to have an excellent appetite. No problems with her bowels. No problems with constipation or diarrhea. She generally feels pretty good. She does have a new roommate and she feels like things are going pretty well overall. Really today she did not have any new concerns today. I did have a care conference with her guardian last week and he felt things were going well and he did not have any concerns about her either, therefore at this point I do not think I am going to make any changes other than having her finish her antibiotic.        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 TC, RRT 4/20/18   Substance Use Topics    Alcohol use: No     Alcohol/week: 0.0 standard drinks      Prior to Visit Medications    Medication Sig Taking?  Authorizing Provider   ciprofloxacin (CIPRO) 500 MG tablet Take 1 tablet by mouth 2 times daily  Karen Rodrigez MD   mirtazapine (REMERON) 7.5 MG tablet   Historical Provider, MD   busPIRone (BUSPAR) 7.5 MG tablet   Historical Provider, MD   acetaminophen (TYLENOL) 325 MG tablet Take 650 mg by mouth 2 times daily  Historical Provider, MD   albuterol (PROVENTIL) (2.5 MG/3ML) 0.083% nebulizer solution Take 2.5 mg by nebulization 2 times daily  Historical Provider, MD   acetaminophen (TYLENOL) 500 MG tablet Take 500 mg by mouth every 4 hours as needed for Pain or Fever  Historical Provider, MD   benzonatate (TESSALON) 200 MG capsule Take 200 mg by mouth 3 times daily as needed for Cough  Historical Provider, MD   guaiFENesin (ROBITUSSIN) 100 MG/5ML syrup Take 200 mg by mouth 4 times daily as needed for Cough or Congestion  Historical Provider, MD   PARoxetine (PAXIL) 10 MG tablet Take 10 mg by mouth nightly  Historical Provider, MD   busPIRone (BUSPAR) 10 MG tablet Take 2 tablets by mouth 3 times daily  Patient taking differently: Take 7.5 mg by mouth nightly 10mg in morning, 7.5mg at night  María Dutton MD   lisinopril (PRINIVIL;ZESTRIL) 5 MG tablet Take 1 tablet by mouth daily  Carmela Rivera   metoprolol tartrate (LOPRESSOR) 25 MG tablet Take 1 tablet by mouth 2 times daily  Chidi Tesfaye   bethanechol (URECHOLINE) 25 MG tablet Take 1 tablet by mouth 4 times daily  Carmela Rivera   Multiple Vitamin (MULTI VITAMIN DAILY) TABS Take 1 tablet by mouth daily Take 1 tab po daily  Hemant Real MD   fluticasone (FLONASE) 50 MCG/ACT nasal spray 1 spray by Nasal route daily  Hemant Real MD   hydrochlorothiazide (MICROZIDE) 12.5 MG capsule take 1 capsule by mouth once daily  Hemant Real MD   Calcium Carbonate (CALCIUM 600 PO) Take 600 mg by mouth 2 times daily  Historical Provider, MD   loratadine (CLARITIN) 10 MG capsule Take 1 capsule by mouth daily  Hemant Real MD   isosorbide mononitrate (IMDUR) 30 MG extended release tablet Take 1 tablet by mouth daily  Hemant Real MD   potassium chloride (KLOR-CON M) 20 MEQ extended release tablet Take 1 tablet by mouth 2 times daily  Hemant Real MD   aspirin 81 MG chewable tablet Take 1 tablet by mouth daily  Hemant Real MD   budesonide-formoterol (SYMBICORT) 80-4.5 MCG/ACT AERO Inhale 2 puffs into the lungs 2 times daily  Raquel Rudolph MD   Cholecalciferol (VITAMIN D) 2000 UNITS CAPS capsule Take 4,000 Units by mouth daily  Historical Provider, MD   nitroGLYCERIN (NITROSTAT) 0.4 MG SL tablet Place 1 tablet under the tongue every 5 minutes as needed for Chest pain  Sandra Gonzalez MD   aluminum & magnesium hydroxide-simethicone (MAALOX REGULAR STRENGTH) 200-200-20 MG/5ML SUSP suspension Take 30 mLs by mouth every 6 hours as needed for Ian Valdivia MD   mirtazapine (REMERON) 15 MG tablet Take 0.5 tablets by mouth nightly  Hemant Real MD   levothyroxine (SYNTHROID) 50 MCG tablet Take 1 tablet by mouth Daily Josh Abreu MD   donepezil (ARICEPT) 10 MG tablet Take 10 mg by mouth daily  Historical Provider, MD   simethicone (MYLICON) 457 MG chewable tablet Take 125 mg by mouth every 6 hours as needed for Flatulence  Historical Provider, MD   OXYGEN Inhale 2 L/min into the lungs nightly. Historical Provider, MD     Allergies   Allergen Reactions    Amlodipine Other (See Comments)     Pt says she got all the side effects      Sulfa Antibiotics     Cleocin [Clindamycin] Rash    Macrolides And Ketolides Rash     \"All mycins\"    Pcn [Penicillins] Rash    Tetracyclines & Related Rash    Zithromax [Azithromycin] Rash       Subjective:      Review of Systems   Constitutional: Negative for activity change, appetite change, chills, fatigue and fever. HENT: Positive for congestion, sinus pressure and sinus pain. Respiratory: Positive for shortness of breath. Negative for cough, chest tightness and wheezing. Cardiovascular: Negative for chest pain, palpitations and leg swelling. Gastrointestinal: Negative for abdominal pain, constipation and diarrhea. Genitourinary: Negative for difficulty urinating. Skin: Negative for rash. Neurological: Negative for dizziness, syncope, weakness, light-headedness and headaches. Psychiatric/Behavioral: Positive for confusion and decreased concentration. Negative for behavioral problems, dysphoric mood and sleep disturbance. The patient is not nervous/anxious. Objective:     Physical Exam  Vitals and nursing note reviewed. Constitutional:       General: She is not in acute distress. Appearance: She is well-developed. Eyes:      Conjunctiva/sclera: Conjunctivae normal.   Neck:      Thyroid: No thyromegaly. Cardiovascular:      Rate and Rhythm: Normal rate and regular rhythm. Heart sounds: Normal heart sounds. No murmur heard. Pulmonary:      Effort: Pulmonary effort is normal. No respiratory distress. Breath sounds: Normal breath sounds.  No wheezing. Abdominal:      General: Abdomen is flat. Bowel sounds are normal.      Palpations: Abdomen is soft. Tenderness: There is no abdominal tenderness. There is no guarding or rebound. Musculoskeletal:      Cervical back: Normal range of motion and neck supple. Lymphadenopathy:      Cervical: No cervical adenopathy. Skin:     General: Skin is warm and dry. Findings: No erythema or rash. Neurological:      General: No focal deficit present. Mental Status: She is alert. Mental status is at baseline. Psychiatric:         Mood and Affect: Mood normal.         Speech: Speech normal.         Behavior: Behavior normal.         Cognition and Memory: Memory is impaired. Assessment:       Diagnosis Orders   1. Mucopurulent chronic bronchitis ([facility] [address]     2. Paranoia ([facility] [address]     3. Essential hypertension     4. Late onset Alzheimer's disease without behavioral disturbance ([facility] [address]     5. Acute cystitis without hematuria               Plan:   1. COPD. Stable. No recent issues or changes. She continues to be short of breath at baseline but she is not wheezing and she overall has been doing very well. I will continue to monitor. 2. Paranoia. Stable. It was a lot worse when she had her bladder infection last week but since getting that fixed she has been doing much better. She continues to think certain people have stolen her car or her clothing but that really is pretty much at baseline and has not changed much at all in the last several years. 3. Hypertension. Stable. No recent changes in her blood pressure. She overall has been feeling pretty good. 4. Dementia. Stable. No reason issues or changes in her mental status and she generally has been not combative, very cooperative, and pretty much the same. 5. Acute cystitis. Improving. She seems to be doing well since starting Cipro. I will have her finish the course of Cipro and continue to monitor.         Return in about 1 month (around 12/7/2021) for HTN follow up, COPD follow up. Patient given educational materials - see patient instructions. Discussed use, benefit, and side effects of prescribed medications. All patient questions answered. Patient voiced understanding. Reviewed health maintenance. Instructed to continue current medications, diet and exercise. Patient agreed with treatment plan. Follow up as directed. Darinel Bergman am personally transcribing for Tomas Dillard MD 11/22/21 at 11:09 AM IOANA. Urban Dawson MD, personally performed the services described in this document as transcribed by the , and it is both accurate and complete.     Electronically signed by Tomas Dillard MD on 11/22/21 at 12:27 PM EST

## 2021-12-01 ENCOUNTER — OUTSIDE SERVICES (OUTPATIENT)
Dept: FAMILY MEDICINE CLINIC | Age: 86
End: 2021-12-01
Payer: MEDICARE

## 2021-12-01 DIAGNOSIS — J41.1 MUCOPURULENT CHRONIC BRONCHITIS (HCC): Primary | ICD-10-CM

## 2021-12-01 DIAGNOSIS — F22 PARANOIA (HCC): ICD-10-CM

## 2021-12-01 DIAGNOSIS — I10 ESSENTIAL HYPERTENSION: ICD-10-CM

## 2021-12-01 DIAGNOSIS — F02.80 LATE ONSET ALZHEIMER'S DISEASE WITHOUT BEHAVIORAL DISTURBANCE (HCC): ICD-10-CM

## 2021-12-01 DIAGNOSIS — G30.1 LATE ONSET ALZHEIMER'S DISEASE WITHOUT BEHAVIORAL DISTURBANCE (HCC): ICD-10-CM

## 2021-12-01 PROCEDURE — 99309 SBSQ NF CARE MODERATE MDM 30: CPT | Performed by: FAMILY MEDICINE

## 2021-12-08 ASSESSMENT — ENCOUNTER SYMPTOMS
CONSTIPATION: 0
ABDOMINAL PAIN: 0
WHEEZING: 0
COUGH: 0
CHEST TIGHTNESS: 0
SHORTNESS OF BREATH: 1
DIARRHEA: 0

## 2021-12-08 NOTE — PROGRESS NOTES
[facility]  [address] [address]  Dept: [phone]  Dept Fax: [phone]  Loc: [phone]    Taniya Kidd  is a 80 y.o. female who presents today for her medical conditions/complaints as noted below. Taniya Kidd is c/o of     Chief Complaint   Patient presents with    Cough    Anxiety    Hypertension    Dementia       HPI:   Shoaib Chao is being seen for her regular monthly follow up while at [facility]. Overall, patient has been doing very well. She does not really have any concerns or complaints this time that are different than normal. She continues to feel short of breath at baseline. She continues to have chronic sinus congestion. She also continues to have chronic headaches. All of this is very unchanged for the last 5 years. She does not report any increased cough and no sputum production. She had a rash about six weeks ago that is completely resolved. She also had a UTI a couple of weeks ago that has also resolved. Overall, her appetite has been good, and she is eating well. She really has not had any issues and she is happy with how things are going. No problems with abdominal pain, constipation, or diarrhea. No swelling in her legs and no chest pains.      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 TC, RRT 4/20/18   Substance Use Topics    Alcohol use: No     Alcohol/week: 0.0 standard drinks      Prior to Visit Medications    Medication Sig Taking?  Authorizing Provider   ciprofloxacin (CIPRO) 500 MG tablet Take 1 tablet by mouth 2 times daily  Brijesh Jacinto MD   mirtazapine (REMERON) 7.5 MG tablet   Historical Provider, MD   busPIRone (BUSPAR) 7.5 MG tablet   Historical Provider, MD   acetaminophen (TYLENOL) 325 MG tablet Take 650 mg by mouth 2 times daily  Historical Provider, MD   albuterol (PROVENTIL) (2.5 MG/3ML) 0.083% nebulizer solution Take 2.5 mg by nebulization 2 times daily  Historical Provider, MD   acetaminophen (TYLENOL) 500 MG tablet Take 500 mg by mouth every 4 hours as needed for Pain or Fever  Historical Provider, MD   benzonatate (TESSALON) 200 MG capsule Take 200 mg by mouth 3 times daily as needed for Cough  Historical Provider, MD   guaiFENesin (ROBITUSSIN) 100 MG/5ML syrup Take 200 mg by mouth 4 times daily as needed for Cough or Congestion  Historical Provider, MD   PARoxetine (PAXIL) 10 MG tablet Take 10 mg by mouth nightly  Historical Provider, MD   busPIRone (BUSPAR) 10 MG tablet Take 2 tablets by mouth 3 times daily  Patient taking differently: Take 7.5 mg by mouth nightly 10mg in morning, 7.5mg at night  Brijesh Jacinto MD   lisinopril (PRINIVIL;ZESTRIL) 5 MG tablet Take 1 tablet by mouth daily  Render Michael   metoprolol tartrate (LOPRESSOR) 25 MG tablet Take 1 tablet by mouth 2 times daily  Felicitas Tesfaye   bethanechol (URECHOLINE) 25 MG tablet Take 1 tablet by mouth 4 times daily  Render Michael   Multiple Vitamin (MULTI VITAMIN DAILY) TABS Take 1 tablet by mouth daily Take 1 tab po daily  Arcadio Desai MD   fluticasone (FLONASE) 50 MCG/ACT nasal spray 1 spray by Nasal route daily  Derrick Hammond MD   hydrochlorothiazide (MICROZIDE) 12.5 MG capsule take 1 capsule by mouth once daily  Derrick Hammond MD   Calcium Carbonate (CALCIUM 600 PO) Take 600 mg by mouth 2 times daily  Historical Provider, MD   loratadine (CLARITIN) 10 MG capsule Take 1 capsule by mouth daily  Derrick Hammond MD   isosorbide mononitrate (IMDUR) 30 MG extended release tablet Take 1 tablet by mouth daily  Derrick Hammond MD   potassium chloride (KLOR-CON M) 20 MEQ extended release tablet Take 1 tablet by mouth 2 times daily  Derrick Hammond MD   aspirin 81 MG chewable tablet Take 1 tablet by mouth daily  Derrick Hammond MD   budesonide-formoterol (SYMBICORT) 80-4.5 MCG/ACT AERO Inhale 2 puffs into the lungs 2 times daily  Daquan Mcguire MD   Cholecalciferol (VITAMIN D) 2000 UNITS CAPS capsule Take 4,000 Units by mouth daily  Historical Provider, MD   nitroGLYCERIN (NITROSTAT) 0.4 MG SL tablet Place 1 tablet under the tongue every 5 minutes as needed for Chest pain  Mello Natarajan MD   aluminum & magnesium hydroxide-simethicone (MAALOX REGULAR STRENGTH) 200-200-20 MG/5ML SUSP suspension Take 30 mLs by mouth every 6 hours as needed for Fernando MD Bob   mirtazapine (REMERON) 15 MG tablet Take 0.5 tablets by mouth nightly  Derrick Hammond MD   levothyroxine (SYNTHROID) 50 MCG tablet Take 1 tablet by mouth Daily  Derrick Hammond MD   donepezil (ARICEPT) 10 MG tablet Take 10 mg by mouth daily  Historical Provider, MD   simethicone (MYLICON) 944 MG chewable tablet Take 125 mg by mouth every 6 hours as needed for Flatulence  Historical Provider, MD   OXYGEN Inhale 2 L/min into the lungs nightly.   Historical Provider, MD     Allergies   Allergen Reactions    Amlodipine Other (See Comments)     Pt says she got all the side effects      Sulfa Antibiotics     Cleocin [Clindamycin] Rash    Macrolides And Ketolides Rash     \"All mycins\"    Pcn [Penicillins] Rash    Tetracyclines & Related Rash    Zithromax [Azithromycin] Rash       Subjective:      Review of Systems   Constitutional: Negative for activity change, appetite change, chills, fatigue and fever. HENT: Positive for congestion. Respiratory: Positive for shortness of breath. Negative for cough, chest tightness and wheezing. Cardiovascular: Negative for chest pain, palpitations and leg swelling. Gastrointestinal: Negative for abdominal pain, constipation and diarrhea. Genitourinary: Negative for difficulty urinating. Skin: Negative for rash. Neurological: Negative for dizziness, syncope, weakness, light-headedness and headaches. Psychiatric/Behavioral: Positive for confusion and decreased concentration. Negative for behavioral problems, dysphoric mood and sleep disturbance. The patient is nervous/anxious. Objective:     Physical Exam  Vitals and nursing note reviewed. Constitutional:       General: She is not in acute distress. Appearance: She is well-developed. Eyes:      Conjunctiva/sclera: Conjunctivae normal.   Neck:      Thyroid: No thyromegaly. Cardiovascular:      Rate and Rhythm: Normal rate and regular rhythm. Heart sounds: Normal heart sounds. No murmur heard. Pulmonary:      Effort: Pulmonary effort is normal. No respiratory distress. Breath sounds: Normal breath sounds. No wheezing. Abdominal:      General: Abdomen is flat. Bowel sounds are normal.      Palpations: Abdomen is soft. Tenderness: There is no abdominal tenderness. There is no guarding or rebound. Musculoskeletal:      Cervical back: Normal range of motion and neck supple. Lymphadenopathy:      Cervical: No cervical adenopathy. Skin:     General: Skin is warm and dry. Findings: No erythema or rash. Neurological:      General: No focal deficit present. Mental Status: She is alert. Mental status is at baseline.    Psychiatric:         Mood and Affect: Mood normal.         Speech: Speech normal.         Behavior: Behavior normal.         Cognition and Memory: Memory is impaired. Assessment:       Diagnosis Orders   1. Mucopurulent chronic bronchitis ([facility] [address]     2. Paranoia ([facility] [address]     3. Essential hypertension     4. Late onset Alzheimer's disease without behavioral disturbance ([facility] [address]               Plan:   1. COPD. Stable. Her lungs sound clear today. No issues with wheezing. She is doing well with her Duoneb treatment as needed and generally has been pretty stable over the last several years. 2. Paranoia. Stable. No recent issues with increased anxiety. She has been doing well overall. 3. Hypertension. Stable. No recent issues with her blood pressure. She has not had any hypertensive urgency in quite a long time. I will continue her current dose of medication. 4. Dementia. Stable. She has periods of thinking that people are stealing from her but that really has not changed much in the last couple of years and it is pretty consistent things that she thinks are being stolen and it is pretty consistent on who she thinks stole them. at this point she is not having any behavioral disturbances and she is not a harm to herself or others. I will continue her current medications. I will continue to monitor. Return in about 1 month (around 1/1/2022) for HTN follow up, COPD follow up. Patient given educational materials - see patient instructions. Discussed use, benefit, and side effects of prescribed medications. All patient questions answered. Patient voiced understanding. Reviewed health maintenance. Instructed to continue current medications, diet and exercise. Patient agreed with treatment plan. Follow up as directed. Callie Penny am personally transcribing for Cris Fortune MD 12/8/21 at 2:24 PM IOANA.         Chin Jackson MD, personally performed the services described in this document as transcribed by the , and it is both accurate and complete.     Electronically signed by Ramon Schumacher MD on 12/9/21 at 5:32 PM EST

## 2022-01-11 ENCOUNTER — OUTSIDE SERVICES (OUTPATIENT)
Dept: FAMILY MEDICINE CLINIC | Age: 87
End: 2022-01-11
Payer: MEDICARE

## 2022-01-11 DIAGNOSIS — G30.1 LATE ONSET ALZHEIMER'S DISEASE WITHOUT BEHAVIORAL DISTURBANCE (HCC): ICD-10-CM

## 2022-01-11 DIAGNOSIS — F02.80 LATE ONSET ALZHEIMER'S DISEASE WITHOUT BEHAVIORAL DISTURBANCE (HCC): ICD-10-CM

## 2022-01-11 DIAGNOSIS — J41.1 MUCOPURULENT CHRONIC BRONCHITIS (HCC): Primary | ICD-10-CM

## 2022-01-11 DIAGNOSIS — I10 ESSENTIAL HYPERTENSION: ICD-10-CM

## 2022-01-11 DIAGNOSIS — F22 PARANOIA (HCC): ICD-10-CM

## 2022-01-11 PROCEDURE — 99309 SBSQ NF CARE MODERATE MDM 30: CPT | Performed by: FAMILY MEDICINE

## 2022-01-24 ASSESSMENT — ENCOUNTER SYMPTOMS
CONSTIPATION: 0
SHORTNESS OF BREATH: 0
CHEST TIGHTNESS: 0
ABDOMINAL PAIN: 0
COUGH: 0
DIARRHEA: 0
WHEEZING: 0

## 2022-02-08 ENCOUNTER — TELEPHONE (OUTPATIENT)
Dept: FAMILY MEDICINE CLINIC | Age: 87
End: 2022-02-08

## 2022-02-22 ENCOUNTER — OUTSIDE SERVICES (OUTPATIENT)
Dept: FAMILY MEDICINE CLINIC | Age: 87
End: 2022-02-22
Payer: MEDICARE

## 2022-02-22 DIAGNOSIS — F02.80 LATE ONSET ALZHEIMER'S DISEASE WITHOUT BEHAVIORAL DISTURBANCE (HCC): ICD-10-CM

## 2022-02-22 DIAGNOSIS — F22 PARANOIA (HCC): ICD-10-CM

## 2022-02-22 DIAGNOSIS — J41.1 MUCOPURULENT CHRONIC BRONCHITIS (HCC): Primary | ICD-10-CM

## 2022-02-22 DIAGNOSIS — L30.9 DERMATITIS: ICD-10-CM

## 2022-02-22 DIAGNOSIS — G30.1 LATE ONSET ALZHEIMER'S DISEASE WITHOUT BEHAVIORAL DISTURBANCE (HCC): ICD-10-CM

## 2022-02-22 DIAGNOSIS — J32.4 CHRONIC PANSINUSITIS: ICD-10-CM

## 2022-02-22 DIAGNOSIS — I10 ESSENTIAL HYPERTENSION: ICD-10-CM

## 2022-02-22 PROCEDURE — 99309 SBSQ NF CARE MODERATE MDM 30: CPT | Performed by: FAMILY MEDICINE

## 2022-03-04 ASSESSMENT — ENCOUNTER SYMPTOMS
COUGH: 0
ABDOMINAL PAIN: 0
SHORTNESS OF BREATH: 0
CONSTIPATION: 0
DIARRHEA: 0
CHEST TIGHTNESS: 0
WHEEZING: 0

## 2022-03-04 NOTE — PROGRESS NOTES
[facility] [address] [place] [address]  Dept: [phone]  Dept Fax: [phone]  Loc: [phone]    Florencio Whalen  is a 80 y.o. female who presents today for her medical conditions/complaints as noted below. Florencio Whalen is c/o of     Chief Complaint   Patient presents with    Cough    Hypertension    Sinus Problem    Anxiety    Dementia       HPI:   Galileo Coates is being seen for her regular monthly follow up while at [facility]. Overall, patient has been doing fairly well. The only major change that she has had in the last month is that she has developed this really bad rash. I treated her with a Prednisone taper a couple of weeks ago and nursing staff had notified me that she had a rash but at that time I had not seen it. Since then it appears that the rash has not improved. It is somewhat scabbed over, but she is still extremely itchy and she continues to scratch it regularly. I looked and the rash is covering her entire body. She has the rash on her arms, legs, belly, back, and neck with very little on her face. She has not had any fevers and no signs of infection she is just extremely itchy. Otherwise, she feels about the same. She has not had any problems with increased cough. She is always short of breath but no changes recently. She has chronic sinus pain but no changes recently. She has not had a sore throat. No nasal congestion. No problems with abdominal pain, constipation, or diarrhea. She continues to enjoy doing activities like playing bingo and she has not had any recent changes in her dementia. She continues to be somewhat confused. She regularly forgets that she lives at [facility] and mentions that she just visits periodically. Although, she does always remember that I have a son and asks about him every time I see her. No recent changes in behaviors.  No threats of harm to herself or others. No issues with wandering. 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 TC, RRT 4/20/18   Substance Use Topics    Alcohol use: No     Alcohol/week: 0.0 standard drinks      Prior to Visit Medications    Medication Sig Taking?  Authorizing Provider   ciprofloxacin (CIPRO) 500 MG tablet Take 1 tablet by mouth 2 times daily  [place] MD Andrew   mirtazapine (REMERON) 7.5 MG tablet   Historical Provider, MD   busPIRone (BUSPAR) 7.5 MG tablet   Historical Provider, MD   acetaminophen (TYLENOL) 325 MG tablet Take 650 mg by mouth 2 times daily  Historical Provider, MD   albuterol (PROVENTIL) (2.5 MG/3ML) 0.083% nebulizer solution Take 2.5 mg by nebulization 2 times daily  Historical Provider, MD   acetaminophen (TYLENOL) 500 MG tablet Take 500 mg by mouth every 4 hours as needed for Pain or Fever  Historical Provider, MD   benzonatate (TESSALON) 200 MG capsule Take 200 mg by mouth 3 times daily as needed for Cough  Historical Provider, MD   guaiFENesin (ROBITUSSIN) 100 MG/5ML syrup Take 200 mg by mouth 4 times daily as needed for Cough or Congestion  Historical Provider, MD   PARoxetine (PAXIL) 10 MG tablet Take 10 mg by mouth nightly  Historical Provider, MD   busPIRone (BUSPAR) 10 MG tablet Take 2 tablets by mouth 3 times daily  Patient taking differently: Take 7.5 mg by mouth nightly 10mg in morning, 7.5mg at night  Chapincito Taylor MD   lisinopril (PRINIVIL;ZESTRIL) 5 MG tablet Take 1 tablet by mouth daily  Radah Lowery   metoprolol tartrate (LOPRESSOR) 25 MG tablet Take 1 tablet by mouth 2 times daily  Callie Tesfaye   bethanechol (URECHOLINE) 25 MG tablet Take 1 tablet by mouth 4 times daily  Radha Lowery   Multiple Vitamin (MULTI VITAMIN DAILY) TABS Take 1 tablet by mouth daily Take 1 tab po daily  Joy Coronado MD   fluticasone (FLONASE) 50 MCG/ACT nasal spray 1 spray by Nasal route daily  Joy Coronado MD   hydrochlorothiazide (MICROZIDE) 12.5 MG capsule take 1 capsule by mouth once daily  Joy Coronado MD   Calcium Carbonate (CALCIUM 600 PO) Take 600 mg by mouth 2 times daily  Historical Provider, MD   loratadine (CLARITIN) 10 MG capsule Take 1 capsule by mouth daily  Joy Coronado MD   isosorbide mononitrate (IMDUR) 30 MG extended release tablet Take 1 tablet by mouth daily  Joy Coronado MD   potassium chloride (KLOR-CON M) 20 MEQ extended release tablet Take 1 tablet by mouth 2 times daily  Joy Coronado MD   aspirin 81 MG chewable tablet Take 1 tablet by mouth daily  Joy Coronado MD   budesonide-formoterol (SYMBICORT) 80-4.5 MCG/ACT AERO Inhale 2 puffs into the lungs 2 times daily  Oly Rodriguez MD   Cholecalciferol (VITAMIN D) 2000 UNITS CAPS capsule Take 4,000 Units by mouth daily  Historical Provider, MD   nitroGLYCERIN (NITROSTAT) 0.4 MG SL tablet Place 1 tablet under the tongue every 5 minutes as needed for Chest pain  Geovanny Anaya MD   aluminum & magnesium hydroxide-simethicone (MAALOX REGULAR STRENGTH) 200-200-20 MG/5ML SUSP suspension Take 30 mLs by mouth every 6 hours as needed for Michelle Huitron MD   mirtazapine (REMERON) 15 MG tablet Take 0.5 tablets by mouth nightly  Mildred Vizcaino MD   levothyroxine (SYNTHROID) 50 MCG tablet Take 1 tablet by mouth Daily  Mildred Vizcaino MD   donepezil (ARICEPT) 10 MG tablet Take 10 mg by mouth daily  Historical Provider, MD   simethicone (MYLICON) 497 MG chewable tablet Take 125 mg by mouth every 6 hours as needed for Flatulence  Historical Provider, MD   OXYGEN Inhale 2 L/min into the lungs nightly. Historical Provider, MD     Allergies   Allergen Reactions    Amlodipine Other (See Comments)     Pt says she got all the side effects      Sulfa Antibiotics     Cleocin [Clindamycin] Rash    Macrolides And Ketolides Rash     \"All mycins\"    Pcn [Penicillins] Rash    Tetracyclines & Related Rash    Zithromax [Azithromycin] Rash       Subjective:      Review of Systems   Constitutional: Negative for activity change, appetite change, chills, fatigue and fever. Respiratory: Negative for cough, chest tightness, shortness of breath and wheezing. Cardiovascular: Negative for chest pain, palpitations and leg swelling. Gastrointestinal: Negative for abdominal pain, constipation and diarrhea. Genitourinary: Negative for difficulty urinating. Skin: Positive for rash. Neurological: Negative for dizziness, syncope, weakness, light-headedness and headaches. Psychiatric/Behavioral: Positive for confusion and decreased concentration. Negative for behavioral problems, dysphoric mood and sleep disturbance. The patient is not nervous/anxious. Objective:     Physical Exam  Vitals and nursing note reviewed. Constitutional:       General: She is not in acute distress. Appearance: She is well-developed. Eyes:      Conjunctiva/sclera: Conjunctivae normal.   Neck:      Thyroid: No thyromegaly. Cardiovascular:      Rate and Rhythm: Normal rate and regular rhythm. Heart sounds: Normal heart sounds. No murmur heard.       Pulmonary:      Effort: Pulmonary effort is normal. No respiratory distress. Breath sounds: Normal breath sounds. No wheezing. Abdominal:      General: Abdomen is flat. Bowel sounds are normal.      Palpations: Abdomen is soft. Tenderness: There is no abdominal tenderness. There is no guarding or rebound. Musculoskeletal:      Cervical back: Normal range of motion and neck supple. Lymphadenopathy:      Cervical: No cervical adenopathy. Skin:     General: Skin is warm and dry. Findings: Rash (diffuse covering entire body excluding the face) present. No erythema. Rash is papular. Neurological:      General: No focal deficit present. Mental Status: She is alert. Mental status is at baseline. Psychiatric:         Mood and Affect: Mood normal.         Speech: Speech normal.         Behavior: Behavior normal.         Cognition and Memory: Memory is impaired. Assessment:       Diagnosis Orders   1. Mucopurulent chronic bronchitis ([address]     2. Paranoia ([address]     3. Essential hypertension     4. Late onset Alzheimer's disease without behavioral disturbance ([address]     5. Chronic pansinusitis     6. Dermatitis               Plan:   1. COPD. Stable. No recent changes. She continues to be short of breath on a regular basis, but she has not had any issues with cough or wheezing. 2. Paranoia. Stable. No recent changes. She has not had any new paranoid delusions. I will continue to monitor. 3. Hypertension. Stable. No recent changes in her blood pressure. I will continue to monitor. 4. Dementia. Stable. No recent changes in her behaviors. She continues to be confused but nothing has changed. 5. Sinusitis. Stable. No signs of an infection at this time. She just has some chronic sinus pressure that gives her headaches. 6. Dermatitis. New. I suspect that she may be allergic to the laundry soap because her rash is everywhere that her clothes touch and it is pretty significant.  I will treat again with a Prednisone taper, start her on triamcinolone cream as needed, and Vistaril at bedtime. I will also mention to nursing staff to maybe wash her clothes in a different laundry detergent for a few weeks to see if that makes a difference. Return in about 1 month (around 3/22/2022) for COPD follow up. Patient given educational materials - see patient instructions. Discussed use, benefit, and side effects of prescribed medications. All patient questions answered. Patient voiced understanding. Reviewed health maintenance. Instructed to continue current medications, diet and exercise. Patient agreed with treatment plan. Follow up as directed. Kelsey Dawn am personally transcribing for Giovanni Forman MD 3/4/22 at 11:51 AM EST. Tyree Sarkar MD, personally performed the services described in this document as transcribed by the , and it is both accurate and complete.     Electronically signed by Giovanni Forman MD on 3/7/22 at 5:25 PM EST

## 2022-03-15 ENCOUNTER — OUTSIDE SERVICES (OUTPATIENT)
Dept: FAMILY MEDICINE CLINIC | Age: 87
End: 2022-03-15
Payer: MEDICARE

## 2022-03-15 DIAGNOSIS — I10 ESSENTIAL HYPERTENSION: ICD-10-CM

## 2022-03-15 DIAGNOSIS — F22 PARANOIA (HCC): ICD-10-CM

## 2022-03-15 DIAGNOSIS — F02.80 LATE ONSET ALZHEIMER'S DISEASE WITHOUT BEHAVIORAL DISTURBANCE (HCC): ICD-10-CM

## 2022-03-15 DIAGNOSIS — G30.1 LATE ONSET ALZHEIMER'S DISEASE WITHOUT BEHAVIORAL DISTURBANCE (HCC): ICD-10-CM

## 2022-03-15 DIAGNOSIS — L30.9 DERMATITIS: ICD-10-CM

## 2022-03-15 DIAGNOSIS — J41.1 MUCOPURULENT CHRONIC BRONCHITIS (HCC): Primary | ICD-10-CM

## 2022-03-15 PROCEDURE — 99309 SBSQ NF CARE MODERATE MDM 30: CPT | Performed by: FAMILY MEDICINE

## 2022-03-22 ASSESSMENT — ENCOUNTER SYMPTOMS
WHEEZING: 0
DIARRHEA: 0
COUGH: 0
CONSTIPATION: 0
ABDOMINAL PAIN: 0
CHEST TIGHTNESS: 0
SHORTNESS OF BREATH: 0

## 2022-03-22 NOTE — PROGRESS NOTES
[facility] [address]  [address] [address]  Dept: [phone]  Dept Fax: [phone]  Loc: [phone]    Lisbeth Stevens  is a 80 y.o. female who presents today for her medical conditions/complaints as noted below. Lisbeth Stevens is c/o of     Chief Complaint   Patient presents with    Cough    Anxiety    Dementia    Rash       HPI:   Eduardo Torres is being seen for her regular monthly follow up while at [facility]. Overall, she has been doing about the same. She does not have any specific concerns. She continues to complain that she is short of breath, but she does not require the use of oxygen and she tends to continue to get good relief from her albuterol inhaler when she uses it. She continues to have an excellent appetite and eats most things that she is offered. She does not have any problems with constipation or diarrhea. She continues to be confused but no real issues with wandering recently and she does not seem overly upset. She was very cavalier when I asked where her roommate was, and it turns out that her roommate is in the hospital, but Porfirio response was I think she  and did not seem overly bothered by it. One of Porfirio best friends in [facility] he is currently on hospice, and I was concerned about how she would do with that, but she does not seem to be overly distraught about it at this point. As her memory has continued to worsen, I do not know if she will remember that she used to hang out with this friend on a pretty regular basis. She had otherwise does not really have any concerns, complaints, or questions. She wanted to ask about my family and she continued to remember that I have two children which I always find impressive considering the state of her memory.  She continues to be a bit paranoid and continues to think that people are stealing her stuff on a pretty regular basis, but nothing really new that she has been complaining about. She is also quite friendly with the staff and frequently offers them candy. She did have a pretty bad rash when I saw her about a month ago, but the rash seems to be significantly improved when I saw her today. She still has a few scabs but there is very few left compared to where she was at. I need to discuss with the nursing staff about seeing if her laundry detergent could be switched because I suspect that she may be allergic to it. 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 TC, RRT 4/20/18   Substance Use Topics    Alcohol use: No     Alcohol/week: 0.0 standard drinks      Prior to Visit Medications    Medication Sig Taking?  Authorizing Provider   ciprofloxacin (CIPRO) 500 MG tablet Take 1 tablet by mouth 2 times daily  April Holt MD   mirtazapine (REMERON) 7.5 MG tablet   Historical Provider, MD   busPIRone (BUSPAR) 7.5 MG tablet   Historical Provider, MD   acetaminophen (TYLENOL) 325 MG tablet Take 650 mg by mouth 2 times daily  Historical Provider, MD   albuterol (PROVENTIL) (2.5 MG/3ML) 0.083% nebulizer solution Take 2.5 mg by nebulization 2 times daily  Historical Provider, MD   acetaminophen (TYLENOL) 500 MG tablet Take 500 mg by mouth every 4 hours as needed for Pain or Fever  Historical Provider, MD   benzonatate (TESSALON) 200 MG capsule Take 200 mg by mouth 3 times daily as needed for Cough  Historical Provider, MD   guaiFENesin (ROBITUSSIN) 100 MG/5ML syrup Take 200 mg by mouth 4 times daily as needed for Cough or Congestion  Historical Provider, MD   PARoxetine (PAXIL) 10 MG tablet Take 10 mg by mouth nightly  Historical Provider, MD   busPIRone (BUSPAR) 10 MG tablet Take 2 tablets by mouth 3 times daily  Patient taking differently: Take 7.5 mg by mouth nightly 10mg in morning, 7.5mg at night  Toño Starks MD   lisinopril (PRINIVIL;ZESTRIL) 5 MG tablet Take 1 tablet by mouth daily  Shanda Srinivasan   metoprolol tartrate (LOPRESSOR) 25 MG tablet Take 1 tablet by mouth 2 times daily  Charlie Tesfaye   bethanechol (URECHOLINE) 25 MG tablet Take 1 tablet by mouth 4 times daily  Shanda Srinivasan   Multiple Vitamin (MULTI VITAMIN DAILY) TABS Take 1 tablet by mouth daily Take 1 tab po daily  Alix Murillo MD   fluticasone (FLONASE) 50 MCG/ACT nasal spray 1 spray by Nasal route daily  Alix Murillo MD   hydrochlorothiazide (MICROZIDE) 12.5 MG capsule take 1 capsule by mouth once daily  Alix Murillo MD   Calcium Carbonate (CALCIUM 600 PO) Take 600 mg by mouth 2 times daily  Historical Provider, MD   loratadine (CLARITIN) 10 MG capsule Take 1 capsule by mouth daily  Alix Murillo MD   isosorbide mononitrate (IMDUR) 30 MG extended release tablet Take 1 tablet by mouth daily  Alix Murillo MD   potassium chloride (KLOR-CON M) 20 MEQ extended release tablet Take 1 tablet by mouth 2 times daily  Alix Murillo MD   aspirin 81 MG chewable tablet Take 1 tablet by mouth daily  Alix Murillo MD   budesonide-formoterol (SYMBICORT) 80-4.5 MCG/ACT AERO Inhale 2 puffs into the lungs 2 times daily  Adam Carranza MD Cholecalciferol (VITAMIN D) 2000 UNITS CAPS capsule Take 4,000 Units by mouth daily  Historical Provider, MD   nitroGLYCERIN (NITROSTAT) 0.4 MG SL tablet Place 1 tablet under the tongue every 5 minutes as needed for Chest pain  Rehan Boston MD   aluminum & magnesium hydroxide-simethicone (MAALOX REGULAR STRENGTH) 200-200-20 MG/5ML SUSP suspension Take 30 mLs by mouth every 6 hours as needed for Rhina MD Silver   mirtazapine (REMERON) 15 MG tablet Take 0.5 tablets by mouth nightly  Ileana Salmon MD   levothyroxine (SYNTHROID) 50 MCG tablet Take 1 tablet by mouth Daily  Ileana Salmon MD   donepezil (ARICEPT) 10 MG tablet Take 10 mg by mouth daily  Historical Provider, MD   simethicone (MYLICON) 144 MG chewable tablet Take 125 mg by mouth every 6 hours as needed for Flatulence  Historical Provider, MD   OXYGEN Inhale 2 L/min into the lungs nightly. Historical Provider, MD     Allergies   Allergen Reactions    Amlodipine Other (See Comments)     Pt says she got all the side effects      Sulfa Antibiotics     Cleocin [Clindamycin] Rash    Macrolides And Ketolides Rash     \"All mycins\"    Pcn [Penicillins] Rash    Tetracyclines & Related Rash    Zithromax [Azithromycin] Rash       Subjective:      Review of Systems   Constitutional: Negative for activity change, appetite change, chills, fatigue and fever. Respiratory: Negative for cough, chest tightness, shortness of breath and wheezing. Cardiovascular: Negative for chest pain, palpitations and leg swelling. Gastrointestinal: Negative for abdominal pain, constipation and diarrhea. Genitourinary: Negative for difficulty urinating. Skin: Positive for rash (improving). Neurological: Negative for dizziness, syncope, weakness, light-headedness and headaches. Psychiatric/Behavioral: Positive for confusion and decreased concentration. Negative for behavioral problems, dysphoric mood and sleep disturbance.  The patient is not nervous/anxious. Objective:     Physical Exam  Vitals and nursing note reviewed. Constitutional:       General: She is not in acute distress. Appearance: She is well-developed. Eyes:      Conjunctiva/sclera: Conjunctivae normal.   Neck:      Thyroid: No thyromegaly. Cardiovascular:      Rate and Rhythm: Normal rate and regular rhythm. Heart sounds: Normal heart sounds. No murmur heard. Pulmonary:      Effort: Pulmonary effort is normal. No respiratory distress. Breath sounds: Normal breath sounds. No wheezing. Abdominal:      General: Abdomen is flat. Bowel sounds are normal.      Palpations: Abdomen is soft. Tenderness: There is no abdominal tenderness. There is no guarding or rebound. Musculoskeletal:      Cervical back: Normal range of motion and neck supple. Lymphadenopathy:      Cervical: No cervical adenopathy. Skin:     General: Skin is warm and dry. Findings: No erythema or rash. Comments: Healing rash with some remaining scabs on arms and legs. Neurological:      General: No focal deficit present. Mental Status: She is alert. Mental status is at baseline. Psychiatric:         Mood and Affect: Mood normal.         Speech: Speech normal.         Behavior: Behavior normal.         Cognition and Memory: Memory is impaired. Assessment:       Diagnosis Orders   1. Mucopurulent chronic bronchitis ([address]     2. Paranoia ([address]     3. Essential hypertension     4. Late onset Alzheimer's disease without behavioral disturbance ([address]     5. Dermatitis               Plan:   1. COPD. Stable. She continues to be short of breath with some exertion, but it does not cause her to stop talking or walking and she has not needed oxygen on a regular basis at all. Overall, I would say that she is very stable as her cough is mild and rare. 2. Paranoia. Stable. No recent changes.  She continues to be worried that people are taking her things, but overall it has not changed and the things that she thinks people are stealing has been consistent for years. 3. Hypertension. Stable. Her blood pressure overall has been good. There have not been any concerns with that, thus she will continue her current medications. 4. Dementia. Stable. She is somewhat confused at baseline and does not remember who she is around in the nursing home, but she does still remember who I am and my kids. She has not been wandering. I will just continue to monitor. 5. Dermatitis. Improving. Overall, her rash is significantly better. She just has a little bit of residual scabs which I did encourage her to avoid picking. Return in about 1 month (around 4/15/2022) for COPD follow up, Anxiety follow up. Patient given educational materials - see patient instructions. Discussed use, benefit, and side effects of prescribed medications. All patient questions answered. Patient voiced understanding. Reviewed health maintenance. Instructed to continue current medications, diet and exercise. Patient agreed with treatment plan. Follow up as directed. Anyi Bartlett am personally transcribing for Chapincito Carmona MD 3/22/22 at 2:25 PM EDT. Raul Branch MD, personally performed the services described in this document as transcribed by the , and it is both accurate and complete.     Electronically signed by Chapincito Carmona MD on 3/22/22 at 5:52 PM EDT

## 2022-04-12 ENCOUNTER — OUTSIDE SERVICES (OUTPATIENT)
Dept: PRIMARY CARE CLINIC | Age: 87
End: 2022-04-12
Payer: MEDICARE

## 2022-04-12 DIAGNOSIS — F02.80 LATE ONSET ALZHEIMER'S DISEASE WITHOUT BEHAVIORAL DISTURBANCE (HCC): ICD-10-CM

## 2022-04-12 DIAGNOSIS — I10 ESSENTIAL HYPERTENSION: ICD-10-CM

## 2022-04-12 DIAGNOSIS — G30.1 LATE ONSET ALZHEIMER'S DISEASE WITHOUT BEHAVIORAL DISTURBANCE (HCC): ICD-10-CM

## 2022-04-12 DIAGNOSIS — J32.4 CHRONIC PANSINUSITIS: ICD-10-CM

## 2022-04-12 DIAGNOSIS — F22 PARANOIA (HCC): ICD-10-CM

## 2022-04-12 DIAGNOSIS — J41.1 MUCOPURULENT CHRONIC BRONCHITIS (HCC): Primary | ICD-10-CM

## 2022-04-12 DIAGNOSIS — L30.9 DERMATITIS: ICD-10-CM

## 2022-04-12 PROCEDURE — 99309 SBSQ NF CARE MODERATE MDM 30: CPT | Performed by: FAMILY MEDICINE

## 2022-04-29 ASSESSMENT — ENCOUNTER SYMPTOMS
WHEEZING: 0
CONSTIPATION: 0
CHEST TIGHTNESS: 0
COUGH: 0
DIARRHEA: 0
ABDOMINAL PAIN: 0
SHORTNESS OF BREATH: 1

## 2022-04-29 NOTE — PROGRESS NOTES
[place] [address]  [address] [address]  URGENT CARE A DEPARTMENT OF [place] [address]  [address] [address]  Dept: [phone]  Dept Fax: [phone]    Aneta Jesus  is a 80 y.o. female who presents today for her medical conditions/complaints as noted below. Aneta Jesus is c/o of     Chief Complaint   Patient presents with    Anxiety    Shortness of Breath    Sinus Problem    Dementia       HPI:   Mere Garcia is being seen for her regular monthly follow up while at [facility]. Overall, she seems to be doing about the same. She continues to complain that she has chest tightness and shortness of breath due to the damp weather which is probably accurate today because it is quite rainy. She continues to have chronic headaches and sinus pressure. No recent issues with cough. She has not stopped participating in activities on a regular basis therefore overall she seems to be doing OK. She is still participating in activities on a pretty regular basis and today I caught her sneaking snacks from the snack cart. She does seem a little bit confused as to why her friend is not as available to come sit in the lobby with her because her friend is currently on hospice and seems to be declining as she seems to be sleeping most of the time. Nurses also mentioned that patient still continues to be quite itchy, and she has a rash covering her arms and legs. They checked her bed extensively for bedbugs and did not find anything. Nursing staff suggested that we try the Sarna lotion to see if that would help and I stated that I was certainly open to trying that since it has worked for other patients on the unit. Otherwise, patient denies any problems with abdominal pain, constipation, or diarrhea. No chest pains.      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 TC, RRT 4/20/18   Substance Use Topics    Alcohol use: No     Alcohol/week: 0.0 standard drinks      Prior to Visit Medications    Medication Sig Taking?  Authorizing Provider   ciprofloxacin (CIPRO) 500 MG tablet Take 1 tablet by mouth 2 times daily  Hattie Marcos MD   mirtazapine (REMERON) 7.5 MG tablet   Historical Provider, MD   busPIRone (BUSPAR) 7.5 MG tablet   Historical Provider, MD   acetaminophen (TYLENOL) 325 MG tablet Take 650 mg by mouth 2 times daily  Historical Provider, MD   albuterol (PROVENTIL) (2.5 MG/3ML) 0.083% nebulizer solution Take 2.5 mg by nebulization 2 times daily  Historical Provider, MD   acetaminophen (TYLENOL) 500 MG tablet Take 500 mg by mouth every 4 hours as needed for Pain or Fever  Historical Provider, MD   benzonatate (TESSALON) 200 MG capsule Take 200 mg by mouth 3 times daily as needed for Cough  Historical Provider, MD   guaiFENesin (ROBITUSSIN) 100 MG/5ML syrup Take 200 mg by mouth 4 times daily as needed for Cough or Congestion  Historical Provider, MD   PARoxetine (PAXIL) 10 MG tablet Take 10 mg by mouth nightly  Historical Provider, MD   busPIRone (BUSPAR) 10 MG tablet Take 2 tablets by mouth 3 times daily  Patient taking differently: Take 7.5 mg by mouth nightly 10mg in morning, 7.5mg at night  Mony Quinteros MD   lisinopril (PRINIVIL;ZESTRIL) 5 MG tablet Take 1 tablet by mouth daily  Amara Fisher   metoprolol tartrate (LOPRESSOR) 25 MG tablet Take 1 tablet by mouth 2 times daily  Fernando Tesfaye   bethanechol (URECHOLINE) 25 MG tablet Take 1 tablet by mouth 4 times daily  Amara Fisher   Multiple Vitamin (MULTI VITAMIN DAILY) TABS Take 1 tablet by mouth daily Take 1 tab po daily  Roselia Elaine MD   fluticasone (FLONASE) 50 MCG/ACT nasal spray 1 spray by Nasal route daily  Roselia Elaine MD   hydrochlorothiazide (MICROZIDE) 12.5 MG capsule take 1 capsule by mouth once daily  Roselia Elaine MD   Calcium Carbonate (CALCIUM 600 PO) Take 600 mg by mouth 2 times daily  Historical Provider, MD   loratadine (CLARITIN) 10 MG capsule Take 1 capsule by mouth daily  Roselia Elaine MD   isosorbide mononitrate (IMDUR) 30 MG extended release tablet Take 1 tablet by mouth daily  Roselia Elaine MD   potassium chloride (KLOR-CON M) 20 MEQ extended release tablet Take 1 tablet by mouth 2 times daily  Roselia Elaine MD   aspirin 81 MG chewable tablet Take 1 tablet by mouth daily  Roselia Elaine MD   budesonide-formoterol (SYMBICORT) 80-4.5 MCG/ACT AERO Inhale 2 puffs into the lungs 2 times daily  Brooklyn Arroyo MD   Cholecalciferol (VITAMIN D) 2000 UNITS CAPS capsule Take 4,000 Units by mouth daily  Historical Provider, MD   nitroGLYCERIN (NITROSTAT) 0.4 MG SL tablet Place 1 tablet under the tongue every 5 minutes as needed for Chest pain  Radha Steinberg MD   aluminum & magnesium hydroxide-simethicone (MAALOX REGULAR STRENGTH) 200-200-20 MG/5ML SUSP suspension Take 30 mLs by mouth every 6 hours as needed for Estela Weiss MD   mirtazapine (REMERON) 15 MG tablet Take 0.5 tablets by mouth nightly  Roselia Elaine MD   levothyroxine (SYNTHROID) 50 MCG tablet Take 1 tablet by mouth Daily  Roselia Elaine MD   donepezil (ARICEPT) 10 MG tablet Take 10 mg by mouth daily  Historical Provider, MD   simethicone (MYLICON) 256 MG chewable tablet Take 125 mg by mouth every 6 hours as needed for Flatulence  Historical Provider, MD   OXYGEN Inhale 2 L/min into the lungs nightly. Historical Provider, MD     Allergies   Allergen Reactions    Amlodipine Other (See Comments)     Pt says she got all the side effects      Sulfa Antibiotics     Cleocin [Clindamycin] Rash    Macrolides And Ketolides Rash     \"All mycins\"    Pcn [Penicillins] Rash    Tetracyclines & Related Rash    Zithromax [Azithromycin] Rash       Subjective:      Review of Systems   Constitutional: Negative for activity change, appetite change, chills, fatigue and fever. Respiratory: Positive for shortness of breath. Negative for cough, chest tightness and wheezing. Cardiovascular: Negative for chest pain, palpitations and leg swelling. Gastrointestinal: Negative for abdominal pain, constipation and diarrhea. Genitourinary: Negative for difficulty urinating. Skin: Positive for rash. Neurological: Negative for dizziness, syncope, weakness, light-headedness and headaches. Psychiatric/Behavioral: Positive for confusion and decreased concentration. Negative for behavioral problems, dysphoric mood and sleep disturbance. The patient is not nervous/anxious. Objective:     Physical Exam  Vitals and nursing note reviewed. Constitutional:       General: She is not in acute distress. Appearance: She is well-developed. Eyes:      Conjunctiva/sclera: Conjunctivae normal.   Neck:      Thyroid: No thyromegaly. Cardiovascular:      Rate and Rhythm: Normal rate and regular rhythm. Heart sounds: Normal heart sounds. No murmur heard. Pulmonary:      Effort: Pulmonary effort is normal. No respiratory distress. Breath sounds: Normal breath sounds. No wheezing. Abdominal:      General: Abdomen is flat.  Bowel sounds are normal.      Palpations: Abdomen is soft. Tenderness: There is no abdominal tenderness. There is no guarding or rebound. Musculoskeletal:      Cervical back: Normal range of motion and neck supple. Lymphadenopathy:      Cervical: No cervical adenopathy. Skin:     General: Skin is warm and dry. Findings: Rash present. No erythema. Neurological:      General: No focal deficit present. Mental Status: She is alert. Mental status is at baseline. Psychiatric:         Mood and Affect: Mood normal.         Speech: Speech normal.         Behavior: Behavior normal.         Cognition and Memory: Memory is impaired. Assessment:       Diagnosis Orders   1. Mucopurulent chronic bronchitis ([address]     2. Paranoia ([address]     3. Essential hypertension     4. Late onset Alzheimer's disease without behavioral disturbance ([address]     5. Chronic pansinusitis     6. Dermatitis               Plan:   1. COPD. Stable. No recent issues or flare ups. She seems to be doing pretty well overall. I will continue to monitor. 2. Paranoia. Stable. No recent issues with behaviors or acting overly paranoid. I will continue to monitor. 3. Hypertension. Stable. No recent issues with hypotension and no hypertensive crisis. 4. Dementia. Stable. She seemed a little bit more confused today but today I found her in the hallway and not in her room which is not where I normally see her and I was dressed slightly different today than I normally am which I think threw her off.   5. Chronic sinusitis. Stable. No recent issues. She continues to be congested but that is pretty baseline for her. 6. Dermatitis. I will have nursing staff try sarna lotion on her body       Return in about 1 month (around 5/12/2022) for COPD follow up. Patient given educational materials - see patient instructions. Discussed use, benefit, and side effects of prescribed medications. All patient questions answered. Patient voiced understanding.  Reviewed health maintenance. Instructed to continue current medications, diet and exercise. Patient agreed with treatment plan. Follow up as directed. Alma Smith am personally transcribing for Edith Oakley MD 4/29/22 at 2:18 PM EDT. Penelope Phoenix MD, personally performed the services described in this document as transcribed by the , and it is both accurate and complete.     Electronically signed by Edith Oakley MD on 4/29/22 at 6:42 PM EDT

## 2022-05-10 ENCOUNTER — OUTSIDE SERVICES (OUTPATIENT)
Dept: FAMILY MEDICINE CLINIC | Age: 87
End: 2022-05-10
Payer: MEDICARE

## 2022-05-10 DIAGNOSIS — J41.1 MUCOPURULENT CHRONIC BRONCHITIS (HCC): Primary | ICD-10-CM

## 2022-05-10 DIAGNOSIS — I10 ESSENTIAL HYPERTENSION: ICD-10-CM

## 2022-05-10 DIAGNOSIS — L30.9 DERMATITIS: ICD-10-CM

## 2022-05-10 DIAGNOSIS — F02.80 LATE ONSET ALZHEIMER'S DISEASE WITHOUT BEHAVIORAL DISTURBANCE (HCC): ICD-10-CM

## 2022-05-10 DIAGNOSIS — G30.1 LATE ONSET ALZHEIMER'S DISEASE WITHOUT BEHAVIORAL DISTURBANCE (HCC): ICD-10-CM

## 2022-05-10 DIAGNOSIS — F22 PARANOIA (HCC): ICD-10-CM

## 2022-05-10 LAB
BASOPHILS ABSOLUTE: NORMAL
BASOPHILS ABSOLUTE: NORMAL
BASOPHILS RELATIVE PERCENT: NORMAL
BASOPHILS RELATIVE PERCENT: NORMAL
BUN BLDV-MCNC: 6 MG/DL
CALCIUM SERPL-MCNC: 9 MG/DL
CHLORIDE BLD-SCNC: 100 MMOL/L
CO2: 28 MMOL/L
CREAT SERPL-MCNC: 0.7 MG/DL
EOSINOPHILS ABSOLUTE: NORMAL
EOSINOPHILS ABSOLUTE: NORMAL
EOSINOPHILS RELATIVE PERCENT: NORMAL
EOSINOPHILS RELATIVE PERCENT: NORMAL
GFR CALCULATED: 78
GLUCOSE BLD-MCNC: 85 MG/DL
HCT VFR BLD CALC: 39.2 % (ref 36–46)
HCT VFR BLD CALC: NORMAL %
HEMOGLOBIN: 13 G/DL (ref 12–16)
HEMOGLOBIN: NORMAL
LYMPHOCYTES ABSOLUTE: NORMAL
LYMPHOCYTES ABSOLUTE: NORMAL
LYMPHOCYTES RELATIVE PERCENT: NORMAL
LYMPHOCYTES RELATIVE PERCENT: NORMAL
MCH RBC QN AUTO: 29.8 PG
MCH RBC QN AUTO: NORMAL PG
MCHC RBC AUTO-ENTMCNC: 33.1 G/DL
MCHC RBC AUTO-ENTMCNC: NORMAL G/DL
MCV RBC AUTO: 90 FL
MCV RBC AUTO: NORMAL FL
MONOCYTES ABSOLUTE: NORMAL
MONOCYTES ABSOLUTE: NORMAL
MONOCYTES RELATIVE PERCENT: NORMAL
MONOCYTES RELATIVE PERCENT: NORMAL
NEUTROPHILS ABSOLUTE: NORMAL
NEUTROPHILS ABSOLUTE: NORMAL
NEUTROPHILS RELATIVE PERCENT: NORMAL
NEUTROPHILS RELATIVE PERCENT: NORMAL
PDW BLD-RTO: NORMAL %
PLATELET # BLD: 338 K/ΜL
PLATELET # BLD: NORMAL 10*3/UL
PMV BLD AUTO: 7.4 FL
PMV BLD AUTO: NORMAL FL
POTASSIUM SERPL-SCNC: 4.8 MMOL/L
RBC # BLD: 4.35 10^6/ΜL
RBC # BLD: NORMAL 10*6/UL
SODIUM BLD-SCNC: 137 MMOL/L
WBC # BLD: 7.5 10^3/ML
WBC # BLD: NORMAL 10*3/UL

## 2022-05-10 PROCEDURE — 99309 SBSQ NF CARE MODERATE MDM 30: CPT | Performed by: FAMILY MEDICINE

## 2022-05-11 RX ORDER — DIPHENHYDRAMINE HCL 25 MG
25 TABLET ORAL EVERY 6 HOURS PRN
COMMUNITY

## 2022-05-11 RX ORDER — BUSPIRONE HYDROCHLORIDE 10 MG/1
10 TABLET ORAL 3 TIMES DAILY
COMMUNITY
End: 2022-05-11

## 2022-05-11 RX ORDER — HYDROXYZINE PAMOATE 25 MG/1
25 CAPSULE ORAL NIGHTLY PRN
Status: ON HOLD | COMMUNITY
End: 2022-09-20 | Stop reason: SDUPTHER

## 2022-05-11 RX ORDER — BUSPIRONE HYDROCHLORIDE 10 MG/1
10 TABLET ORAL EVERY MORNING
Qty: 90 TABLET | Refills: 0 | Status: ON HOLD
Start: 2022-05-11 | End: 2022-09-20 | Stop reason: SDUPTHER

## 2022-05-23 ASSESSMENT — ENCOUNTER SYMPTOMS
ABDOMINAL PAIN: 0
COUGH: 0
CHEST TIGHTNESS: 0
WHEEZING: 0
CONSTIPATION: 0
SHORTNESS OF BREATH: 1
DIARRHEA: 0

## 2022-05-23 NOTE — PROGRESS NOTES
[facility] [address]  [address] [address]  Dept: [phone]  Dept Fax: [phone]  Loc: [phone]    Himanshu Solomon  is a 80 y.o. female who presents today for her medical conditions/complaints as noted below. Himanshu Solomon is c/o of     Chief Complaint   Patient presents with    Hypertension    Anxiety    COPD    Dementia    Rash       HPI:   Elio Breaux is being seen for her regular monthly follow up while at [facility]. Overall, she feels that she has been doing about the same. Today, she was a little bit more short of breath than normal, because she said she was exercising prior to being seen. She was not wheezing, and she was not coughing. She was just feeling winded. Patient has not had any problems with regular cough. She does have pretty regular headaches and she did have a headache while I was talking to her. Patient reports she has been eating well. No problems with abdominal pain, constipation, or diarrhea. She continues to have a rash on her arms and legs. She has been having sarna cream placed which does not seem to be making a huge improvement for her. When I went to examine patients legs, I noticed that she had a very significant rash on her legs, which looks like she has scratched and the skin is sloughing off. I also noticed that there seems to be some drainage there. Patient reports that it is quite painful, and she did point that out when I was trying to check for swelling. I asked her how long it has been like that, and she said that she did not know because she does not typically check on her legs. As far as I know wound care has not seen her yet, but I will have them stop by and see her on their rounds this week.     Past Medical History:   Diagnosis Date    Arthritis     Chronic kidney disease     h/o mild renal insufficency    COPD (chronic obstructive pulmonary disease) (HCC)  Dyspnea     chronic    Hypertension     Hyponatremia     Left bundle branch block     chronic, old    [address]anoia ([facility] [address]     paranoia ideation, delusions, phobic behavior in past, seems to wax and wane    Substance abuse (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 TC, RRT 4/20/18   Substance Use Topics    Alcohol use: No     Alcohol/week: 0.0 standard drinks      Prior to Visit Medications    Medication Sig Taking? Authorizing Provider   diphenhydrAMINE (BENADRYL) 25 MG tablet Take 25 mg by mouth every 6 hours as needed for Itching  Historical Provider, MD   busPIRone (BUSPAR) 10 MG tablet Take 1 tablet by mouth every morning  Lulla Lesch, MD   hydrOXYzine (VISTARIL) 25 MG capsule Take 25 mg by mouth nightly as needed for Anxiety  Historical Provider, MD   Magic Mouthwash (MIRACLE MOUTHWASH) Swish and spit 10 mLs 4 times daily as needed (MOUTH PAIN) 1:1 Lidocaine,Nystatin,Diphenhydramine swish and spit.   Historical Provider, MD   camphoconstance-menthol PHYLICIA [facility]) 0.5-0.5 % lotion Apply to effected areas topically at bedtime for itching  Historical Provider, MD   mirtazapine (REMERON) 7.5 MG tablet Take 7.5 mg by mouth nightly   Historical Provider, MD   busPIRone (BUSPAR) 7.5 MG tablet Take 7.5 mg by mouth nightly   Historical Provider, MD   acetaminophen (TYLENOL) 325 MG tablet Take 650 mg by mouth 2 times daily  Historical Provider, MD   albuterol (PROVENTIL) (2.5 MG/3ML) 0.083% nebulizer solution Take 2.5 mg by nebulization 2 times daily  Historical Provider, MD   acetaminophen (TYLENOL) 500 MG tablet Take 500 mg by mouth every 4 hours as needed for Pain or Fever  Historical Provider, MD   benzonatate (TESSALON) 200 MG capsule Take 200 mg by mouth 3 times daily as needed for Cough  Historical Provider, MD   guaiFENesin (ROBITUSSIN) 100 MG/5ML syrup Take 200 mg by mouth 4 times daily as needed for Cough or Congestion  Historical Provider, MD   PARoxetine (PAXIL) 10 MG tablet Take 10 mg by mouth nightly  Historical Provider, MD   lisinopril (PRINIVIL;ZESTRIL) 5 MG tablet Take 1 tablet by mouth daily  Fernanda Ramos   metoprolol tartrate (LOPRESSOR) 25 MG tablet Take 1 tablet by mouth 2 times daily  George Tesfaye   bethanechol (URECHOLINE) 25 MG tablet Take 1 tablet by mouth 4 times daily  Fernanda Ramos   Multiple Vitamin (MULTI VITAMIN DAILY) TABS Take 1 tablet by mouth daily Take 1 tab po daily  Stan Mcdonald MD   fluticasone (FLONASE) 50 MCG/ACT nasal spray 1 spray by Nasal route daily  Stan Mcdonald MD   hydrochlorothiazide (MICROZIDE) 12.5 MG capsule take 1 capsule by mouth once daily  Stan Mcdonald MD   Calcium Carbonate (CALCIUM 600 PO) Take 600 mg by mouth 2 times daily  Historical Provider, MD   loratadine (CLARITIN) 10 MG capsule Take 1 capsule by mouth daily  Stan Mcdonald MD   isosorbide mononitrate (IMDUR) 30 MG extended release tablet Take 1 tablet by mouth daily  Stan Mcdonald MD   potassium chloride (KLOR-CON M) 20 MEQ extended release tablet Take 1 tablet by mouth 2 times daily  Stan Mcdonald MD   aspirin 81 MG chewable tablet Take 1 tablet by mouth daily  Stan Mcdonald MD   budesonide-formoterol (SYMBICORT) 80-4.5 MCG/ACT AERO Inhale 2 puffs into the lungs 2 times daily  Mayo Muniz MD   Cholecalciferol (VITAMIN D) 2000 UNITS CAPS capsule Take 4,000 Units by mouth daily  Historical Provider, MD   nitroGLYCERIN (NITROSTAT) 0.4 MG SL tablet Place 1 tablet under the tongue every 5 minutes as needed for Chest pain  Gavin Carlos MD   aluminum & magnesium hydroxide-simethicone (MAALOX REGULAR STRENGTH) 200-200-20 MG/5ML SUSP suspension Take 30 mLs by mouth every 6 hours as needed for Felisha Mendoza MD   levothyroxine (SYNTHROID) 50 MCG tablet Take 1 tablet by mouth Daily  Jose Miranda MD   donepezil (ARICEPT) 10 MG tablet Take 10 mg by mouth daily  Historical Provider, MD   simethicone (MYLICON) 357 MG chewable tablet Take 125 mg by mouth every 6 hours as needed for Flatulence  Historical Provider, MD   OXYGEN Inhale 2 L/min into the lungs nightly. Historical Provider, MD     Allergies   Allergen Reactions    Amlodipine Other (See Comments)     Pt says she got all the side effects      Sulfa Antibiotics     Cleocin [Clindamycin] Rash    Macrolides And Ketolides Rash     \"All mycins\"    Pcn [Penicillins] Rash    Tetracyclines & Related Rash    Zithromax [Azithromycin] Rash       Subjective:      Review of Systems   Constitutional: Negative for activity change, appetite change, chills, fatigue and fever. Respiratory: Positive for shortness of breath. Negative for cough, chest tightness and wheezing. Cardiovascular: Negative for chest pain, palpitations and leg swelling. Gastrointestinal: Negative for abdominal pain, constipation and diarrhea. Genitourinary: Negative for difficulty urinating. Skin: Positive for rash and wound. Neurological: Negative for dizziness, syncope, weakness, light-headedness and headaches. Psychiatric/Behavioral: Positive for confusion and decreased concentration. Negative for behavioral problems, dysphoric mood and sleep disturbance. The patient is not nervous/anxious. Objective:     Physical Exam  Vitals and nursing note reviewed. Constitutional:       General: She is not in acute distress. Appearance: She is well-developed. Eyes:      Conjunctiva/sclera: Conjunctivae normal.   Neck:      Thyroid: No thyromegaly.    Cardiovascular:      Rate and Rhythm: Normal rate and regular rhythm. Heart sounds: Normal heart sounds. No murmur heard. Pulmonary:      Effort: Pulmonary effort is normal. No respiratory distress. Breath sounds: Normal breath sounds. No wheezing. Abdominal:      General: Abdomen is flat. Bowel sounds are normal.      Palpations: Abdomen is soft. Tenderness: There is no abdominal tenderness. There is no guarding or rebound. Musculoskeletal:      Cervical back: Normal range of motion and neck supple. Lymphadenopathy:      Cervical: No cervical adenopathy. Skin:     General: Skin is warm and dry. Findings: No erythema or rash. Comments: Excoriated rash on arms and legs. On left lower leg there is significant sloughing of the skin with some purulent drainage, area is tender and erythematous. Neurological:      General: No focal deficit present. Mental Status: She is alert. Mental status is at baseline. Psychiatric:         Mood and Affect: Mood normal.         Speech: Speech normal.         Behavior: Behavior normal.         Cognition and Memory: Memory is impaired. Assessment:       Diagnosis Orders   1. Mucopurulent chronic bronchitis ([facility] [address]     2. Paranoia ([facility] [address]     3. Essential hypertension     4. Late onset Alzheimer's disease without behavioral disturbance ([facility] [address]     5. Dermatitis               Plan:   1. COPD. Stable. She continues to be short of breath but no issues with wheezing or cough. The shortness of breath really does not stop her from participating in activities and she is able to walk around the facility without the use of oxygen. 2. Paranoia. Stable. No recent issues with anxiety. She has been pretty much at baseline for several months. 3. Hypertension. Stable. Blood pressure has been well controlled. I will continue her current medications. I will continue to monitor. 4. Dementia. Stable. No recent issues with wandering or behavior changes. She continues to remember my children. 5. Dermatitis. Worsening. Patient appears to have a cellulitis of the left lower leg which I highly suspect developed after she scratched her leg and created an open sore. I will start her on Keflex to treat the infection and I will have wound care see her for help in deciding how to best dress the wound. Return in about 1 month (around 6/10/2022) for HTN follow up, COPD follow up. Patient given educational materials - see patient instructions. Discussed use, benefit, and side effects of prescribed medications. All patient questions answered. Patient voiced understanding. Reviewed health maintenance. Instructed to continue current medications, diet and exercise. Patient agreed with treatment plan. Follow up as directed. Rea Manuel am personally transcribing for Hattie Marcos MD 5/23/22 at 2:38 PM EDT. Eugenio Byrd MD, personally performed the services described in this document as transcribed by the , and it is both accurate and complete.     Electronically signed by Hattie Marcos MD on 5/23/22 at 5:43 PM EDT

## 2022-06-14 ENCOUNTER — OUTSIDE SERVICES (OUTPATIENT)
Dept: FAMILY MEDICINE CLINIC | Age: 87
End: 2022-06-14
Payer: MEDICARE

## 2022-06-14 DIAGNOSIS — I10 ESSENTIAL HYPERTENSION: ICD-10-CM

## 2022-06-14 DIAGNOSIS — F02.80 LATE ONSET ALZHEIMER'S DISEASE WITHOUT BEHAVIORAL DISTURBANCE (HCC): ICD-10-CM

## 2022-06-14 DIAGNOSIS — G30.1 LATE ONSET ALZHEIMER'S DISEASE WITHOUT BEHAVIORAL DISTURBANCE (HCC): ICD-10-CM

## 2022-06-14 DIAGNOSIS — J41.1 MUCOPURULENT CHRONIC BRONCHITIS (HCC): Primary | ICD-10-CM

## 2022-06-14 DIAGNOSIS — L30.9 DERMATITIS: ICD-10-CM

## 2022-06-14 DIAGNOSIS — F22 PARANOIA (HCC): ICD-10-CM

## 2022-06-14 PROCEDURE — 99309 SBSQ NF CARE MODERATE MDM 30: CPT | Performed by: FAMILY MEDICINE

## 2022-06-22 ASSESSMENT — ENCOUNTER SYMPTOMS
WHEEZING: 0
COUGH: 0
SHORTNESS OF BREATH: 1
CONSTIPATION: 0
DIARRHEA: 0
ABDOMINAL PAIN: 0
CHEST TIGHTNESS: 0

## 2022-06-22 NOTE — PROGRESS NOTES
[facility] [address]  [address] [address]  Dept: [phone]  Dept Fax: [phone]  Loc: [phone]    Alphonse Angelucci  is a 80 y.o. female who presents today for her medical conditions/complaints as noted below. Alphonse Angelucci is c/o of     Chief Complaint   Patient presents with    Rash    Hypertension    COPD    Anxiety    Dementia       HPI:   Demario Dorsey is being seen for her regular monthly follow up while at [facility]. Overall, patient has been doing about the same. She is excited currently because she has her own room, as her roommate switched rooms. She otherwise feels that her breathing has been about the same. She has not had any major issues with it recently. She has not been coughing much and she is not overly short of breath. Patient continues to have off and on headaches but nothing that is out of the ordinary or that she is concerned about. She reports that this has been ongoing for most of her life, thus she is used to having these headaches. Her rash continues to be a problem and is still covering a large portion of her body. A biopsy was done that showed possibly insect bites versus hives versus drug eruption. I have looked through her medication list and I cannot find anything that I have changed recently that would have explained when this rash started. I suspect just based on location of the rash as it is covering her entire body except for her face that it is more of a contact dermatitis. I am suspicious that it has to do with either her detergent or her soap that she uses in the shower. Patient is currently seeing wound care for the wounds on her legs caused by flaking of the skin and her picking at it but that seems to be improving some and she does not have any signs of cellulitis at this point.  Her legs are being kept wrapped and moisturized quite aggressively daily to try to help with this skin sloughing. Otherwise, patient he is having a good appetite. No problems with abdominal pain, constipation, or diarrhea. She generally feels pretty good. 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 TC, RRT 4/20/18   Substance Use Topics    Alcohol use: No     Alcohol/week: 0.0 standard drinks      Prior to Visit Medications    Medication Sig Taking? Authorizing Provider   diphenhydrAMINE (BENADRYL) 25 MG tablet Take 25 mg by mouth every 6 hours as needed for Itching  Historical Provider, MD   busPIRone (BUSPAR) 10 MG tablet Take 1 tablet by mouth every morning  Elizabeth Duarte MD   hydrOXYzine (VISTARIL) 25 MG capsule Take 25 mg by mouth nightly as needed for Anxiety  Historical Provider, MD   Magic Mouthwash (MIRACLE MOUTHWASH) Swish and spit 10 mLs 4 times daily as needed (MOUTH PAIN) 1:1 Lidocaine,Nystatin,Diphenhydramine swish and spit.   Historical Provider, MD adenmentmercy [facility]) 0.5-0.5 % lotion Apply to effected areas topically at bedtime for itching  Historical Provider, MD   mirtazapine (REMERON) 7.5 MG tablet Take 7.5 mg by mouth nightly   Historical Provider, MD busPIRone (BUSPAR) 7.5 MG tablet Take 7.5 mg by mouth nightly   Historical Provider, MD   acetaminophen (TYLENOL) 325 MG tablet Take 650 mg by mouth 2 times daily  Historical Provider, MD   albuterol (PROVENTIL) (2.5 MG/3ML) 0.083% nebulizer solution Take 2.5 mg by nebulization 2 times daily  Historical Provider, MD   acetaminophen (TYLENOL) 500 MG tablet Take 500 mg by mouth every 4 hours as needed for Pain or Fever  Historical Provider, MD   benzonatate (TESSALON) 200 MG capsule Take 200 mg by mouth 3 times daily as needed for Cough  Historical Provider, MD   guaiFENesin (ROBITUSSIN) 100 MG/5ML syrup Take 200 mg by mouth 4 times daily as needed for Cough or Congestion  Historical Provider, MD   PARoxetine (PAXIL) 10 MG tablet Take 10 mg by mouth nightly  Historical Provider, MD   lisinopril (PRINIVIL;ZESTRIL) 5 MG tablet Take 1 tablet by mouth daily  Omid Kurtz   metoprolol tartrate (LOPRESSOR) 25 MG tablet Take 1 tablet by mouth 2 times daily  George Tesfaye   bethanechol (URECHOLINE) 25 MG tablet Take 1 tablet by mouth 4 times daily  Omid Kurtz   Multiple Vitamin (MULTI VITAMIN DAILY) TABS Take 1 tablet by mouth daily Take 1 tab po daily  Isauro Youngblood MD   fluticasone (FLONASE) 50 MCG/ACT nasal spray 1 spray by Nasal route daily  Isauro Youngblood MD   hydrochlorothiazide (MICROZIDE) 12.5 MG capsule take 1 capsule by mouth once daily  Isauro Youngblood MD   Calcium Carbonate (CALCIUM 600 PO) Take 600 mg by mouth 2 times daily  Historical Provider, MD   loratadine (CLARITIN) 10 MG capsule Take 1 capsule by mouth daily  Isauro Youngblood MD   isosorbide mononitrate (IMDUR) 30 MG extended release tablet Take 1 tablet by mouth daily  Isauro Youngblood MD   potassium chloride (KLOR-CON M) 20 MEQ extended release tablet Take 1 tablet by mouth 2 times daily  Isauro Youngblood MD   aspirin 81 MG chewable tablet Take 1 tablet by mouth daily  Isauro Youngblood MD   budesonide-formoterol (SYMBICORT) 80-4.5 MCG/ACT AERO Inhale 2 puffs into the lungs 2 times daily  Simin Mora MD   Cholecalciferol (VITAMIN D) 2000 UNITS CAPS capsule Take 4,000 Units by mouth daily  Historical Provider, MD   nitroGLYCERIN (NITROSTAT) 0.4 MG SL tablet Place 1 tablet under the tongue every 5 minutes as needed for Chest pain  Reba Pena MD   aluminum & magnesium hydroxide-simethicone (MAALOX REGULAR STRENGTH) 200-200-20 MG/5ML SUSP suspension Take 30 mLs by mouth every 6 hours as needed for Rosendo Maya MD   levothyroxine (SYNTHROID) 50 MCG tablet Take 1 tablet by mouth Daily  Tk Miranda MD   donepezil (ARICEPT) 10 MG tablet Take 10 mg by mouth daily  Historical Provider, MD   simethicone (MYLICON) 358 MG chewable tablet Take 125 mg by mouth every 6 hours as needed for Flatulence  Historical Provider, MD   OXYGEN Inhale 2 L/min into the lungs nightly. Historical Provider, MD     Allergies   Allergen Reactions    Amlodipine Other (See Comments)     Pt says she got all the side effects      Sulfa Antibiotics     Cleocin [Clindamycin] Rash    Macrolides And Ketolides Rash     \"All mycins\"    Pcn [Penicillins] Rash    Tetracyclines & Related Rash    Zithromax [Azithromycin] Rash       Subjective:      Review of Systems   Constitutional: Negative for activity change, appetite change, chills, fatigue and fever. Respiratory: Positive for shortness of breath. Negative for cough, chest tightness and wheezing. Cardiovascular: Negative for chest pain, palpitations and leg swelling. Gastrointestinal: Negative for abdominal pain, constipation and diarrhea. Genitourinary: Negative for difficulty urinating. Skin: Positive for rash. Neurological: Negative for dizziness, syncope, weakness, light-headedness and headaches. Psychiatric/Behavioral: Positive for confusion and decreased concentration. Negative for behavioral problems, dysphoric mood and sleep disturbance. The patient is not nervous/anxious. Objective:     Physical Exam  Vitals and nursing note reviewed. Constitutional:       General: She is not in acute distress. Appearance: She is well-developed. Eyes:      Conjunctiva/sclera: Conjunctivae normal.   Neck:      Thyroid: No thyromegaly. Cardiovascular:      Rate and Rhythm: Normal rate and regular rhythm. Heart sounds: Normal heart sounds. No murmur heard. Pulmonary:      Effort: Pulmonary effort is normal. No respiratory distress. Breath sounds: Normal breath sounds. No wheezing. Abdominal:      General: Abdomen is flat. Bowel sounds are normal.      Palpations: Abdomen is soft. Tenderness: There is no abdominal tenderness. There is no guarding or rebound. Musculoskeletal:      Cervical back: Normal range of motion and neck supple. Lymphadenopathy:      Cervical: No cervical adenopathy. Skin:     General: Skin is warm and dry. Findings: Rash present. No erythema. Rash is papular. Neurological:      General: No focal deficit present. Mental Status: She is alert. Mental status is at baseline. Psychiatric:         Mood and Affect: Mood normal.         Speech: Speech normal.         Behavior: Behavior normal.         Cognition and Memory: Memory is impaired. Assessment:       Diagnosis Orders   1. Mucopurulent chronic bronchitis ([address]     2. Paranoia ([address]     3. Essential hypertension     4. Late onset Alzheimer's disease without behavioral disturbance ([address]     5. Dermatitis               Plan:   1. COPD. Stable. She continues to be somewhat short of breath, but she is rarely wheezing and has generally been doing quite well. 2. Paranoia. Stable. No recent issues with this. She does seem to be happier in her own room. She has not had any problems with behaviors or lashing out at people. 3. Hypertension. Stable. Blood pressures have been well controlled. I will continue her current medications. 4. Dementia. Stable.  She was woken up from sleep when I came into day and she did still recognize me and was able to quickly figure out who I was and what I was doing in her room. She has not had any problems with wandering and no behavioral disturbances. I will continue to monitor. 5. Dermatitis. Stable. It has not really worsened recently. She is still having problems with skin sloughing. She is having her legs wrapped and moisturized but otherwise nothing really to explain this. I am going to have nursing staff see if they can try a different detergent or soap in the shower with her to see if that helps at all. Return in about 1 month (around 7/14/2022) for COPD follow up, HTN follow up. Patient given educational materials - see patient instructions. Discussed use, benefit, and side effects of prescribed medications. All patient questions answered. Patient voiced understanding. Reviewed health maintenance. Instructed to continue current medications, diet and exercise. Patient agreed with treatment plan. Follow up as directed. Preet Dubose am personally transcribing for Chely Catalan MD 6/22/22 at 11:01 AM EDT. Vivian Clark MD, personally performed the services described in this document as transcribed by the , and it is both accurate and complete.     Electronically signed by Chely Catalan MD on 6/22/22 at 12:29 PM EDT

## 2022-07-13 DIAGNOSIS — H91.93 BILATERAL HEARING LOSS, UNSPECIFIED HEARING LOSS TYPE: Primary | ICD-10-CM

## 2022-08-04 ENCOUNTER — OFFICE VISIT (OUTPATIENT)
Dept: OTOLARYNGOLOGY | Age: 87
End: 2022-08-04
Payer: MEDICARE

## 2022-08-04 ENCOUNTER — APPOINTMENT (OUTPATIENT)
Dept: GENERAL RADIOLOGY | Age: 87
End: 2022-08-04
Payer: MEDICARE

## 2022-08-04 ENCOUNTER — HOSPITAL ENCOUNTER (EMERGENCY)
Age: 87
Discharge: HOME | End: 2022-08-04
Attending: EMERGENCY MEDICINE
Payer: MEDICARE

## 2022-08-04 VITALS
BODY MASS INDEX: 21.61 KG/M2 | SYSTOLIC BLOOD PRESSURE: 120 MMHG | WEIGHT: 126.6 LBS | OXYGEN SATURATION: 98 % | HEIGHT: 64 IN | DIASTOLIC BLOOD PRESSURE: 60 MMHG | RESPIRATION RATE: 18 BRPM | HEART RATE: 58 BPM

## 2022-08-04 VITALS
DIASTOLIC BLOOD PRESSURE: 52 MMHG | HEIGHT: 64 IN | BODY MASS INDEX: 23.05 KG/M2 | OXYGEN SATURATION: 93 % | WEIGHT: 135 LBS | SYSTOLIC BLOOD PRESSURE: 154 MMHG | TEMPERATURE: 98.3 F | RESPIRATION RATE: 19 BRPM | HEART RATE: 76 BPM

## 2022-08-04 DIAGNOSIS — H90.3 SENSORINEURAL HEARING LOSS (SNHL) OF BOTH EARS: ICD-10-CM

## 2022-08-04 DIAGNOSIS — R07.9 CHEST PAIN, UNSPECIFIED TYPE: Primary | ICD-10-CM

## 2022-08-04 DIAGNOSIS — H61.23 BILATERAL IMPACTED CERUMEN: Primary | ICD-10-CM

## 2022-08-04 LAB
ABSOLUTE EOS #: 0.97 K/UL (ref 0–0.44)
ABSOLUTE IMMATURE GRANULOCYTE: 0.06 K/UL (ref 0–0.3)
ABSOLUTE LYMPH #: 1.43 K/UL (ref 1.1–3.7)
ABSOLUTE MONO #: 0.85 K/UL (ref 0.1–1.2)
ANION GAP SERPL CALCULATED.3IONS-SCNC: 11 MMOL/L (ref 9–17)
BASOPHILS # BLD: 1 % (ref 0–2)
BASOPHILS ABSOLUTE: 0.09 K/UL (ref 0–0.2)
BUN BLDV-MCNC: 12 MG/DL (ref 8–23)
BUN/CREAT BLD: 16 (ref 9–20)
CALCIUM SERPL-MCNC: 10.1 MG/DL (ref 8.6–10.4)
CHLORIDE BLD-SCNC: 94 MMOL/L (ref 98–107)
CO2: 27 MMOL/L (ref 20–31)
CREAT SERPL-MCNC: 0.74 MG/DL (ref 0.5–0.9)
EOSINOPHILS RELATIVE PERCENT: 10 % (ref 1–4)
GFR AFRICAN AMERICAN: >60 ML/MIN
GFR NON-AFRICAN AMERICAN: >60 ML/MIN
GFR SERPL CREATININE-BSD FRML MDRD: ABNORMAL ML/MIN/{1.73_M2}
GLUCOSE BLD-MCNC: 117 MG/DL (ref 70–99)
HCT VFR BLD CALC: 40 % (ref 36.3–47.1)
HEMOGLOBIN: 12.9 G/DL (ref 11.9–15.1)
IMMATURE GRANULOCYTES: 1 %
LYMPHOCYTES # BLD: 15 % (ref 24–43)
MCH RBC QN AUTO: 28.7 PG (ref 25.2–33.5)
MCHC RBC AUTO-ENTMCNC: 32.3 G/DL (ref 25.2–33.5)
MCV RBC AUTO: 89.1 FL (ref 82.6–102.9)
MONOCYTES # BLD: 9 % (ref 3–12)
NRBC AUTOMATED: 0 PER 100 WBC
PDW BLD-RTO: 13.3 % (ref 11.8–14.4)
PLATELET # BLD: 387 K/UL (ref 138–453)
PMV BLD AUTO: 9 FL (ref 8.1–13.5)
POTASSIUM SERPL-SCNC: 4.1 MMOL/L (ref 3.7–5.3)
RBC # BLD: 4.49 M/UL (ref 3.95–5.11)
SEG NEUTROPHILS: 64 % (ref 36–65)
SEGMENTED NEUTROPHILS ABSOLUTE COUNT: 6.12 K/UL (ref 1.5–8.1)
SODIUM BLD-SCNC: 132 MMOL/L (ref 135–144)
TROPONIN, HIGH SENSITIVITY: 16 NG/L (ref 0–14)
TROPONIN, HIGH SENSITIVITY: 16 NG/L (ref 0–14)
WBC # BLD: 9.5 K/UL (ref 3.5–11.3)

## 2022-08-04 PROCEDURE — 36415 COLL VENOUS BLD VENIPUNCTURE: CPT

## 2022-08-04 PROCEDURE — 84484 ASSAY OF TROPONIN QUANT: CPT

## 2022-08-04 PROCEDURE — 99203 OFFICE O/P NEW LOW 30 MIN: CPT | Performed by: OTOLARYNGOLOGY

## 2022-08-04 PROCEDURE — 71045 X-RAY EXAM CHEST 1 VIEW: CPT

## 2022-08-04 PROCEDURE — 1123F ACP DISCUSS/DSCN MKR DOCD: CPT | Performed by: OTOLARYNGOLOGY

## 2022-08-04 PROCEDURE — 93005 ELECTROCARDIOGRAM TRACING: CPT | Performed by: EMERGENCY MEDICINE

## 2022-08-04 PROCEDURE — G8420 CALC BMI NORM PARAMETERS: HCPCS | Performed by: OTOLARYNGOLOGY

## 2022-08-04 PROCEDURE — 1036F TOBACCO NON-USER: CPT | Performed by: OTOLARYNGOLOGY

## 2022-08-04 PROCEDURE — 99285 EMERGENCY DEPT VISIT HI MDM: CPT

## 2022-08-04 PROCEDURE — 1090F PRES/ABSN URINE INCON ASSESS: CPT | Performed by: OTOLARYNGOLOGY

## 2022-08-04 PROCEDURE — 80048 BASIC METABOLIC PNL TOTAL CA: CPT

## 2022-08-04 PROCEDURE — G8427 DOCREV CUR MEDS BY ELIG CLIN: HCPCS | Performed by: OTOLARYNGOLOGY

## 2022-08-04 PROCEDURE — 69210 REMOVE IMPACTED EAR WAX UNI: CPT | Performed by: OTOLARYNGOLOGY

## 2022-08-04 PROCEDURE — 99213 OFFICE O/P EST LOW 20 MIN: CPT | Performed by: OTOLARYNGOLOGY

## 2022-08-04 PROCEDURE — 85025 COMPLETE CBC W/AUTO DIFF WBC: CPT

## 2022-08-04 ASSESSMENT — PAIN DESCRIPTION - ORIENTATION: ORIENTATION: LEFT;MID

## 2022-08-04 ASSESSMENT — PAIN SCALES - GENERAL
PAINLEVEL_OUTOF10: 0
PAINLEVEL_OUTOF10: 3
PAINLEVEL_OUTOF10: 0

## 2022-08-04 ASSESSMENT — PAIN DESCRIPTION - PAIN TYPE: TYPE: ACUTE PAIN

## 2022-08-04 ASSESSMENT — PAIN DESCRIPTION - DESCRIPTORS: DESCRIPTORS: ACHING

## 2022-08-04 ASSESSMENT — PAIN - FUNCTIONAL ASSESSMENT
PAIN_FUNCTIONAL_ASSESSMENT: NONE - DENIES PAIN
PAIN_FUNCTIONAL_ASSESSMENT: PREVENTS OR INTERFERES SOME ACTIVE ACTIVITIES AND ADLS
PAIN_FUNCTIONAL_ASSESSMENT: 0-10

## 2022-08-04 ASSESSMENT — PAIN DESCRIPTION - FREQUENCY: FREQUENCY: CONTINUOUS

## 2022-08-04 ASSESSMENT — PAIN DESCRIPTION - LOCATION: LOCATION: CHEST

## 2022-08-04 ASSESSMENT — PAIN DESCRIPTION - ONSET: ONSET: ON-GOING

## 2022-08-04 NOTE — ED PROVIDER NOTES
[address]      Pt Name: Martin Gifford  MRN: [id number]  [place] 12/6/1930  Date of evaluation: 8/4/2022      CHIEF COMPLAINT       Chief Complaint   Patient presents with    Chest Pain     Was performing PT at the nursing home and noticed chest pain to the left mid sternal chest pain rated it a 7/10. EMS gave pt 4 81 mg Baby aspirin and total of 2 nitro. HISTORY OF PRESENT ILLNESS      The presents with chest pain. She was doing physical therapy at the nursing home and started having pain in the left side of the chest.  She rated it as 7 out of 10 when the ambulance arrived. They gave the patient 4 81 mg baby aspirin and 2 nitroglycerin. Her pain is now down to a 3 and she started to feel much better. Her blood pressure is coming down as well. The patient denies headache. She denies weakness. She denies shortness of breath. Nothing makes her symptoms better or worse otherwise. REVIEW OF SYSTEMS       All systems reviewed and negative unless noted in HPI. The patient denies fever or constitutional symptoms. Denies vision change. Denies any sore throat or rhinorrhea. Denies any neck pain or stiffness. Pain as noted in HPI. Shortness of breath. No nausea,  vomiting or diarrhea. Denies any dysuria. Denies urinary frequency or hematuria. Denies musculoskeletal injury or pain. Denies any weakness, numbness or focal neurologic deficit. Struve chronic edema. No recent psychiatric issues. No easy bruising or bleeding. Denies any polyuria, polydypsia or history of immunocompromise. PAST MEDICAL HISTORY    has a past medical history of Arthritis, Chronic kidney disease, COPD (chronic obstructive pulmonary disease) ([facility] [address] Dyspnea, Hypertension, Hyponatremia, Left bundle branch block, Paranoia ([facility] [address] and Substance abuse ([facility] [address]     SURGICAL HISTORY      has a past surgical history that includes other surgical history and Tonsillectomy and adenoidectomy.     CURRENT MEDICATIONS       Previous Medications    ACETAMINOPHEN (TYLENOL) 325 MG TABLET    Take 650 mg by mouth 2 times daily    ACETAMINOPHEN (TYLENOL) 500 MG TABLET    Take 500 mg by mouth every 4 hours as needed for Pain or Fever    ALBUTEROL (PROVENTIL) (2.5 MG/3ML) 0.083% NEBULIZER SOLUTION    Take 2.5 mg by nebulization 2 times daily    ALUMINUM & MAGNESIUM HYDROXIDE-SIMETHICONE (MAALOX REGULAR STRENGTH) 200-200-20 MG/5ML SUSP SUSPENSION    Take 30 mLs by mouth every 6 hours as needed for Indigestion    ASPIRIN 81 MG CHEWABLE TABLET    Take 1 tablet by mouth daily    BENZONATATE (TESSALON) 200 MG CAPSULE    Take 200 mg by mouth 3 times daily as needed for Cough    BETHANECHOL (URECHOLINE) 25 MG TABLET    Take 1 tablet by mouth 4 times daily    BUDESONIDE-FORMOTEROL (SYMBICORT) 80-4.5 MCG/ACT AERO    Inhale 2 puffs into the lungs 2 times daily    BUSPIRONE (BUSPAR) 10 MG TABLET    Take 1 tablet by mouth every morning    BUSPIRONE (BUSPAR) 7.5 MG TABLET    Take 7.5 mg by mouth nightly     CALCIUM CARBONATE (CALCIUM 600 PO)    Take 600 mg by mouth 2 times daily    CAMPHOR-MENTHOL (SARNA) 0.5-0.5 % LOTION    Apply to effected areas topically at bedtime for itching    CHOLECALCIFEROL (VITAMIN D) 2000 UNITS CAPS CAPSULE    Take 4,000 Units by mouth daily    DIPHENHYDRAMINE (BENADRYL) 25 MG TABLET    Take 25 mg by mouth every 6 hours as needed for Itching    DONEPEZIL (ARICEPT) 10 MG TABLET    Take 10 mg by mouth daily    FLUTICASONE (FLONASE) 50 MCG/ACT NASAL SPRAY    1 spray by Nasal route daily    GUAIFENESIN (ROBITUSSIN) 100 MG/5ML SYRUP    Take 200 mg by mouth 4 times daily as needed for Cough or Congestion    HYDROCHLOROTHIAZIDE (MICROZIDE) 12.5 MG CAPSULE    take 1 capsule by mouth once daily    HYDROXYZINE (VISTARIL) 25 MG CAPSULE    Take 25 mg by mouth nightly as needed for Anxiety    ISOSORBIDE MONONITRATE (IMDUR) 30 MG EXTENDED RELEASE TABLET    Take 1 tablet by mouth daily    LEVOTHYROXINE (SYNTHROID) 50 MCG TABLET    Take 1 tablet by mouth Daily    LISINOPRIL (PRINIVIL;ZESTRIL) 5 MG TABLET    Take 1 tablet by mouth daily    LORATADINE (CLARITIN) 10 MG CAPSULE    Take 1 capsule by mouth daily    MAGIC MOUTHWASH (MIRACLE MOUTHWASH)    Swish and spit 10 mLs 4 times daily as needed (MOUTH PAIN) 1:1 Lidocaine,Nystatin,Diphenhydramine swish and spit. METOPROLOL TARTRATE (LOPRESSOR) 25 MG TABLET    Take 1 tablet by mouth 2 times daily    MIRTAZAPINE (REMERON) 7.5 MG TABLET    Take 7.5 mg by mouth nightly     MULTIPLE VITAMIN (MULTI VITAMIN DAILY) TABS    Take 1 tablet by mouth daily Take 1 tab po daily    NITROGLYCERIN (NITROSTAT) 0.4 MG SL TABLET    Place 1 tablet under the tongue every 5 minutes as needed for Chest pain    OXYGEN    Inhale 2 L/min into the lungs nightly. PAROXETINE (PAXIL) 10 MG TABLET    Take 10 mg by mouth nightly    POTASSIUM CHLORIDE (KLOR-CON M) 20 MEQ EXTENDED RELEASE TABLET    Take 1 tablet by mouth 2 times daily    SIMETHICONE (MYLICON) 152 MG CHEWABLE TABLET    Take 125 mg by mouth every 6 hours as needed for Flatulence       ALLERGIES     is allergic to amlodipine, sulfa antibiotics, cleocin [clindamycin], macrolides and ketolides, pcn [penicillins], tetracyclines & related, and zithromax [azithromycin]. FAMILY HISTORY     She indicated that her mother is . She indicated that her father is . She indicated that her sister is alive. She indicated that her brother is alive. She indicated that her maternal grandmother is . She indicated that her maternal grandfather is . She indicated that her paternal grandmother is . She indicated that her paternal grandfather is . family history includes Diabetes in her brother; High Blood Pressure in her sister. SOCIAL HISTORY      reports that she has never smoked.  She has never used smokeless tobacco. She reports that she does not drink alcohol and does not use drugs. PHYSICAL EXAM     INITIAL VITALS:  height is 5' 4\" (1.626 m) and weight is 135 lb (61.2 kg). Her tympanic temperature is 97 °F (36.1 °C). Her blood pressure is 177/90 (abnormal) and her pulse is 79. Her respiration is 20 and oxygen saturation is 95%. The patient is alert and oriented, in no apparent distress. HEENT is atraumatic. Pupils are PERRL at 4 mm. Mucous membranes moist.    Neck is supple with no lymphadenopathy. No JVD. No meningismus. Heart sounds regular rate and rhythm with no gallops, murmurs, or rubs. No change with palpation or deep breathing. Lungs clear, no wheezes, rales or rhonchi. Abdomen: soft, nontender with no pain to palpation. No pulsatile mass. Normal bowel sounds are noted. No rebound or guarding. Musculoskeletal exam shows no evidence of trauma. Normal distal pulses in all extremities. Skin: 2-3+ edema in legs with dressings noted on the distal legs. Neurological exam reveals cranial nerves 2 through 12 grossly intact. Patient has equal  and normal deep tendon reflexes. Psychiatric: no hallucinations or suicidal ideation. Lymphatics.:  No lymphadenopathy. DIFFERENTIAL DIAGNOSIS/ MDM:     Angina, AMI, ACS    DIAGNOSTIC RESULTS     EKG: All EKG's are interpreted by the Emergency Department Physician who either signs or Co-signs this chart in the absence of a cardiologist.    Sinus 68 with nonspecific ST change. VH noted. Axis -53, , , . Age-indeterminate infarct. Patient's morphology has changed in V6 only. RADIOLOGY:   I reviewed the radiologist interpretations:  XR CHEST PORTABLE   Final Result   Mild left greater than right basilar pulmonary opacities which could be due   to atelectasis, though clinical correlation is recommended to exclude   contributory infection. Tortuous and prominent aorta, somewhat increased in conspicuity from remote   prior imaging.                 XR CHEST PORTABLE (Final result)  Result time 08/04/22 17:08:19  Final result by Zohaib Young DO (08/04/22 17:08:19)                Impression:    Mild left greater than right basilar pulmonary opacities which could be due   to atelectasis, though clinical correlation is recommended to exclude   contributory infection. Tortuous and prominent aorta, somewhat increased in conspicuity from remote   prior imaging. Narrative:    EXAMINATION:   ONE XRAY VIEW OF THE CHEST     8/4/2022 1:50 pm     COMPARISON:   05/07/2018, 04/20/2018, 01/19/2017, 04/18/2012     HISTORY:   ORDERING SYSTEM PROVIDED HISTORY: chest pain   TECHNOLOGIST PROVIDED HISTORY:   chest pain   Reason for Exam: chest pain, history of asthma     FINDINGS:   Mild left greater than right basilar pulmonary opacities. Calcified   granuloma in the right upper lung. No pneumothorax or sizable effusion. Heart size unchanged. Unchanged prominence of the right paratracheal region   relative to remote prior imaging which appears to correspond to shadowing   from the proximal brachiocephalic and right subclavian on prior chest CT. Aorta is tortuous and prominent, slightly increased in conspicuity from   remote prior imaging. Degenerative changes on a background of osseous   demineralization. No gross acute bony findings.                      LABS:  Results for orders placed or performed during the hospital encounter of 08/04/22   CBC with Auto Differential   Result Value Ref Range    WBC 9.5 3.5 - 11.3 k/uL    RBC 4.49 3.95 - 5.11 m/uL    Hemoglobin 12.9 11.9 - 15.1 g/dL    Hematocrit 40.0 36.3 - 47.1 %    MCV 89.1 82.6 - 102.9 fL    MCH 28.7 25.2 - 33.5 pg    MCHC 32.3 25.2 - 33.5 g/dL    RDW 13.3 11.8 - 14.4 %    Platelets 999 963 - 207 k/uL    MPV 9.0 8.1 - 13.5 fL    NRBC Automated 0.0 0.0 per 100 WBC    Seg Neutrophils 64 36 - 65 %    Lymphocytes 15 (L) 24 - 43 %    Monocytes 9 3 - 12 %    Eosinophils % 10 (H) 1 - 4 %    Basophils 1 0 - 2 % Immature Granulocytes 1 (H) 0 %    Segs Absolute 6.12 1.50 - 8.10 k/uL    Absolute Lymph # 1.43 1.10 - 3.70 k/uL    Absolute Mono # 0.85 0.10 - 1.20 k/uL    Absolute Eos # 0.97 (H) 0.00 - 0.44 k/uL    Basophils Absolute 0.09 0.00 - 0.20 k/uL    Absolute Immature Granulocyte 0.06 0.00 - 0.30 k/uL   Basic Metabolic Panel   Result Value Ref Range    Glucose 117 (H) 70 - 99 mg/dL    BUN 12 8 - 23 mg/dL    Creatinine 0.74 0.50 - 0.90 mg/dL    Bun/Cre Ratio 16 9 - 20    Calcium 10.1 8.6 - 10.4 mg/dL    Sodium 132 (L) 135 - 144 mmol/L    Potassium 4.1 3.7 - 5.3 mmol/L    Chloride 94 (L) 98 - 107 mmol/L    CO2 27 20 - 31 mmol/L    Anion Gap 11 9 - 17 mmol/L    GFR Non-African American >60 >60 mL/min    GFR African American >60 >60 mL/min    GFR Comment         Troponin   Result Value Ref Range    Troponin, High Sensitivity 16 (H) 0 - 14 ng/L   Troponin   Result Value Ref Range    Troponin, High Sensitivity 16 (H) 0 - 14 ng/L   EKG 12 Lead   Result Value Ref Range    Ventricular Rate 68 BPM    Atrial Rate 68 BPM    P-R Interval 168 ms    QRS Duration 122 ms    Q-T Interval 464 ms    QTc Calculation (Bazett) 493 ms    P Axis -25 degrees    R Axis -53 degrees    T Axis 79 degrees         EMERGENCY DEPARTMENT COURSE:   Vitals:    Vitals:    08/04/22 1745 08/04/22 1800 08/04/22 1815 08/04/22 1830   BP:       Pulse: 64 65 70 79   Resp: 17 18 17 20   Temp:       TempSrc:       SpO2: 95% 98% 98% 95%   Weight:       Height:         -------------------------  BP: (!) 177/90, Temp: 97 °F (36.1 °C), Heart Rate: 79, Resp: 20      Re-evaluation Notes    The patient likely has some angina. Her pain improved with rest and nitrates. I do not think she needs to be admitted to the hospital.  Her troponin is stable. She is discharged in good condition. FINAL IMPRESSION      1.  Chest pain, unspecified type          DISPOSITION/PLAN   DISPOSITION Decision To Discharge 08/04/2022 07:05:31 PM      Condition on Disposition    good    PATIENT REFERRED TO:  Dayday Long MD  [address]  [phone]    In 3 days      DISCHARGE MEDICATIONS:  New Prescriptions    No medications on file       (Please note that portions of this note were completed with a voice recognition program.  Efforts were made to edit the dictations but occasionally words are mis-transcribed.)    Nik Arrington MD,, MD   Attending Emergency Physician          Raghav Cuellar MD  08/04/22 0793

## 2022-08-04 NOTE — DISCHARGE INSTRUCTIONS
Continue medications as directed. Return for worsening pain, difficulty breathing, or if worse in any way. PLEASE RETURN TO THE EMERGENCY DEPARTMENT IMMEDIATELY if your symptoms worsen in anyway or in 8-12 hours if not improved for re-evaluation. You should immediately return to the ER for symptoms such as increasing pain, shortness of breath, fever, a feeling of passing out, light headed, dizziness, abdominal pain, numbness or weakness to the arms or legs, coolness or color change of the arms or legs. Take your medication as indicated and prescribed. If you are given an antibiotic then, make sure you get the prescription filled and take the antibiotics until finished. Please understand that at this time there is no evidence for a more serious underlying process, but that early in the process of an illness or injury, an emergency department workup can be falsely reassuring. You should contact your family doctor within the next 24 hours for a follow up appointment    Inge Sanchez!!!    From [facility] ([facility]) and [facility] Emergency Services    On behalf of the Emergency Department staff at [facility]), I would like to thank you for giving us the opportunity to address your health care needs and concerns. We hope that during your visit, our service was delivered in a professional and caring manner. Please keep [facility]) in mind as we walk with you down the path to your own personal wellness. Please expect an automated text message or email from us so we can ask a few questions about your health and progress. Based on your answers, a clinician may call you back to offer help and instructions. Please understand that early in the process of an illness or injury, an emergency department workup can be falsely reassuring. If you notice any worsening, changing or persistent symptoms please call your family doctor or return to the ER immediately.      Tell us how we did during your visit at [email or web address]   and let us know about your experience

## 2022-08-04 NOTE — PROGRESS NOTES
2022 9:53 AM EDT  Camden Melchor (:  1930) is a 80 y.o. female,New patient, here for evaluation of the following chief complaint(s):  Hearing Problem (Nw- hearing loss needs medical clearance for hearing aids) and Cerumen Impaction (Needs ears cleaned)      ASSESSMENT/PLAN:  1. Bilateral impacted cerumen    2. Sensorineural hearing loss (SNHL) of both ears      1. Bilateral impacted cerumen  2. Sensorineural hearing loss (SNHL) of both ears  Clear cerumen  Medically cleared for hearing aids  Follow-up as needed  No follow-ups on file. SUBJECTIVE/OBJECTIVE:  HPI  79yo woman with bilateral hearing loss on recent audiogram. Does not notice any hearing loss. Was referred to ENT for bilateral cerumen removal and medical clearance for hearing aids. 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  TC, RRT 18              Alcohol History       Alcohol Use Status  No              Drug Use       Drug Use Status  No              Sexual Activity       Sexually Active  Not Asked                  Family History   Problem Relation Age of Onset High Blood Pressure Sister     Diabetes Brother      Current Outpatient Medications   Medication Instructions    acetaminophen (TYLENOL) 650 mg, 2 TIMES DAILY    acetaminophen (TYLENOL) 500 mg, EVERY 4 HOURS PRN    albuterol (PROVENTIL) 2.5 mg, Nebulization, 2 TIMES DAILY    aluminum & magnesium hydroxide-simethicone (MAALOX REGULAR STRENGTH) 200-200-20 MG/5ML SUSP suspension 30 mLs, Oral, EVERY 6 HOURS PRN    aspirin 81 mg, Oral, DAILY    benzonatate (TESSALON) 200 mg, Oral, 3 TIMES DAILY PRN    bethanechol (URECHOLINE) 25 mg, Oral, 4 TIMES DAILY    budesonide-formoterol (SYMBICORT) 80-4.5 MCG/ACT AERO 2 puffs, Inhalation, 2 TIMES DAILY    busPIRone (BUSPAR) 7.5 mg, Oral, NIGHTLY    busPIRone (BUSPAR) 10 mg, Oral, EVERY MORNING    Calcium Carbonate (CALCIUM 600 PO) 600 mg, Oral, 2 TIMES DAILY    camphor-menthol (SARNA) 0.5-0.5 % lotion Apply to effected areas topically at bedtime for itching     diphenhydrAMINE (BENADRYL) 25 mg, Oral, EVERY 6 HOURS PRN    donepezil (ARICEPT) 10 mg, Oral, DAILY    fluticasone (FLONASE) 50 MCG/ACT nasal spray 1 spray, Nasal, DAILY    guaiFENesin (ROBITUSSIN) 200 mg, Oral, 4 TIMES DAILY PRN    hydrochlorothiazide (MICROZIDE) 12.5 MG capsule take 1 capsule by mouth once daily    hydrOXYzine pamoate (VISTARIL) 25 mg, Oral, NIGHTLY PRN    isosorbide mononitrate (IMDUR) 30 mg, Oral, DAILY    levothyroxine (SYNTHROID) 50 mcg, Oral, DAILY    lisinopril (PRINIVIL;ZESTRIL) 5 mg, Oral, DAILY    loratadine (CLARITIN) 10 mg, Oral, DAILY    Magic Mouthwash (MIRACLE MOUTHWASH) 10 mLs, Swish & Spit, 4 TIMES DAILY PRN, 1:1 Lidocaine,Nystatin,Diphenhydramine swish and spit.      metoprolol tartrate (LOPRESSOR) 25 mg, Oral, 2 TIMES DAILY    mirtazapine (REMERON) 7.5 mg, Oral, NIGHTLY    Multiple Vitamin (MULTI VITAMIN DAILY) TABS 1 tablet, Oral, DAILY, Take 1 tab po daily    nitroGLYCERIN (NITROSTAT) 0.4 mg, SubLINGual, EVERY 5 MIN PRN    OXYGEN 2 L/min, NIGHTLY    PARoxetine (PAXIL) 10 mg, Oral, NIGHTLY    potassium chloride (KLOR-CON M) 20 MEQ extended release tablet 20 mEq, Oral, 2 TIMES DAILY    simethicone (MYLICON) 932 mg, Oral, EVERY 6 HOURS PRN    vitamin D 4,000 Units, Oral, DAILY     Allergies   Allergen Reactions    Amlodipine Other (See Comments)     Pt says she got all the side effects      Sulfa Antibiotics     Cleocin [Clindamycin] Rash    Macrolides And Ketolides Rash     \"All mycins\"    Pcn [Penicillins] Rash    Tetracyclines & Related Rash    Zithromax [Azithromycin] Rash       ENT ROS: positive for - hearing change    General: The patient is found to be alert and normally responsive female. Hearing: grossly normal   Voice: Clear   Skin: The skin has normal colour and turgor. Face: The facial contour is symmetric at rest and with movement. Ears: The pinnae have normal contours. AD: EAC with cerumen, removed with curette, TM intact, no effusion/erythema/retraction   AS: EAC with cerumen, removed with alligator, TM intact, no effusion/erythema/retraction   Eye: The ocular movements are full and symmetric, the conjunctiva is unremarkable; sclera are anicteric, pupillary response is symmetric. No nystagmus is found. Nose:   The external nasal contour is normal  Anterior clear  The nares are patent without evidence of polyposis   Oral cavity:   Anterior clear  Neck: The neck has a normal contour; no masses are found on palpation        An electronic signature was used to authenticate this note.     --Trisha Gibson MD     8/4/2022 9:53 AM EDT

## 2022-08-05 NOTE — ED NOTES
[facility], able to transport pt back to nursing home. ETA is 2030.      Francine Payton RN  08/04/22 2007

## 2022-08-05 NOTE — ED NOTES
Pt arrived to ER accompanied by EMS. Pt on stretcher from [address]. Pt experienced mid sternal chest pain that radiates to the left side shortly after preforming physical therapy. Pt was given 4 baby aspirin and 2 nitro's while with EMS.       Nancy Galeazzi, RN  08/04/22 2003

## 2022-08-07 LAB
EKG ATRIAL RATE: 68 BPM
EKG P AXIS: -25 DEGREES
EKG P-R INTERVAL: 168 MS
EKG Q-T INTERVAL: 464 MS
EKG QRS DURATION: 122 MS
EKG QTC CALCULATION (BAZETT): 493 MS
EKG R AXIS: -53 DEGREES
EKG T AXIS: 79 DEGREES
EKG VENTRICULAR RATE: 68 BPM

## 2022-08-30 ENCOUNTER — OUTSIDE SERVICES (OUTPATIENT)
Dept: FAMILY MEDICINE CLINIC | Age: 87
End: 2022-08-30
Payer: MEDICARE

## 2022-08-30 DIAGNOSIS — L30.9 DERMATITIS: ICD-10-CM

## 2022-08-30 DIAGNOSIS — G30.1 LATE ONSET ALZHEIMER'S DISEASE WITHOUT BEHAVIORAL DISTURBANCE (HCC): ICD-10-CM

## 2022-08-30 DIAGNOSIS — I10 ESSENTIAL HYPERTENSION: ICD-10-CM

## 2022-08-30 DIAGNOSIS — F22 PARANOIA (HCC): ICD-10-CM

## 2022-08-30 DIAGNOSIS — F02.80 LATE ONSET ALZHEIMER'S DISEASE WITHOUT BEHAVIORAL DISTURBANCE (HCC): ICD-10-CM

## 2022-08-30 DIAGNOSIS — J41.1 MUCOPURULENT CHRONIC BRONCHITIS (HCC): Primary | ICD-10-CM

## 2022-08-30 PROCEDURE — 99309 SBSQ NF CARE MODERATE MDM 30: CPT | Performed by: FAMILY MEDICINE

## 2022-09-15 ENCOUNTER — HOSPITAL ENCOUNTER (INPATIENT)
Age: 87
LOS: 5 days | Discharge: HOME | DRG: 280 | End: 2022-09-20
Attending: EMERGENCY MEDICINE | Admitting: INTERNAL MEDICINE
Payer: MEDICARE

## 2022-09-15 ENCOUNTER — APPOINTMENT (OUTPATIENT)
Dept: CT IMAGING | Age: 87
DRG: 280 | End: 2022-09-15
Payer: MEDICARE

## 2022-09-15 ENCOUNTER — TELEPHONE (OUTPATIENT)
Dept: FAMILY MEDICINE CLINIC | Age: 87
End: 2022-09-15

## 2022-09-15 ENCOUNTER — APPOINTMENT (OUTPATIENT)
Dept: GENERAL RADIOLOGY | Age: 87
DRG: 280 | End: 2022-09-15
Payer: MEDICARE

## 2022-09-15 DIAGNOSIS — I50.9 ACUTE ON CHRONIC CONGESTIVE HEART FAILURE, UNSPECIFIED HEART FAILURE TYPE (HCC): Primary | ICD-10-CM

## 2022-09-15 PROBLEM — I71.20 THORACIC AORTIC ANEURYSM (HCC): Status: ACTIVE | Noted: 2022-09-15

## 2022-09-15 PROBLEM — I50.23 ACUTE ON CHRONIC SYSTOLIC CHF (CONGESTIVE HEART FAILURE) (HCC): Status: ACTIVE | Noted: 2022-09-15

## 2022-09-15 LAB
ABSOLUTE EOS #: <0.03 K/UL (ref 0–0.44)
ABSOLUTE IMMATURE GRANULOCYTE: 0.04 K/UL (ref 0–0.3)
ABSOLUTE LYMPH #: 0.89 K/UL (ref 1.1–3.7)
ABSOLUTE MONO #: 0.95 K/UL (ref 0.1–1.2)
ALBUMIN SERPL-MCNC: 4.3 G/DL (ref 3.5–5.2)
ALBUMIN/GLOBULIN RATIO: 0.9 (ref 1–2.5)
ALP BLD-CCNC: 88 U/L (ref 35–104)
ALT SERPL-CCNC: 24 U/L (ref 5–33)
ANION GAP SERPL CALCULATED.3IONS-SCNC: 11 MMOL/L (ref 9–17)
AST SERPL-CCNC: 44 U/L
BASOPHILS # BLD: 0 % (ref 0–2)
BASOPHILS ABSOLUTE: 0.03 K/UL (ref 0–0.2)
BILIRUB SERPL-MCNC: 0.4 MG/DL (ref 0.3–1.2)
BUN BLDV-MCNC: 14 MG/DL (ref 8–23)
BUN/CREAT BLD: 27 (ref 9–20)
CALCIUM SERPL-MCNC: 9.7 MG/DL (ref 8.6–10.4)
CHLORIDE BLD-SCNC: 89 MMOL/L (ref 98–107)
CO2: 26 MMOL/L (ref 20–31)
CREAT SERPL-MCNC: 0.52 MG/DL (ref 0.5–0.9)
D-DIMER QUANTITATIVE: 1.06 MG/L FEU (ref 0–0.59)
EOSINOPHILS RELATIVE PERCENT: 0 % (ref 1–4)
GFR AFRICAN AMERICAN: >60 ML/MIN
GFR NON-AFRICAN AMERICAN: >60 ML/MIN
GFR SERPL CREATININE-BSD FRML MDRD: ABNORMAL ML/MIN/{1.73_M2}
GLUCOSE BLD-MCNC: 146 MG/DL (ref 70–99)
HCT VFR BLD CALC: 38.5 % (ref 36.3–47.1)
HCT VFR BLD CALC: 42.2 % (ref 36.3–47.1)
HEMOGLOBIN: 12.9 G/DL (ref 11.9–15.1)
HEMOGLOBIN: 13.5 G/DL (ref 11.9–15.1)
IMMATURE GRANULOCYTES: 1 %
LACTIC ACID, SEPSIS: 1.5 MMOL/L (ref 0.5–1.9)
LACTIC ACID, SEPSIS: 1.7 MMOL/L (ref 0.5–1.9)
LYMPHOCYTES # BLD: 10 % (ref 24–43)
MAGNESIUM: 1.9 MG/DL (ref 1.6–2.6)
MCH RBC QN AUTO: 27.6 PG (ref 25.2–33.5)
MCHC RBC AUTO-ENTMCNC: 32 G/DL (ref 25.2–33.5)
MCV RBC AUTO: 86.3 FL (ref 82.6–102.9)
MONOCYTES # BLD: 11 % (ref 3–12)
MYOGLOBIN: 81 NG/ML (ref 25–58)
MYOGLOBIN: 91 NG/ML (ref 25–58)
NRBC AUTOMATED: 0 PER 100 WBC
PARTIAL THROMBOPLASTIN TIME: 30.6 SEC (ref 23.9–33.8)
PARTIAL THROMBOPLASTIN TIME: 51.1 SEC (ref 23.9–33.8)
PARTIAL THROMBOPLASTIN TIME: 51.7 SEC (ref 23.9–33.8)
PDW BLD-RTO: 14.4 % (ref 11.8–14.4)
PLATELET # BLD: 355 K/UL (ref 138–453)
PMV BLD AUTO: 9 FL (ref 8.1–13.5)
POTASSIUM SERPL-SCNC: 4.8 MMOL/L (ref 3.7–5.3)
PRO-BNP: 6055 PG/ML
RBC # BLD: 4.89 M/UL (ref 3.95–5.11)
SARS-COV-2, RAPID: NOT DETECTED
SEG NEUTROPHILS: 78 % (ref 36–65)
SEGMENTED NEUTROPHILS ABSOLUTE COUNT: 6.82 K/UL (ref 1.5–8.1)
SODIUM BLD-SCNC: 125 MMOL/L (ref 135–144)
SODIUM BLD-SCNC: 126 MMOL/L (ref 135–144)
SPECIMEN DESCRIPTION: NORMAL
TOTAL PROTEIN: 8.9 G/DL (ref 6.4–8.3)
TROPONIN, HIGH SENSITIVITY: 129 NG/L (ref 0–14)
TROPONIN, HIGH SENSITIVITY: 59 NG/L (ref 0–14)
TROPONIN, HIGH SENSITIVITY: 61 NG/L (ref 0–14)
WBC # BLD: 8.7 K/UL (ref 3.5–11.3)

## 2022-09-15 PROCEDURE — 99222 1ST HOSP IP/OBS MODERATE 55: CPT | Performed by: INTERNAL MEDICINE

## 2022-09-15 PROCEDURE — 85018 HEMOGLOBIN: CPT

## 2022-09-15 PROCEDURE — 2000000000 HC ICU R&B

## 2022-09-15 PROCEDURE — 87635 SARS-COV-2 COVID-19 AMP PRB: CPT

## 2022-09-15 PROCEDURE — 85014 HEMATOCRIT: CPT

## 2022-09-15 PROCEDURE — 6370000000 HC RX 637 (ALT 250 FOR IP): Performed by: NURSE PRACTITIONER

## 2022-09-15 PROCEDURE — 83874 ASSAY OF MYOGLOBIN: CPT

## 2022-09-15 PROCEDURE — 85730 THROMBOPLASTIN TIME PARTIAL: CPT

## 2022-09-15 PROCEDURE — 93005 ELECTROCARDIOGRAM TRACING: CPT | Performed by: EMERGENCY MEDICINE

## 2022-09-15 PROCEDURE — 94761 N-INVAS EAR/PLS OXIMETRY MLT: CPT

## 2022-09-15 PROCEDURE — 6360000002 HC RX W HCPCS: Performed by: INTERNAL MEDICINE

## 2022-09-15 PROCEDURE — 84484 ASSAY OF TROPONIN QUANT: CPT

## 2022-09-15 PROCEDURE — 84295 ASSAY OF SERUM SODIUM: CPT

## 2022-09-15 PROCEDURE — 6370000000 HC RX 637 (ALT 250 FOR IP): Performed by: EMERGENCY MEDICINE

## 2022-09-15 PROCEDURE — 83735 ASSAY OF MAGNESIUM: CPT

## 2022-09-15 PROCEDURE — 80053 COMPREHEN METABOLIC PANEL: CPT

## 2022-09-15 PROCEDURE — 2060000000 HC ICU INTERMEDIATE R&B

## 2022-09-15 PROCEDURE — 36415 COLL VENOUS BLD VENIPUNCTURE: CPT

## 2022-09-15 PROCEDURE — 83605 ASSAY OF LACTIC ACID: CPT

## 2022-09-15 PROCEDURE — 2580000003 HC RX 258: Performed by: INTERNAL MEDICINE

## 2022-09-15 PROCEDURE — 2709999900 CT CHEST PULMONARY EMBOLISM W CONTRAST

## 2022-09-15 PROCEDURE — 6360000002 HC RX W HCPCS: Performed by: EMERGENCY MEDICINE

## 2022-09-15 PROCEDURE — 6370000000 HC RX 637 (ALT 250 FOR IP)

## 2022-09-15 PROCEDURE — 71260 CT THORAX DX C+: CPT | Performed by: NURSE PRACTITIONER

## 2022-09-15 PROCEDURE — 85379 FIBRIN DEGRADATION QUANT: CPT

## 2022-09-15 PROCEDURE — 6360000002 HC RX W HCPCS: Performed by: NURSE PRACTITIONER

## 2022-09-15 PROCEDURE — 6370000000 HC RX 637 (ALT 250 FOR IP): Performed by: INTERNAL MEDICINE

## 2022-09-15 PROCEDURE — 94640 AIRWAY INHALATION TREATMENT: CPT

## 2022-09-15 PROCEDURE — 6360000004 HC RX CONTRAST MEDICATION: Performed by: NURSE PRACTITIONER

## 2022-09-15 PROCEDURE — 83880 ASSAY OF NATRIURETIC PEPTIDE: CPT

## 2022-09-15 PROCEDURE — 71045 X-RAY EXAM CHEST 1 VIEW: CPT

## 2022-09-15 PROCEDURE — 85025 COMPLETE CBC W/AUTO DIFF WBC: CPT

## 2022-09-15 PROCEDURE — 2500000003 HC RX 250 WO HCPCS: Performed by: INTERNAL MEDICINE

## 2022-09-15 PROCEDURE — 99285 EMERGENCY DEPT VISIT HI MDM: CPT

## 2022-09-15 PROCEDURE — 2700000000 HC OXYGEN THERAPY PER DAY

## 2022-09-15 PROCEDURE — 87040 BLOOD CULTURE FOR BACTERIA: CPT

## 2022-09-15 RX ORDER — BETHANECHOL CHLORIDE 25 MG/1
25 TABLET ORAL 4 TIMES DAILY
Status: DISCONTINUED | OUTPATIENT
Start: 2022-09-15 | End: 2022-09-20 | Stop reason: HOSPADM

## 2022-09-15 RX ORDER — FLUTICASONE PROPIONATE 50 MCG
1 SPRAY, SUSPENSION (ML) NASAL DAILY
Status: DISCONTINUED | OUTPATIENT
Start: 2022-09-16 | End: 2022-09-20 | Stop reason: HOSPADM

## 2022-09-15 RX ORDER — HEPARIN SODIUM 10000 [USP'U]/100ML
5-30 INJECTION, SOLUTION INTRAVENOUS CONTINUOUS
Status: DISCONTINUED | OUTPATIENT
Start: 2022-09-15 | End: 2022-09-17

## 2022-09-15 RX ORDER — ALPRAZOLAM 0.25 MG/1
0.25 TABLET ORAL ONCE
Status: COMPLETED | OUTPATIENT
Start: 2022-09-15 | End: 2022-09-15

## 2022-09-15 RX ORDER — POTASSIUM CHLORIDE 20 MEQ/1
20 TABLET, EXTENDED RELEASE ORAL 2 TIMES DAILY
Status: DISCONTINUED | OUTPATIENT
Start: 2022-09-15 | End: 2022-09-20 | Stop reason: HOSPADM

## 2022-09-15 RX ORDER — ENOXAPARIN SODIUM 100 MG/ML
40 INJECTION SUBCUTANEOUS DAILY
Status: DISCONTINUED | OUTPATIENT
Start: 2022-09-15 | End: 2022-09-15 | Stop reason: ALTCHOICE

## 2022-09-15 RX ORDER — ACETAMINOPHEN 325 MG/1
650 TABLET ORAL EVERY 4 HOURS PRN
Status: DISCONTINUED | OUTPATIENT
Start: 2022-09-15 | End: 2022-09-20 | Stop reason: HOSPADM

## 2022-09-15 RX ORDER — LISINOPRIL 5 MG/1
5 TABLET ORAL DAILY
Status: DISCONTINUED | OUTPATIENT
Start: 2022-09-15 | End: 2022-09-20 | Stop reason: HOSPADM

## 2022-09-15 RX ORDER — CETIRIZINE HYDROCHLORIDE 5 MG/1
5 TABLET ORAL DAILY
Status: DISCONTINUED | OUTPATIENT
Start: 2022-09-16 | End: 2022-09-20 | Stop reason: HOSPADM

## 2022-09-15 RX ORDER — HEPARIN SODIUM 1000 [USP'U]/ML
60 INJECTION, SOLUTION INTRAVENOUS; SUBCUTANEOUS PRN
Status: DISCONTINUED | OUTPATIENT
Start: 2022-09-15 | End: 2022-09-17

## 2022-09-15 RX ORDER — IPRATROPIUM BROMIDE AND ALBUTEROL SULFATE 2.5; .5 MG/3ML; MG/3ML
1 SOLUTION RESPIRATORY (INHALATION)
Status: DISCONTINUED | OUTPATIENT
Start: 2022-09-15 | End: 2022-09-20 | Stop reason: HOSPADM

## 2022-09-15 RX ORDER — ACETAMINOPHEN 325 MG/1
650 TABLET ORAL 2 TIMES DAILY
Status: ON HOLD | COMMUNITY
End: 2022-09-20 | Stop reason: HOSPADM

## 2022-09-15 RX ORDER — SODIUM CHLORIDE 1000 MG
1 TABLET, SOLUBLE MISCELLANEOUS
Status: DISCONTINUED | OUTPATIENT
Start: 2022-09-16 | End: 2022-09-19

## 2022-09-15 RX ORDER — HYDROCHLOROTHIAZIDE 12.5 MG/1
12.5 CAPSULE, GELATIN COATED ORAL DAILY
Status: DISCONTINUED | OUTPATIENT
Start: 2022-09-15 | End: 2022-09-15 | Stop reason: ALTCHOICE

## 2022-09-15 RX ORDER — MELATONIN
4000 DAILY
COMMUNITY

## 2022-09-15 RX ORDER — HYDROXYZINE HYDROCHLORIDE 25 MG/1
25 TABLET, FILM COATED ORAL NIGHTLY PRN
Status: DISCONTINUED | OUTPATIENT
Start: 2022-09-15 | End: 2022-09-20 | Stop reason: HOSPADM

## 2022-09-15 RX ORDER — SODIUM CHLORIDE FOR INHALATION 0.9 %
3 VIAL, NEBULIZER (ML) INHALATION
Status: DISCONTINUED | OUTPATIENT
Start: 2022-09-15 | End: 2022-09-17

## 2022-09-15 RX ORDER — PAROXETINE HYDROCHLORIDE 20 MG/1
10 TABLET, FILM COATED ORAL NIGHTLY
Status: DISCONTINUED | OUTPATIENT
Start: 2022-09-15 | End: 2022-09-20 | Stop reason: HOSPADM

## 2022-09-15 RX ORDER — SODIUM CHLORIDE 0.9 % (FLUSH) 0.9 %
10 SYRINGE (ML) INJECTION EVERY 12 HOURS SCHEDULED
Status: DISCONTINUED | OUTPATIENT
Start: 2022-09-15 | End: 2022-09-20 | Stop reason: HOSPADM

## 2022-09-15 RX ORDER — ALBUTEROL SULFATE 2.5 MG/3ML
2.5 SOLUTION RESPIRATORY (INHALATION)
Status: DISCONTINUED | OUTPATIENT
Start: 2022-09-15 | End: 2022-09-17

## 2022-09-15 RX ORDER — HYDROCHLOROTHIAZIDE 12.5 MG/1
12.5 TABLET ORAL DAILY
Status: DISCONTINUED | OUTPATIENT
Start: 2022-09-15 | End: 2022-09-17

## 2022-09-15 RX ORDER — METOPROLOL TARTRATE 5 MG/5ML
5 INJECTION INTRAVENOUS EVERY 6 HOURS
Status: DISCONTINUED | OUTPATIENT
Start: 2022-09-15 | End: 2022-09-19

## 2022-09-15 RX ORDER — FUROSEMIDE 10 MG/ML
20 INJECTION INTRAMUSCULAR; INTRAVENOUS ONCE
Status: COMPLETED | OUTPATIENT
Start: 2022-09-15 | End: 2022-09-15

## 2022-09-15 RX ORDER — ALPRAZOLAM 0.25 MG/1
0.25 TABLET ORAL EVERY 6 HOURS PRN
Status: DISCONTINUED | OUTPATIENT
Start: 2022-09-15 | End: 2022-09-20 | Stop reason: HOSPADM

## 2022-09-15 RX ORDER — IPRATROPIUM BROMIDE AND ALBUTEROL SULFATE 2.5; .5 MG/3ML; MG/3ML
SOLUTION RESPIRATORY (INHALATION)
Status: COMPLETED
Start: 2022-09-15 | End: 2022-09-15

## 2022-09-15 RX ORDER — ASPIRIN 81 MG/1
81 TABLET, CHEWABLE ORAL DAILY
Status: DISCONTINUED | OUTPATIENT
Start: 2022-09-15 | End: 2022-09-20 | Stop reason: HOSPADM

## 2022-09-15 RX ORDER — ONDANSETRON 2 MG/ML
4 INJECTION INTRAMUSCULAR; INTRAVENOUS EVERY 6 HOURS PRN
Status: DISCONTINUED | OUTPATIENT
Start: 2022-09-15 | End: 2022-09-20 | Stop reason: HOSPADM

## 2022-09-15 RX ORDER — SODIUM CHLORIDE 1000 MG
1 TABLET, SOLUBLE MISCELLANEOUS
Status: DISCONTINUED | OUTPATIENT
Start: 2022-09-15 | End: 2022-09-15

## 2022-09-15 RX ORDER — HEPARIN SODIUM 1000 [USP'U]/ML
30 INJECTION, SOLUTION INTRAVENOUS; SUBCUTANEOUS PRN
Status: DISCONTINUED | OUTPATIENT
Start: 2022-09-15 | End: 2022-09-17

## 2022-09-15 RX ORDER — BUSPIRONE HYDROCHLORIDE 5 MG/1
7.5 TABLET ORAL NIGHTLY
Status: DISCONTINUED | OUTPATIENT
Start: 2022-09-15 | End: 2022-09-20 | Stop reason: HOSPADM

## 2022-09-15 RX ORDER — NITROGLYCERIN 0.4 MG/1
0.4 TABLET SUBLINGUAL EVERY 5 MIN PRN
Status: DISCONTINUED | OUTPATIENT
Start: 2022-09-15 | End: 2022-09-20 | Stop reason: HOSPADM

## 2022-09-15 RX ORDER — ACETAMINOPHEN 500 MG
500 TABLET ORAL EVERY 6 HOURS PRN
COMMUNITY

## 2022-09-15 RX ORDER — LOPERAMIDE HYDROCHLORIDE 2 MG/1
2 CAPSULE ORAL 3 TIMES DAILY PRN
COMMUNITY

## 2022-09-15 RX ORDER — ISOSORBIDE MONONITRATE 30 MG/1
30 TABLET, EXTENDED RELEASE ORAL DAILY
Status: DISCONTINUED | OUTPATIENT
Start: 2022-09-15 | End: 2022-09-19

## 2022-09-15 RX ORDER — SODIUM CHLORIDE 9 MG/ML
INJECTION, SOLUTION INTRAVENOUS CONTINUOUS
Status: DISCONTINUED | OUTPATIENT
Start: 2022-09-15 | End: 2022-09-16

## 2022-09-15 RX ORDER — MIRTAZAPINE 15 MG/1
7.5 TABLET, FILM COATED ORAL NIGHTLY
Status: DISCONTINUED | OUTPATIENT
Start: 2022-09-15 | End: 2022-09-20 | Stop reason: HOSPADM

## 2022-09-15 RX ORDER — ALBUTEROL SULFATE 2.5 MG/3ML
2.5 SOLUTION RESPIRATORY (INHALATION) EVERY 6 HOURS PRN
Status: DISCONTINUED | OUTPATIENT
Start: 2022-09-15 | End: 2022-09-20 | Stop reason: HOSPADM

## 2022-09-15 RX ORDER — HEPARIN SODIUM 1000 [USP'U]/ML
60 INJECTION, SOLUTION INTRAVENOUS; SUBCUTANEOUS ONCE
Status: COMPLETED | OUTPATIENT
Start: 2022-09-15 | End: 2022-09-15

## 2022-09-15 RX ORDER — FUROSEMIDE 10 MG/ML
20 INJECTION INTRAMUSCULAR; INTRAVENOUS 2 TIMES DAILY
Status: DISCONTINUED | OUTPATIENT
Start: 2022-09-15 | End: 2022-09-16

## 2022-09-15 RX ORDER — LEVOTHYROXINE SODIUM 0.03 MG/1
50 TABLET ORAL DAILY
Status: DISCONTINUED | OUTPATIENT
Start: 2022-09-16 | End: 2022-09-20 | Stop reason: HOSPADM

## 2022-09-15 RX ORDER — SODIUM CHLORIDE 9 MG/ML
INJECTION, SOLUTION INTRAVENOUS PRN
Status: DISCONTINUED | OUTPATIENT
Start: 2022-09-15 | End: 2022-09-20 | Stop reason: HOSPADM

## 2022-09-15 RX ORDER — METHYLPREDNISOLONE SODIUM SUCCINATE 125 MG/2ML
80 INJECTION, POWDER, LYOPHILIZED, FOR SOLUTION INTRAMUSCULAR; INTRAVENOUS EVERY 6 HOURS
Status: DISCONTINUED | OUTPATIENT
Start: 2022-09-16 | End: 2022-09-19

## 2022-09-15 RX ORDER — IPRATROPIUM BROMIDE AND ALBUTEROL SULFATE 2.5; .5 MG/3ML; MG/3ML
1 SOLUTION RESPIRATORY (INHALATION)
Status: DISCONTINUED | OUTPATIENT
Start: 2022-09-15 | End: 2022-09-15 | Stop reason: SDUPTHER

## 2022-09-15 RX ORDER — SODIUM CHLORIDE 0.9 % (FLUSH) 0.9 %
10 SYRINGE (ML) INJECTION PRN
Status: DISCONTINUED | OUTPATIENT
Start: 2022-09-15 | End: 2022-09-20 | Stop reason: HOSPADM

## 2022-09-15 RX ORDER — BUSPIRONE HYDROCHLORIDE 5 MG/1
10 TABLET ORAL EVERY MORNING
Status: DISCONTINUED | OUTPATIENT
Start: 2022-09-16 | End: 2022-09-20 | Stop reason: HOSPADM

## 2022-09-15 RX ORDER — METHYLPREDNISOLONE SODIUM SUCCINATE 125 MG/2ML
60 INJECTION, POWDER, LYOPHILIZED, FOR SOLUTION INTRAMUSCULAR; INTRAVENOUS ONCE
Status: COMPLETED | OUTPATIENT
Start: 2022-09-15 | End: 2022-09-15

## 2022-09-15 RX ADMIN — METHYLPREDNISOLONE SODIUM SUCCINATE 60 MG: 125 INJECTION, POWDER, FOR SOLUTION INTRAMUSCULAR; INTRAVENOUS at 21:34

## 2022-09-15 RX ADMIN — HEPARIN SODIUM 3290 UNITS: 1000 INJECTION INTRAVENOUS; SUBCUTANEOUS at 11:56

## 2022-09-15 RX ADMIN — PAROXETINE HYDROCHLORIDE 10 MG: 20 TABLET, FILM COATED ORAL at 21:32

## 2022-09-15 RX ADMIN — ALPRAZOLAM 0.25 MG: 0.25 TABLET ORAL at 21:32

## 2022-09-15 RX ADMIN — HEPARIN SODIUM 1650 UNITS: 1000 INJECTION INTRAVENOUS; SUBCUTANEOUS at 21:32

## 2022-09-15 RX ADMIN — SODIUM CHLORIDE, PRESERVATIVE FREE 10 ML: 5 INJECTION INTRAVENOUS at 18:30

## 2022-09-15 RX ADMIN — BETHANECHOL CHLORIDE 25 MG: 25 TABLET ORAL at 21:33

## 2022-09-15 RX ADMIN — POTASSIUM CHLORIDE 20 MEQ: 20 TABLET, EXTENDED RELEASE ORAL at 21:32

## 2022-09-15 RX ADMIN — ISOSORBIDE MONONITRATE 30 MG: 30 TABLET, EXTENDED RELEASE ORAL at 18:29

## 2022-09-15 RX ADMIN — ALPRAZOLAM 0.25 MG: 0.25 TABLET ORAL at 11:33

## 2022-09-15 RX ADMIN — IPRATROPIUM BROMIDE AND ALBUTEROL SULFATE 1 AMPULE: 2.5; .5 SOLUTION RESPIRATORY (INHALATION) at 08:30

## 2022-09-15 RX ADMIN — FUROSEMIDE 20 MG: 10 INJECTION, SOLUTION INTRAMUSCULAR; INTRAVENOUS at 19:44

## 2022-09-15 RX ADMIN — SODIUM CHLORIDE: 9 INJECTION, SOLUTION INTRAVENOUS at 19:41

## 2022-09-15 RX ADMIN — IOPAMIDOL 80 ML: 755 INJECTION, SOLUTION INTRAVENOUS at 21:19

## 2022-09-15 RX ADMIN — HYDROCHLOROTHIAZIDE 12.5 MG: 12.5 TABLET ORAL at 18:30

## 2022-09-15 RX ADMIN — ALBUTEROL SULFATE 2.5 MG: 2.5 SOLUTION RESPIRATORY (INHALATION) at 11:08

## 2022-09-15 RX ADMIN — IPRATROPIUM BROMIDE AND ALBUTEROL SULFATE 1 AMPULE: 2.5; .5 SOLUTION RESPIRATORY (INHALATION) at 19:57

## 2022-09-15 RX ADMIN — ALBUTEROL SULFATE 2.5 MG: 2.5 SOLUTION RESPIRATORY (INHALATION) at 18:23

## 2022-09-15 RX ADMIN — SODIUM CHLORIDE 1 G: 1 TABLET ORAL at 18:30

## 2022-09-15 RX ADMIN — METOPROLOL TARTRATE 5 MG: 1 INJECTION, SOLUTION INTRAVENOUS at 18:30

## 2022-09-15 RX ADMIN — BUSPIRONE HYDROCHLORIDE 7.5 MG: 5 TABLET ORAL at 21:33

## 2022-09-15 RX ADMIN — SODIUM CHLORIDE, PRESERVATIVE FREE 10 ML: 5 INJECTION INTRAVENOUS at 21:34

## 2022-09-15 RX ADMIN — SODIUM CHLORIDE, PRESERVATIVE FREE 10 ML: 5 INJECTION INTRAVENOUS at 19:44

## 2022-09-15 RX ADMIN — MIRTAZAPINE 7.5 MG: 15 TABLET, FILM COATED ORAL at 21:33

## 2022-09-15 RX ADMIN — LISINOPRIL 5 MG: 5 TABLET ORAL at 18:29

## 2022-09-15 RX ADMIN — FUROSEMIDE 20 MG: 10 INJECTION, SOLUTION INTRAMUSCULAR; INTRAVENOUS at 11:55

## 2022-09-15 RX ADMIN — ASPIRIN 81 MG CHEWABLE TABLET 81 MG: 81 TABLET CHEWABLE at 18:30

## 2022-09-15 RX ADMIN — HEPARIN SODIUM AND DEXTROSE 12 UNITS/KG/HR: 10000; 5 INJECTION INTRAVENOUS at 12:17

## 2022-09-15 RX ADMIN — IPRATROPIUM BROMIDE AND ALBUTEROL SULFATE 1 AMPULE: 2.5; .5 SOLUTION RESPIRATORY (INHALATION) at 16:12

## 2022-09-15 ASSESSMENT — PAIN - FUNCTIONAL ASSESSMENT
PAIN_FUNCTIONAL_ASSESSMENT: 0-10
PAIN_FUNCTIONAL_ASSESSMENT: NONE - DENIES PAIN

## 2022-09-15 ASSESSMENT — ENCOUNTER SYMPTOMS
EYE PAIN: 0
ABDOMINAL PAIN: 0
NAUSEA: 0
COUGH: 1
BACK PAIN: 0
VOMITING: 0
WHEEZING: 1
SHORTNESS OF BREATH: 1
DIARRHEA: 0

## 2022-09-15 ASSESSMENT — PAIN DESCRIPTION - PAIN TYPE: TYPE: ACUTE PAIN

## 2022-09-15 NOTE — PROGRESS NOTES
Back Spasm (No Trauma)    Spasm of the back muscles can occur after a sudden forceful twisting or bending force (such as in a car accident), after a simple awkward movement, or after lifting something heavy with poor body positioning. In any case, muscle spasm adds to the pain. Sleeping in an awkward position or on a poor quality mattress can also cause this. Some people respond to emotional stress by tensing the muscles of their back.  Pain that continues may need further evaluation or other types of treatment such as physical therapy.  You don't always need X-rays for the initial evaluation of back pain, unless you had a physical injury such as from a car accident or fall. If your pain continues and doesn't respond to medical treatment, X-rays and other tests may then be done.   Home care  · As soon as possible, start sitting or walking again to avoid problems from prolonged bed rest (muscle weakness, worsening back stiffness and pain, blood clots in the legs).  · When in bed, try to find a position of comfort. A firm mattress is best. Try lying flat on your back with pillows under your knees. You can also try lying on your side with your knees bent up toward your chest and a pillow between your knees.  · Avoid prolonged sitting, long car rides, or travel. This puts more stress on the lower back than standing or walking.   · During the first 24 to 72 hours after an injury or flare-up, apply an ice pack to the painful area for 20 minutes, then remove it for 20 minutes. Do this over a period of 60 to 90 minutes or several times a day. This will reduce swelling and pain. Always wrap ice packs in a thin towel.  · You can start with ice, then switch to heat. Heat (hot shower, hot bath, or heating pad) reduces pain, and works well for muscle spasms. Apply heat to the painful area for 20 minutes, then remove it for 20 minutes. Do this over a period of 60 to 90 minutes or several times a day. Do not sleep on a heating  1958 Dr. Alisson Jiang, cardiology on call, updated re: troponin 59 --> 61 -->129. Myoglobin 81 --> 91. Patient c/o dyspnea, currently denies chest pain. Vitals stable, on heparin IV gtt. Ordered prn SL nitro if c/o chest pain.  No additional orders, cardiology will see in AM. pad as it can burn or damage skin.  · Alternate ice and heat therapies.  · Be aware of safe lifting methods and do not lift anything over 15 pounds until all the pain is gone.  Gentle stretching will help your back heal faster. Do this simple routine 2 to 3 times a day until your back is feeling better.  · Lie on your back with your knees bent and both feet on the ground  · Slowly raise your left knee to your chest as you flatten your lower back against the floor. Hold for 20 to 30 seconds.  · Relax and repeat the exercise with your right knee.  · Do 2 to 3 of these exercises for each leg.  · Repeat, hugging both knees to your chest at the same time.  · Do not bounce, but use a gentle pull.  Medicines  Talk to your doctor before using medicine, especially if you have other medical problems or are taking other medicines.  You may use acetaminophen or ibuprofen to control pain, unless your healthcare provider prescribed another pain medicine. If you have a chronic condition such as diabetes, liver or kidney disease, stomach ulcer, or gastrointestinal bleeding, or are taking blood thinners, talk with your healthcare provider before taking any medicines.  Be careful if you are given prescription pain medicine, narcotics, or medicine for muscle spasm. They can cause drowsiness, affect your coordination, reflexes, or judgment. Do not drive or operate heavy machinery when taking these medicines. Take pain medicine only as prescribed by your healthcare provider.  Follow-up care  Follow up with your doctor, or as advised. Physical therapy or further tests may be needed.  If X-rays were taken, they may be reviewed by a radiologist. You will be notified of any new findings that may affect your care.  Call 911  Call 911 if any of these occur:  · Trouble breathing  · Confusion  · Drowsiness or trouble awakening  · Fainting or loss of consciousness  · Rapid or very slow heart rate  · Loss of bowel or bladder control  When to seek  medical advice  Call your healthcare provider right away if any of these occur:  · Pain becomes worse or spreads to your legs  · Weakness or numbness in one or both legs  · Numbness in the groin or genital area  · Fever of 100.4ºF (38ºC) or higher, or as directed by your healthcare provider  · Burning or pain when passing urine  Date Last Reviewed: 6/1/2016  © 6956-7256 [employer]. [address] [place] [address], [place] [address]. All rights reserved. This information is not intended as a substitute for professional medical care. Always follow your healthcare professional's instructions.

## 2022-09-15 NOTE — PROGRESS NOTES
09/15/22 1156   Encounter Summary   Service Provided For: Patient   Referral/Consult From: Nurse   Support System Adult Protective Services   Last Encounter  09/15/22   Complexity of Encounter High   Begin Time 1135   End Time  1158   Total Time Calculated 23 min   Crisis   Type Emotional distress   Spiritual/Emotional needs   Type Emotional Distress   Assessment/Intervention/Outcome   Assessment Angry; Fearful;Impaired resilience; Impaired social interaction   Intervention Active listening;Prayer (assurance of)/[place]   Outcome Deescalated;Engaged in conversation;Expressed feelings, needs, and concerns; Less anxious, Less agitated

## 2022-09-15 NOTE — PROGRESS NOTES
rounding on ED    Assessment: Nurse asked  to speak with a difficult patient. She has a legal guardian who is not present. Patient is insisting to go home, be admitted, see a doctor immediately. She is unable to accept reasoning.  spent time with her attempting to calm her.  assured her that she is getting best possible care but it takes time.  asked her to be patient with us and she would be helped. Patient began to quiet but still appears upset. Intervention: Engaged in conversation.  asked if he could pray for her. She calmed with a hand placed on her head for prayer. Patient expressed appreciation for visit and offer of continued prayer. Plan: Chaplains are available on site or on call 24/7 for spiritual and emotional support.

## 2022-09-15 NOTE — ED PROVIDER NOTES
[id number] [facility]  eMERGENCY dEPARTMENT eNCOUnter      Pt Name: Sammy Hightower  MRN: [id number]  [place] 12/6/1930  Date of evaluation: 9/15/2022      CHIEF COMPLAINT       Chief Complaint   Patient presents with    Shortness of Breath    Other     Hypoxia at [address], [address]    Sammy Hightower is a 80 y.o. female who presents chief complaint of shortness of breath, apparently has been going on for the last 4 days with a harsh cough she has a history of COPD apparently according to nursing staff at the nursing facility her sats were in the 70s when they evaluated this morning squad was called in route they gave her a DuoNeb placing her on oxygen her sats came up into the 90s although she still complains of dyspnea no chest pain      REVIEW OF SYSTEMS         Review of Systems   Constitutional:  Negative for chills and fever. HENT:  Negative for congestion and ear pain. Eyes:  Negative for pain and visual disturbance. Respiratory:  Positive for cough, shortness of breath and wheezing. Cardiovascular:  Positive for leg swelling. Negative for chest pain and palpitations. Gastrointestinal:  Negative for abdominal pain, diarrhea, nausea and vomiting. Endocrine: Negative for polydipsia and polyuria. Genitourinary:  Negative for difficulty urinating, dysuria and frequency. Musculoskeletal:  Negative for back pain, joint swelling, myalgias, neck pain and neck stiffness. Skin:  Negative for rash. Neurological:  Negative for dizziness, weakness and headaches. Hematological:  Negative for adenopathy. Does not bruise/bleed easily. Psychiatric/Behavioral:  Negative for confusion, self-injury and suicidal ideas.         PAST MEDICAL HISTORY    has a past medical history of Arthritis, Chronic kidney disease, COPD (chronic obstructive pulmonary disease) ([facility] [address] Dyspnea, Hypertension, Hyponatremia, Left bundle branch block, Paranoia ([facility] [address] and Substance abuse ([facility] SURGICAL HISTORY      has a past surgical history that includes other surgical history and Tonsillectomy and adenoidectomy.     CURRENT MEDICATIONS       Previous Medications    ACETAMINOPHEN (TYLENOL) 325 MG TABLET    Take 650 mg by mouth in the morning and at bedtime Indications: Pain    ACETAMINOPHEN (TYLENOL) 500 MG TABLET    Take 500 mg by mouth every 4 hours as needed for Pain or Fever    ACETAMINOPHEN (TYLENOL) 500 MG TABLET    Take 500 mg by mouth every 6 hours as needed for Pain    ACETYLCYSTEINE, NUTRIENT, 600 MG TABS    Take 2 tablets by mouth in the morning and at bedtime Indications: Neurotic Excoriation    ALBUTEROL (PROVENTIL) (2.5 MG/3ML) 0.083% NEBULIZER SOLUTION    Take 2.5 mg by nebulization 2 times daily    ALUMINUM & MAGNESIUM HYDROXIDE-SIMETHICONE (MAALOX REGULAR STRENGTH) 200-200-20 MG/5ML SUSP SUSPENSION    Take 30 mLs by mouth every 6 hours as needed for Indigestion    ASPIRIN 81 MG CHEWABLE TABLET    Take 1 tablet by mouth daily    BENZONATATE (TESSALON) 200 MG CAPSULE    Take 200 mg by mouth 3 times daily as needed for Cough    BETHANECHOL (URECHOLINE) 25 MG TABLET    Take 1 tablet by mouth 4 times daily    BUDESONIDE-FORMOTEROL (SYMBICORT) 80-4.5 MCG/ACT AERO    Inhale 2 puffs into the lungs 2 times daily    BUSPIRONE (BUSPAR) 10 MG TABLET    Take 1 tablet by mouth every morning    BUSPIRONE (BUSPAR) 7.5 MG TABLET    Take 7.5 mg by mouth nightly     CALCIUM CARBONATE (CALCIUM 600 PO)    Take 600 mg by mouth 2 times daily    CAMPHOR-MENTHOL (SARNA) 0.5-0.5 % LOTION    Apply to effected areas topically at bedtime for itching    CHOLECALCIFEROL (VITAMIN D) 2000 UNITS CAPS CAPSULE    Take 4,000 Units by mouth daily    DIPHENHYDRAMINE (BENADRYL) 25 MG TABLET    Take 25 mg by mouth every 6 hours as needed for Itching    DONEPEZIL (ARICEPT) 10 MG TABLET    Take 10 mg by mouth daily    FLUTICASONE (FLONASE) 50 MCG/ACT NASAL SPRAY    1 spray by Nasal route daily    GUAIFENESIN (ROBITUSSIN) 100 MG/5ML SYRUP    Take 10 mLs by mouth 4 times daily as needed for Cough or Congestion    HYDROCHLOROTHIAZIDE (MICROZIDE) 12.5 MG CAPSULE    take 1 capsule by mouth once daily    HYDROXYZINE (VISTARIL) 25 MG CAPSULE    Take 25 mg by mouth nightly as needed for Anxiety    ISOSORBIDE MONONITRATE (IMDUR) 30 MG EXTENDED RELEASE TABLET    Take 1 tablet by mouth daily    LEVOTHYROXINE (SYNTHROID) 50 MCG TABLET    Take 1 tablet by mouth Daily    LISINOPRIL (PRINIVIL;ZESTRIL) 5 MG TABLET    Take 1 tablet by mouth daily    LOPERAMIDE (IMODIUM) 2 MG CAPSULE    Take 2 mg by mouth 3 times daily as needed for Diarrhea    LORATADINE (CLARITIN) 10 MG CAPSULE    Take 1 capsule by mouth daily    MAGIC MOUTHWASH (MIRACLE MOUTHWASH)    Swish and spit 10 mLs 4 times daily as needed (MOUTH PAIN) 1:1 Lidocaine,Nystatin,Diphenhydramine swish and spit. METOPROLOL TARTRATE (LOPRESSOR) 25 MG TABLET    Take 1 tablet by mouth 2 times daily    MIRTAZAPINE (REMERON) 7.5 MG TABLET    Take 7.5 mg by mouth nightly     MULTIPLE VITAMIN (MULTI VITAMIN DAILY) TABS    Take 1 tablet by mouth daily Take 1 tab po daily    NITROGLYCERIN (NITROSTAT) 0.4 MG SL TABLET    Place 1 tablet under the tongue every 5 minutes as needed for Chest pain    OXYGEN    Inhale 2 L/min into the lungs nightly. PAROXETINE (PAXIL) 10 MG TABLET    Take 10 mg by mouth nightly    POTASSIUM CHLORIDE (KLOR-CON M) 20 MEQ EXTENDED RELEASE TABLET    Take 1 tablet by mouth 2 times daily    SIMETHICONE (MYLICON) 448 MG CHEWABLE TABLET    Take 125 mg by mouth every 6 hours as needed for Flatulence    VITAMIN D3 (CHOLECALCIFEROL) 25 MCG (1000 UT) TABS TABLET    Take 4,000 Units by mouth daily Indications: Supplement       ALLERGIES     is allergic to amlodipine, sulfa antibiotics, cleocin [clindamycin], macrolides and ketolides, pcn [penicillins], tetracyclines & related, and zithromax [azithromycin]. FAMILY HISTORY     She indicated that her mother is .  She indicated that her father is . She indicated that her sister is alive. She indicated that her brother is alive. She indicated that her maternal grandmother is . She indicated that her maternal grandfather is . She indicated that her paternal grandmother is . She indicated that her paternal grandfather is . family history includes Diabetes in her brother; High Blood Pressure in her sister. SOCIAL HISTORY      reports that she has never smoked. She has never used smokeless tobacco. She reports that she does not drink alcohol and does not use drugs. PHYSICAL EXAM     INITIAL VITALS:  height is 5' 4\" (1.626 m) and weight is 121 lb (54.9 kg). Her tympanic temperature is 97.4 °F (36.3 °C). Her blood pressure is 200/106 (abnormal) and her pulse is 107 (abnormal). Her respiration is 22 and oxygen saturation is 94%. Physical Exam  Constitutional:       Appearance: She is well-developed. HENT:      Head: Normocephalic and atraumatic. Right Ear: External ear normal.      Left Ear: External ear normal.   Eyes:      Conjunctiva/sclera: Conjunctivae normal.      Pupils: Pupils are equal, round, and reactive to light. Cardiovascular:      Rate and Rhythm: Normal rate and regular rhythm. Pulmonary:      Effort: Pulmonary effort is normal.      Breath sounds: Decreased breath sounds and wheezing present. No rhonchi or rales. Abdominal:      General: Bowel sounds are normal.      Palpations: Abdomen is soft. Musculoskeletal:         General: No tenderness. Cervical back: Normal range of motion. Right lower leg: No tenderness. No edema. Left lower leg: No tenderness. No edema. Comments: Trace lower extremity edema   Skin:     General: Skin is warm and dry. Neurological:      General: No focal deficit present. Mental Status: She is alert and oriented to person, place, and time.    Psychiatric:         Mood and Affect: Mood normal. Behavior: Behavior normal.             DIFFERENTIAL DIAGNOSIS/ MDM:     Acute shortness of breath, response to oxygen and aerosols we will do a work-up for CHF and COPD    DIAGNOSTIC RESULTS     EKG: All EKG's are interpreted by the Emergency Department Physician who either signs or Co-signs this chart in the absence of a cardiologist.  EKG 1 shows a sinus tach with PACs rate of 112 bpm DC interval is 158 ms QRS durations 114 ms QT corrected 477 ms axis is -62 she has LVH with nonspecific interventricular block and some repolarization abnormalities this was compared to previous EKG other than rate there is no significant difference  Repeat EKG shows  Sinus rhythm with fusion complexes again rate is 93 bpm DC interval is 176 ms QRS durations 120 ms QT corrected 499 ms axis is -63 there is no acute changes note is for LVH with repolarization diffuse ST elevation seen on previous EKGs    RADIOLOGY:   I directly visualized the following  images and reviewed the radiologist interpretations:     EXAMINATION:   ONE XRAY VIEW OF THE CHEST       9/15/2022 9:03 am       COMPARISON:   08/04/2022 and 05/07/2018       HISTORY:   ORDERING SYSTEM PROVIDED HISTORY: shortness of breath   TECHNOLOGIST PROVIDED HISTORY:   shortness of breath   Reason for Exam: SOB       FINDINGS:   Cardiomediastinal contours are grossly stable. Lungs are hyperinflated with   scattered interstitial prominence, mildly increased at the lung bases. No   dense airspace consolidation. No evidence of pleural effusion or   pneumothorax. Impression   Scattered interstitial prominence, mildly increased at the lung bases, likely   chronic interstitial change with superimposed edema or infiltrate.                ED BEDSIDE ULTRASOUND:       LABS:  Labs Reviewed   CBC WITH AUTO DIFFERENTIAL - Abnormal; Notable for the following components:       Result Value    Seg Neutrophils 78 (*)     Lymphocytes 10 (*)     Eosinophils % 0 (*)     Immature Granulocytes 1 (*)     Absolute Lymph # 0.89 (*)     All other components within normal limits   COMPREHENSIVE METABOLIC PANEL W/ REFLEX TO MG FOR LOW K - Abnormal; Notable for the following components:    Glucose 146 (*)     Bun/Cre Ratio 27 (*)     Sodium 126 (*)     Chloride 89 (*)     AST 44 (*)     Total Protein 8.9 (*)     Albumin/Globulin Ratio 0.9 (*)     All other components within normal limits   MYOGLOBIN, SERUM - Abnormal; Notable for the following components:    Myoglobin 81 (*)     All other components within normal limits   BRAIN NATRIURETIC PEPTIDE - Abnormal; Notable for the following components:    Pro-BNP 6,055 (*)     All other components within normal limits   TROPONIN - Abnormal; Notable for the following components:    Troponin, High Sensitivity 59 (*)     All other components within normal limits   TROPONIN - Abnormal; Notable for the following components:    Troponin, High Sensitivity 61 (*)     All other components within normal limits   MYOGLOBIN, SERUM - Abnormal; Notable for the following components:    Myoglobin 91 (*)     All other components within normal limits   COVID-19, RAPID   CULTURE, BLOOD 1   CULTURE, BLOOD 1   LACTATE, SEPSIS   LACTATE, SEPSIS   APTT   APTT   APTT           EMERGENCY DEPARTMENT COURSE:   Vitals:    Vitals:    09/15/22 0822 09/15/22 0850   BP:  (!) 200/106   Pulse: (!) 107    Resp: 22    Temp: 97.4 °F (36.3 °C)    TempSrc: Tympanic    SpO2: 94%    Weight: 121 lb (54.9 kg)    Height: 5' 4\" (1.626 m)      -------------------------  BP: (!) 200/106, Temp: 97.4 °F (36.3 °C), Heart Rate: (!) 107, Resp: 22        Re-evaluation Notes    Patient is asking to be taken to an ER and she wants to go to the hospital although she is actually at one review of her history and discussion with the hospitalist this is a typical behavior for this patient I also discussed the case with her power of  John Encarnacion and informed him of the admission    CRITICAL CARE:   IP CONSULT TO CARDIOLOGY  IP CONSULT TO HOSPITALIST        CONSULTS:  Consulted with cardiology who reviewed the EKGs given the slight increase in her troponins should be treated with IV heparin    PROCEDURES:  None    FINAL IMPRESSION      1. Acute on chronic congestive heart failure, unspecified heart failure type ([facility] [address]          DISPOSITION/PLAN   DISPOSITION Admitted    Condition on Disposition    Stable    PATIENT REFERRED TO:  No follow-up provider specified. DISCHARGE MEDICATIONS:  New Prescriptions    No medications on file       (Please note that portions of this note were completed with a voice recognition program.  Efforts were made to edit the dictations but occasionally words are mis-transcribed.)    Severiano Pummel, MD,, MD, F.A.A.E.M.   Attending Emergency Physician                           Severiano Pummel, MD  09/15/22 3339

## 2022-09-15 NOTE — H&P
HOSPITALIST ADMISSION H&P      REASON FOR ADMISSION:       CHF---acute diastolic----dyspnea----hypoxia  ESTIMATED LENGTH OF STAY:    > 2 midnights--2-3 days    ATTENDING/ADMITTING PHYSICIAN: Dwight Chowdhury MD MD  PCP: Jacob Orr MD    HISTORY OF PRESENT ILLNESS:      The patient is a 80 y.o. female patient of Jacob Orr MD who presents with onset of dyspnea---hypoxia at [place] [facility]---O2 sats into the 76s by report---9.15.2022---hypertensive urgency BPs---required supplemental oxygen----troponin elevate 61--59---proBNP = 6055--likely Type II NSTEMI. Patient is a totally unreliable historian---known dementia with paranoia--delusions---phobic behavior present well before this hospitalization--has required an ECF for many years. /106 in ER    Na = 126 --> replacement therapy    COPD--chronic bronchitis       See below for additional PMH. Patient qdtn-dahsuxxzvv-axmnthqw-available records reviewed, including, but not limited to, ER reports--labs--imaging---EKGs---office records----personal notes. 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     Allergies:    Amlodipine, Sulfa antibiotics, Cleocin [clindamycin], Macrolides and ketolides, Pcn [penicillins], Tetracyclines & related, and Zithromax [azithromycin]    Social History:    reports that she has never smoked. She has never used smokeless tobacco. She reports that she does not drink alcohol and does not use drugs. Family History:   family history includes Diabetes in her brother; High Blood Pressure in her sister. REVIEW OF SYSTEMS:  See HPI and problem list; otherwise no other new complaints with respect to HEENT, neck, pulmonary, coronary, GI, , endocrine, musculoskeletal, immune system/connective tissue disease, hematologic, neuropsych, skin, lymphatics, or malignancies. PHYSICAL EXAM:  Vitals:  BP (!) 200/106   Pulse (!) 107   Temp 97.4 °F (36.3 °C) (Tympanic)   Resp 22   Ht 5' 4\" (1.626 m)   Wt 121 lb (54.9 kg)   SpO2 94%   BMI 20.77 kg/m²     HEENT:  O2 NC---, Normocephalic, and Atraumatic  Neck: Supple, No Masses, Tenderness, Nodularity, and No Lymphadenopathy  Chest/Lungs: Prolonged Expiratory Phase, Poor Air Movement, and Distant Breath Sounds  Cardiac: Regular Rate and Rhythm  GI/Abdomen:  Bowel Sounds Present and Soft, Non-tender, without Guarding or Rebound Tenderness  : Not examined  EXT/Skin: No Cyanosis, No Clubbing, and Edema Present----multiple excoriations---inflammation  Neuro:  alert---not A&O x 4---generalized weakness        LABS:    CBC with Differential:    Lab Results   Component Value Date/Time    WBC 8.7 09/15/2022 08:39 AM    RBC 4.89 09/15/2022 08:39 AM    HGB 13.5 09/15/2022 08:39 AM    HCT 42.2 09/15/2022 08:39 AM     09/15/2022 08:39 AM    MCV 86.3 09/15/2022 08:39 AM    MCH 27.6 09/15/2022 08:39 AM    MCHC 32.0 09/15/2022 08:39 AM    RDW 14.4 09/15/2022 08:39 AM    LYMPHOPCT 10 09/15/2022 08:39 AM    MONOPCT 11 09/15/2022 08:39 AM    BASOPCT 0 09/15/2022 08:39 AM    MONOSABS 0.95 09/15/2022 08:39 AM    LYMPHSABS 0.89 09/15/2022 08:39 AM    EOSABS <0.03 09/15/2022 08:39 AM    BASOSABS 0.03 09/15/2022 08:39 AM    DIFFTYPE NOT REPORTED 05/09/2018 06:03 AM     CMP:    Lab Results   Component Value Date/Time     09/15/2022 08:39 AM    K 4.8 09/15/2022 08:39 AM    CL 89 09/15/2022 08:39 AM    CO2 26 09/15/2022 08:39 AM    BUN 14 09/15/2022 08:39 AM    CREATININE 0.52 09/15/2022 08:39 AM    CREATININE 0.7 05/14/2019 12:00 AM    GFRAA >60 09/15/2022 08:39 AM    LABGLOM >60 09/15/2022 08:39 AM    GLUCOSE 146 09/15/2022 08:39 AM    PROT 8.9 09/15/2022 08:39 AM    PROT 6.9 12/04/2012 02:41 PM    LABALBU 4.3 09/15/2022 08:39 AM    CALCIUM 9.7 09/15/2022 08:39 AM    BILITOT 0.4 09/15/2022 08:39 AM    ALKPHOS 88 09/15/2022 08:39 AM    AST 44 09/15/2022 08:39 AM    ALT 24 09/15/2022 08:39 AM     BMP:    Lab Results   Component Value Date/Time     09/15/2022 08:39 AM    K 4.8 09/15/2022 08:39 AM    CL 89 09/15/2022 08:39 AM    CO2 26 09/15/2022 08:39 AM    BUN 14 09/15/2022 08:39 AM    LABALBU 4.3 09/15/2022 08:39 AM    CREATININE 0.52 09/15/2022 08:39 AM    CREATININE 0.7 05/14/2019 12:00 AM    CALCIUM 9.7 09/15/2022 08:39 AM    GFRAA >60 09/15/2022 08:39 AM    LABGLOM >60 09/15/2022 08:39 AM    GLUCOSE 146 09/15/2022 08:39 AM       ASSESSMENT:      Patient Active Problem List   Diagnosis    Essential hypertension    Chronic kidney disease    Paranoia ([facility] [address]    Dyspnea    Chest pain    Anxiety    Mixed hyperlipidemia    Gait difficulty    Dementia without behavioral disturbance ([facility] [address]    Vitamin D deficiency    Anemia    Dermatophytosis of nail    Mucopurulent chronic bronchitis (HCC)    Acute non intractable tension-type headache    Chest pain    Hypertensive urgency    Left bundle branch block    Hypertensive emergency    Ischemic chest pain ([facility] [address]    Acute on chronic systolic CHF (congestive heart failure) ([facility] [address]     Ramin Rhoades.         ?       91  WF  [bobo Ramachandran ---[facility];  [facility]---[facility]]  FULL CODE       HEPARIN continuous infusion    SUPPLEMENTAL OXYGEN     Anti-infectives:   --------                                            CHF---acute diastolic----9.15.2022 ProBNP--9.15.2022----6055   Dyspnea---hypoxia---9.15.2022   Hypertensive urgency---9.15.2022   Elevated troponin----9.15.2022---59-61   Type 2 NSTEMI---9.15.2022             CXR---9.15.2022---scattered interstitial prominence---mildly increased at                                   lung bases likely chronic interstitial change with superposed                                    edema or infiltrate              EKG---9.15.2022---SR--93--LAE--LBBB---old inferior infarction              EKG---9.15.2022---sinus tachycardia---112---PACs--LAD--LBBB---old inferior infarction              EKG---8.4.2022----NSR---68---LAD--LVH--LBBB---old inferior infarction  Hyponatremia     Prior:             2D ECHO---5.8.2018---mild concentric LVH--NLVSF--hyperdynamic LV--                        mild increased LVOT flow velocity---NRVSF---MAC--trivial MR--                        SOUMYA without stenosis--mild TR---RVSP ~ 40 mm Hg----normal AR                        3.1 cm---Grade I mild DD---LVEF > 65%            MI ruled out---5. 8.2018            EKG---5.7.2018---NSR--70---LAD----LBBB                      2D ECHO---2.17.2017--normal chamber sizes----NLVSF--                        NRVSF--MAC---trivial TR--SOUMYA--trivial AR----mild                         TR---RVSP ~ 29 mm Hg---Grade 1 DD----LVEF ~ 55%            Hypotension            Bradycardia   Hyponatremia     LBBB--see above   II/VI LISETH  Hypertension  Hyperlipidemia  COPD  CKD---Stage 2  Chronic mucopurulent bronchitis  Dementia---paranoia--delusions--phobic behavior  Gait-balance instability  Anemia----chronic  Anxiety  PMH:   tension headaches, dermatophytosis---toe nails, hyponatremia, substance                abuse---theophylline, Hypertensive urgency----2018, left sided chest pain---               facial numbness---aphasia----5. 7.2018, Vitamin D deficiency  PSH:    excision mass---LN right neck---age 13, T&A, UTI--- 2018--yeast, urinary               retention---cross--2018 Allergies:        clindamycin---rash, macrolides--all mycins--Zithromax--rash,                          penicillin-----rash, tetracyclines---rash  Intolerances:   Norvasc--amlodipine     Code Status:   FULL CODE------will need to discuss further with DPOA---  EAMON---9.15.2022--[-]    PLAN:      CHF---elevated troponin---CHF regimen--daily weight---IO---IV diuretics---2D ECHO---Cardiology    HTN----urgency---Lopressor 5 mg IV q6h with parameters    Hypoxia--supplemental oxygen    Skin abrasions---UNNA boot bilaterally    Dementia---monitor camera----reorient    Hyponatremia---sodium replacement    Home medications reviewed  8.      See orders     Note that over 50 minutes was spent in evaluation of the patient, review of the chart and pertinent records, discussion with family/staff, etc    Cb Taylor MD MD  12:40 PM  9/15/2022

## 2022-09-16 ENCOUNTER — APPOINTMENT (OUTPATIENT)
Dept: GENERAL RADIOLOGY | Age: 87
DRG: 280 | End: 2022-09-16
Payer: MEDICARE

## 2022-09-16 LAB
ABSOLUTE EOS #: <0.03 K/UL (ref 0–0.44)
ABSOLUTE IMMATURE GRANULOCYTE: <0.03 K/UL (ref 0–0.3)
ABSOLUTE LYMPH #: 0.61 K/UL (ref 1.1–3.7)
ABSOLUTE MONO #: 0.23 K/UL (ref 0.1–1.2)
ANION GAP SERPL CALCULATED.3IONS-SCNC: 12 MMOL/L (ref 9–17)
BASOPHILS # BLD: 0 % (ref 0–2)
BASOPHILS ABSOLUTE: <0.03 K/UL (ref 0–0.2)
BUN BLDV-MCNC: 13 MG/DL (ref 8–23)
BUN/CREAT BLD: 24 (ref 9–20)
CALCIUM SERPL-MCNC: 8.8 MG/DL (ref 8.6–10.4)
CHLORIDE BLD-SCNC: 90 MMOL/L (ref 98–107)
CO2: 27 MMOL/L (ref 20–31)
CREAT SERPL-MCNC: 0.54 MG/DL (ref 0.5–0.9)
EOSINOPHILS RELATIVE PERCENT: 0 % (ref 1–4)
GFR AFRICAN AMERICAN: >60 ML/MIN
GFR NON-AFRICAN AMERICAN: >60 ML/MIN
GFR SERPL CREATININE-BSD FRML MDRD: ABNORMAL ML/MIN/{1.73_M2}
GLUCOSE BLD-MCNC: 159 MG/DL (ref 70–99)
HCT VFR BLD CALC: 37.7 % (ref 36.3–47.1)
HEMOGLOBIN: 12.6 G/DL (ref 11.9–15.1)
IMMATURE GRANULOCYTES: 0 %
LV EF: 45 %
LVEF MODALITY: NORMAL
LYMPHOCYTES # BLD: 9 % (ref 24–43)
MCH RBC QN AUTO: 28.5 PG (ref 25.2–33.5)
MCHC RBC AUTO-ENTMCNC: 33.4 G/DL (ref 25.2–33.5)
MCV RBC AUTO: 85.3 FL (ref 82.6–102.9)
MONOCYTES # BLD: 3 % (ref 3–12)
NRBC AUTOMATED: 0 PER 100 WBC
PARTIAL THROMBOPLASTIN TIME: 48.9 SEC (ref 23.9–33.8)
PARTIAL THROMBOPLASTIN TIME: 48.9 SEC (ref 23.9–33.8)
PARTIAL THROMBOPLASTIN TIME: 55.7 SEC (ref 23.9–33.8)
PARTIAL THROMBOPLASTIN TIME: 72.7 SEC (ref 23.9–33.8)
PDW BLD-RTO: 14.1 % (ref 11.8–14.4)
PLATELET # BLD: 301 K/UL (ref 138–453)
PMV BLD AUTO: 8.9 FL (ref 8.1–13.5)
POTASSIUM SERPL-SCNC: 4 MMOL/L (ref 3.7–5.3)
RBC # BLD: 4.42 M/UL (ref 3.95–5.11)
SEG NEUTROPHILS: 88 % (ref 36–65)
SEGMENTED NEUTROPHILS ABSOLUTE COUNT: 5.99 K/UL (ref 1.5–8.1)
SODIUM BLD-SCNC: 129 MMOL/L (ref 135–144)
TROPONIN, HIGH SENSITIVITY: 113 NG/L (ref 0–14)
WBC # BLD: 6.9 K/UL (ref 3.5–11.3)

## 2022-09-16 PROCEDURE — 92610 EVALUATE SWALLOWING FUNCTION: CPT

## 2022-09-16 PROCEDURE — 6360000002 HC RX W HCPCS: Performed by: NURSE PRACTITIONER

## 2022-09-16 PROCEDURE — 85730 THROMBOPLASTIN TIME PARTIAL: CPT

## 2022-09-16 PROCEDURE — 36415 COLL VENOUS BLD VENIPUNCTURE: CPT

## 2022-09-16 PROCEDURE — 2580000003 HC RX 258: Performed by: INTERNAL MEDICINE

## 2022-09-16 PROCEDURE — 99222 1ST HOSP IP/OBS MODERATE 55: CPT | Performed by: INTERNAL MEDICINE

## 2022-09-16 PROCEDURE — 93306 TTE W/DOPPLER COMPLETE: CPT

## 2022-09-16 PROCEDURE — 6370000000 HC RX 637 (ALT 250 FOR IP): Performed by: INTERNAL MEDICINE

## 2022-09-16 PROCEDURE — 97165 OT EVAL LOW COMPLEX 30 MIN: CPT

## 2022-09-16 PROCEDURE — 6360000002 HC RX W HCPCS: Performed by: EMERGENCY MEDICINE

## 2022-09-16 PROCEDURE — 93005 ELECTROCARDIOGRAM TRACING: CPT | Performed by: INTERNAL MEDICINE

## 2022-09-16 PROCEDURE — 84484 ASSAY OF TROPONIN QUANT: CPT

## 2022-09-16 PROCEDURE — 2060000000 HC ICU INTERMEDIATE R&B

## 2022-09-16 PROCEDURE — 99232 SBSQ HOSP IP/OBS MODERATE 35: CPT | Performed by: INTERNAL MEDICINE

## 2022-09-16 PROCEDURE — 6370000000 HC RX 637 (ALT 250 FOR IP): Performed by: NURSE PRACTITIONER

## 2022-09-16 PROCEDURE — 6360000002 HC RX W HCPCS: Performed by: INTERNAL MEDICINE

## 2022-09-16 PROCEDURE — 2000000000 HC ICU R&B

## 2022-09-16 PROCEDURE — 94761 N-INVAS EAR/PLS OXIMETRY MLT: CPT

## 2022-09-16 PROCEDURE — 97161 PT EVAL LOW COMPLEX 20 MIN: CPT

## 2022-09-16 PROCEDURE — 94640 AIRWAY INHALATION TREATMENT: CPT

## 2022-09-16 PROCEDURE — 2700000000 HC OXYGEN THERAPY PER DAY

## 2022-09-16 PROCEDURE — 80048 BASIC METABOLIC PNL TOTAL CA: CPT

## 2022-09-16 PROCEDURE — 2500000003 HC RX 250 WO HCPCS: Performed by: NURSE PRACTITIONER

## 2022-09-16 PROCEDURE — 85025 COMPLETE CBC W/AUTO DIFF WBC: CPT

## 2022-09-16 RX ORDER — FUROSEMIDE 10 MG/ML
40 INJECTION INTRAMUSCULAR; INTRAVENOUS 2 TIMES DAILY
Status: DISCONTINUED | OUTPATIENT
Start: 2022-09-16 | End: 2022-09-20 | Stop reason: HOSPADM

## 2022-09-16 RX ORDER — CODEINE PHOSPHATE AND GUAIFENESIN 10; 100 MG/5ML; MG/5ML
5 SOLUTION ORAL EVERY 4 HOURS PRN
Status: DISCONTINUED | OUTPATIENT
Start: 2022-09-16 | End: 2022-09-20 | Stop reason: HOSPADM

## 2022-09-16 RX ADMIN — BETHANECHOL CHLORIDE 25 MG: 25 TABLET ORAL at 14:25

## 2022-09-16 RX ADMIN — IPRATROPIUM BROMIDE AND ALBUTEROL SULFATE 1 AMPULE: 2.5; .5 SOLUTION RESPIRATORY (INHALATION) at 20:15

## 2022-09-16 RX ADMIN — LEVOTHYROXINE SODIUM 50 MCG: 0.03 TABLET ORAL at 05:03

## 2022-09-16 RX ADMIN — SODIUM CHLORIDE 1 G: 1 TABLET ORAL at 10:24

## 2022-09-16 RX ADMIN — METOPROLOL TARTRATE 5 MG: 1 INJECTION, SOLUTION INTRAVENOUS at 05:03

## 2022-09-16 RX ADMIN — METOPROLOL TARTRATE 5 MG: 1 INJECTION, SOLUTION INTRAVENOUS at 18:21

## 2022-09-16 RX ADMIN — BETHANECHOL CHLORIDE 25 MG: 25 TABLET ORAL at 17:53

## 2022-09-16 RX ADMIN — FUROSEMIDE 40 MG: 10 INJECTION, SOLUTION INTRAMUSCULAR; INTRAVENOUS at 17:53

## 2022-09-16 RX ADMIN — IPRATROPIUM BROMIDE AND ALBUTEROL SULFATE 1 AMPULE: 2.5; .5 SOLUTION RESPIRATORY (INHALATION) at 16:20

## 2022-09-16 RX ADMIN — IPRATROPIUM BROMIDE AND ALBUTEROL SULFATE 1 AMPULE: 2.5; .5 SOLUTION RESPIRATORY (INHALATION) at 07:31

## 2022-09-16 RX ADMIN — LISINOPRIL 5 MG: 5 TABLET ORAL at 10:32

## 2022-09-16 RX ADMIN — BUSPIRONE HYDROCHLORIDE 7.5 MG: 5 TABLET ORAL at 20:43

## 2022-09-16 RX ADMIN — METOPROLOL TARTRATE 5 MG: 1 INJECTION, SOLUTION INTRAVENOUS at 23:16

## 2022-09-16 RX ADMIN — SODIUM CHLORIDE, PRESERVATIVE FREE 10 ML: 5 INJECTION INTRAVENOUS at 05:03

## 2022-09-16 RX ADMIN — PAROXETINE HYDROCHLORIDE 10 MG: 20 TABLET, FILM COATED ORAL at 20:45

## 2022-09-16 RX ADMIN — SODIUM CHLORIDE, PRESERVATIVE FREE 10 ML: 5 INJECTION INTRAVENOUS at 20:45

## 2022-09-16 RX ADMIN — SODIUM CHLORIDE, PRESERVATIVE FREE 10 ML: 5 INJECTION INTRAVENOUS at 02:51

## 2022-09-16 RX ADMIN — ALBUTEROL SULFATE 2.5 MG: 2.5 SOLUTION RESPIRATORY (INHALATION) at 18:18

## 2022-09-16 RX ADMIN — BUSPIRONE HYDROCHLORIDE 10 MG: 5 TABLET ORAL at 10:30

## 2022-09-16 RX ADMIN — SODIUM CHLORIDE, PRESERVATIVE FREE 10 ML: 5 INJECTION INTRAVENOUS at 10:17

## 2022-09-16 RX ADMIN — MIRTAZAPINE 7.5 MG: 15 TABLET, FILM COATED ORAL at 20:45

## 2022-09-16 RX ADMIN — SODIUM CHLORIDE: 9 INJECTION, SOLUTION INTRAVENOUS at 05:04

## 2022-09-16 RX ADMIN — POTASSIUM CHLORIDE 20 MEQ: 20 TABLET, EXTENDED RELEASE ORAL at 20:46

## 2022-09-16 RX ADMIN — METHYLPREDNISOLONE SODIUM SUCCINATE 80 MG: 125 INJECTION, POWDER, FOR SOLUTION INTRAMUSCULAR; INTRAVENOUS at 20:43

## 2022-09-16 RX ADMIN — SODIUM CHLORIDE 1 G: 1 TABLET ORAL at 17:53

## 2022-09-16 RX ADMIN — METHYLPREDNISOLONE SODIUM SUCCINATE 80 MG: 125 INJECTION, POWDER, FOR SOLUTION INTRAMUSCULAR; INTRAVENOUS at 02:51

## 2022-09-16 RX ADMIN — IPRATROPIUM BROMIDE AND ALBUTEROL SULFATE 1 AMPULE: 2.5; .5 SOLUTION RESPIRATORY (INHALATION) at 12:24

## 2022-09-16 RX ADMIN — POTASSIUM CHLORIDE 20 MEQ: 20 TABLET, EXTENDED RELEASE ORAL at 10:32

## 2022-09-16 RX ADMIN — FUROSEMIDE 40 MG: 10 INJECTION, SOLUTION INTRAMUSCULAR; INTRAVENOUS at 10:21

## 2022-09-16 RX ADMIN — HEPARIN SODIUM 1650 UNITS: 1000 INJECTION INTRAVENOUS; SUBCUTANEOUS at 15:46

## 2022-09-16 RX ADMIN — HEPARIN SODIUM 1650 UNITS: 1000 INJECTION INTRAVENOUS; SUBCUTANEOUS at 10:44

## 2022-09-16 RX ADMIN — ISOSORBIDE MONONITRATE 30 MG: 30 TABLET, EXTENDED RELEASE ORAL at 10:32

## 2022-09-16 RX ADMIN — HEPARIN SODIUM AND DEXTROSE 12.6 UNITS/KG/HR: 10000; 5 INJECTION INTRAVENOUS at 10:46

## 2022-09-16 RX ADMIN — BETHANECHOL CHLORIDE 25 MG: 25 TABLET ORAL at 10:32

## 2022-09-16 RX ADMIN — METHYLPREDNISOLONE SODIUM SUCCINATE 80 MG: 125 INJECTION, POWDER, FOR SOLUTION INTRAMUSCULAR; INTRAVENOUS at 15:47

## 2022-09-16 RX ADMIN — METHYLPREDNISOLONE SODIUM SUCCINATE 80 MG: 125 INJECTION, POWDER, FOR SOLUTION INTRAMUSCULAR; INTRAVENOUS at 10:15

## 2022-09-16 RX ADMIN — ASPIRIN 81 MG CHEWABLE TABLET 81 MG: 81 TABLET CHEWABLE at 10:32

## 2022-09-16 RX ADMIN — BETHANECHOL CHLORIDE 25 MG: 25 TABLET ORAL at 20:44

## 2022-09-16 RX ADMIN — HEPARIN SODIUM 1650 UNITS: 1000 INJECTION INTRAVENOUS; SUBCUTANEOUS at 04:35

## 2022-09-16 RX ADMIN — METOPROLOL TARTRATE 5 MG: 1 INJECTION, SOLUTION INTRAVENOUS at 14:25

## 2022-09-16 RX ADMIN — CETIRIZINE HYDROCHLORIDE 5 MG: 5 TABLET, FILM COATED ORAL at 10:32

## 2022-09-16 RX ADMIN — SODIUM CHLORIDE 1 G: 1 TABLET ORAL at 14:24

## 2022-09-16 NOTE — CONSULTS
[facility]    Consult                        Today's Date: 9/16/2022  Patient Name: Martha Perales  Date of admission: 9/15/2022  8:20 AM  Patient's age: 80 y. o., 12/6/1930  Admission Dx: Acute on chronic systolic CHF (congestive heart failure) ([facility]) [I50.23]  Acute on chronic congestive heart failure, unspecified heart failure type ([facility] [address] [I50.9]    Reason for Consult:  SOB    Requesting Physician: Fransico Costello MD    CHIEF COMPLAINT:  SOB. History Obtained From:  patient    HISTORY OF PRESENT ILLNESS:      The patient is a 80 y.o.  female who is admitted to the hospital for SOB  The patient is a poor historian. Known to have dementia. Nursing home resident. Presented with progressive SOB and hypoxia. No chest pain, no dizziness or syncope. Past Medical History:   has a past medical history of Arthritis, Chronic kidney disease, COPD (chronic obstructive pulmonary disease) ([facility] [address] Dyspnea, Hypertension, Hyponatremia, Left bundle branch block, Paranoia ([facility] [address] and [facility] Past Surgical History:   has a past surgical history that includes other surgical history and Tonsillectomy and adenoidectomy. Home Medications:    Prior to Admission medications    Medication Sig Start Date End Date Taking?  Authorizing Provider   acetaminophen (TYLENOL) 500 MG tablet Take 500 mg by mouth every 6 hours as needed for Pain    Historical Provider, MD   loperamide (IMODIUM) 2 MG capsule Take 2 mg by mouth 3 times daily as needed for Diarrhea    Historical Provider, MD   Acetylcysteine, Nutrient, 600 MG TABS Take 2 tablets by mouth in the morning and at bedtime Indications: Neurotic Excoriation    Historical Provider, MD   vitamin D3 (CHOLECALCIFEROL) 25 MCG (1000 UT) TABS tablet Take 4,000 Units by mouth daily Indications: Supplement    Historical Provider, MD   acetaminophen (TYLENOL) 325 MG tablet Take 650 mg by mouth in the morning and at bedtime Indications: Pain    Historical Provider, MD   diphenhydrAMINE (BENADRYL) 25 MG tablet Take 25 mg by mouth every 6 hours as needed for Itching    Historical Provider, MD   busPIRone (BUSPAR) 10 MG tablet Take 1 tablet by mouth every morning 5/11/22   Palmira Owusu MD   hydrOXYzine (VISTARIL) 25 MG capsule Take 25 mg by mouth nightly as needed for Anxiety    Historical Provider, MD   Magic Mouthwash (MIRACLE MOUTHWASH) Swish and spit 10 mLs 4 times daily as needed (MOUTH PAIN) 1:1 Lidocaine,Nystatin,Diphenhydramine swish and spit.     Historical Provider, MD   camphor-menthol PHYLICIA MCGOVERN [facility]) 0.5-0.5 % lotion Apply to effected areas topically at bedtime for itching    Historical Provider, MD   mirtazapine (REMERON) 7.5 MG tablet Take 7.5 mg by mouth nightly  2/20/21   Historical Provider, MD   busPIRone (BUSPAR) 7.5 MG tablet Take 7.5 mg by mouth nightly  2/27/21   Historical Provider, MD   albuterol (PROVENTIL) (2.5 MG/3ML) 0.083% nebulizer solution Take 2.5 mg by nebulization 2 times daily    Historical Provider, MD   acetaminophen (TYLENOL) 500 MG tablet Take 500 mg by mouth every 4 hours as needed for Pain or Fever    Historical Provider, MD   benzonatate (TESSALON) 200 MG capsule Take 200 mg by mouth 3 times daily as needed for Cough    Historical Provider, MD   guaiFENesin (ROBITUSSIN) 100 MG/5ML syrup Take 10 mLs by mouth 4 times daily as needed for Cough or Congestion    Historical Provider, MD   PARoxetine (PAXIL) 10 MG tablet Take 10 mg by mouth nightly    Historical Provider, MD   lisinopril (PRINIVIL;ZESTRIL) 5 MG tablet Take 1 tablet by mouth daily 5/10/18   Kary Gomez MD   metoprolol tartrate (LOPRESSOR) 25 MG tablet Take 1 tablet by mouth 2 times daily 5/9/18   Kary Gomez MD   bethanechol (URECHOLINE) 25 MG tablet Take 1 tablet by mouth 4 times daily 5/9/18   Kary Gomez MD   Multiple Vitamin (MULTI VITAMIN DAILY) TABS Take 1 tablet by mouth daily Take 1 tab po daily 4/4/17   Octavio Nova MD   fluticasone (FLONASE) 50 MCG/ACT nasal spray 1 spray by Nasal route daily 3/20/17   Natasha Beck MD   hydrochlorothiazide (MICROZIDE) 12.5 MG capsule take 1 capsule by mouth once daily 3/13/17   Natasha Beck MD   Calcium Carbonate (CALCIUM 600 PO) Take 600 mg by mouth 2 times daily    Historical Provider, MD   loratadine (CLARITIN) 10 MG capsule Take 1 capsule by mouth daily 3/9/17   Natasha Beck MD   isosorbide mononitrate (IMDUR) 30 MG extended release tablet Take 1 tablet by mouth daily 3/8/17   Natasha Beck MD   potassium chloride (KLOR-CON M) 20 MEQ extended release tablet Take 1 tablet by mouth 2 times daily 2/13/17   Natasha Beck MD   aspirin 81 MG chewable tablet Take 1 tablet by mouth daily 2/10/17   Natasha Beck MD   budesonide-formoterol [facility]) 80-4.5 MCG/ACT AERO Inhale 2 puffs into the lungs 2 times daily 1/25/17 9/15/22  Charles Beth MD   Cholecalciferol (VITAMIN D) 2000 UNITS CAPS capsule Take 4,000 Units by mouth daily    Historical Provider, MD   nitroGLYCERIN (NITROSTAT) 0.4 MG SL tablet Place 1 tablet under the tongue every 5 minutes as needed for Chest pain 12/5/16   Peggy Hunt MD   aluminum & magnesium hydroxide-simethicone (MAALOX REGULAR STRENGTH) 200-200-20 MG/5ML SUSP suspension Take 30 mLs by mouth every 6 hours as needed for Indigestion 11/17/16   Natasha Beck MD   levothyroxine (SYNTHROID) 50 MCG tablet Take 1 tablet by mouth Daily 6/20/16   Natasha Beck MD   donepezil (ARICEPT) 10 MG tablet Take 10 mg by mouth daily    Historical Provider, MD   simethicone (MYLICON) 857 MG chewable tablet Take 125 mg by mouth every 6 hours as needed for Flatulence    Historical Provider, MD   OXYGEN Inhale 2 L/min into the lungs nightly.     Historical Provider, MD       Allergies:  Amlodipine, Sulfa antibiotics, Cleocin [clindamycin], Macrolides and ketolides, Pcn [penicillins], Tetracyclines & related, and Zithromax [azithromycin]    Social History:   reports that she has never smoked. She has never used smokeless tobacco. She reports that she does not drink alcohol and does not use drugs. Family History: family history includes Diabetes in her brother; High Blood Pressure in her sister. No h/o sudden cardiac death. No for premature CAD    REVIEW OF SYSTEMS:    Constitutional: there has been no unanticipated weight loss. There's been Yes change in energy level, Yes change in activity level. Eyes: No visual changes or diplopia. No scleral icterus. ENT: No Headaches, hearing loss or vertigo. No mouth sores or sore throat. Cardiovascular: No chest pain, Yes dyspnea on exertion, No palpitations or No loss of consciousness. No cough, hemoptysis, No pleuritic pain, or phlebitis. Respiratory: No cough or wheezing, no sputum production. No hematemesis. Gastrointestinal: No abdominal pain, appetite loss, blood in stools. No change in bowel or bladder habits. Genitourinary: No dysuria, trouble voiding, or hematuria. Musculoskeletal:  No gait disturbance, No weakness or joint complaints. Integumentary: No rash or pruritis. Neurological: No headache, diplopia, change in muscle strength, numbness or tingling. No change in gait, balance, coordination, mood, affect, memory, mentation, behavior. Psychiatric: No anxiety, or depression. Endocrine: No temperature intolerance. No excessive thirst, fluid intake, or urination. No tremor. Hematologic/Lymphatic: No abnormal bruising or bleeding, blood clots or swollen lymph nodes. Allergic/Immunologic: No nasal congestion or hives. PHYSICAL EXAM:      BP (!) 154/92   Pulse 77   Temp 97.6 °F (36.4 °C) (Tympanic)   Resp 20   Ht 5' 4\" (1.626 m)   Wt 122 lb 8 oz (55.6 kg)   SpO2 96%   BMI 21.03 kg/m²    Constitutional and General Appearance: alert, cooperative, no distress, and appears stated age  HEENT: PERRL, no cervical lymphadenopathy. No masses palpable.  Normal oral mucosa  Respiratory:  Normal excursion and expansion without use of accessory muscles  Resp Auscultation: Good respiratory effort. No for increased work of breathing. On auscultation: clear to auscultation bilaterally  Cardiovascular: The apical impulse is not displaced  Heart tones are crisp and normal. regular S1 and S2.  Jugular venous pulsation Normal  The carotid upstroke is normal in amplitude and contour without delay or bruit  Peripheral pulses are symmetrical and full  Abdomen:  No masses or tenderness  Bowel sounds present  Extremities:   No Cyanosis or Clubbing   Lower extremity edema: No  Skin: Warm and dry  Neurological:  Alert and oriented. Moves all extremities well  No abnormalities of mood, affect, memory, mentation, or behavior are noted    DATA:    Diagnostics:    EKG: NSR. LBBB. Labs:     CBC:   Recent Labs     09/15/22  0839 09/15/22  1812 09/16/22  0322   WBC 8.7  --  6.9   HGB 13.5 12.9 12.6   HCT 42.2 38.5 37.7     --  301     BMP:   Recent Labs     09/15/22  0839 09/15/22  2100 09/16/22  0322   * 125* 129*   K 4.8  --  4.0   CO2 26  --  27   BUN 14  --  13   CREATININE 0.52  --  0.54   LABGLOM >60  --  >60   GLUCOSE 146*  --  159*     BNP: No results for input(s): BNP in the last 72 hours. PT/INR: No results for input(s): PROTIME, INR in the last 72 hours. APTT:  Recent Labs     09/16/22  0322 09/16/22  0900   APTT 48.9* 48.9*     CARDIAC ENZYMES:No results for input(s): CKTOTAL, CKMB, CKMBINDEX, TROPONINI in the last 72 hours. FASTING LIPID PANEL:  Lab Results   Component Value Date/Time    HDL 85 03/01/2017 07:48 AM    TRIG 66 03/01/2017 07:48 AM     LIVER PROFILE:  Recent Labs     09/15/22  0839   AST 44*   ALT 24   LABALBU 4.3       IMPRESSION/RECOMMENDATIONS:  Acute congestive heart failure. Will continue IV diuresis. Close follow on I/O and chart and basic metabolic profile. She does have elevated troponins. Abnormal baseline ECG. We will obtain an echo to assess her ejection fraction and rule out any structural heart disease. The patient has dementia. Poor historian. Per primary service and POA. The patient is DNR CCA and only medical therapy. We will hold on any invasive procedure. We will continue IV diuresis. Continue current medications. Ascending thoracic aortic aneurysm measuring 5.7 cm. Conservative therapy as outlined above. Discussed with patient and Nurse.     Adis Nam MD, MD  [facility] Cardiology Consult           [phone]

## 2022-09-16 NOTE — PROGRESS NOTES
Physical Therapy  Facility/Department: [facility]  PROGRESSIVE CARE  Physical Therapy Initial Assessment    Name: Sierra Up  : 1930  MRN: [id number]  Date of Service: 2022    Discharge Recommendations:  [address] with PT, Continue to assess pending progress          Patient Diagnosis(es): The encounter diagnosis was Acute on chronic congestive heart failure, unspecified heart failure type ([facility] [address] Past Medical History:  has a past medical history of Arthritis, Chronic kidney disease, COPD (chronic obstructive pulmonary disease) ([facility] [address] Dyspnea, Hypertension, Hyponatremia, Left bundle branch block, Paranoia ([facility] [address] and Substance abuse ([facility] [address] Past Surgical History:  has a past surgical history that includes other surgical history and Tonsillectomy and adenoidectomy. Assessment   Body Structures, Functions, Activity Limitations Requiring Skilled Therapeutic Intervention: Decreased functional mobility ; Decreased ADL status; Decreased ROM; Decreased endurance;Decreased strength;Decreased balance  Therapy Prognosis: Good  Decision Making: Low Complexity  Activity Tolerance  Activity Tolerance: Patient tolerated evaluation without incident     Plan   Plan  Plan: 1 time a day 7 days a week  Current Treatment Recommendations: Strengthening, Balance training, ROM, Functional mobility training, Transfer training, Gait training  Safety Devices  Type of Devices: Left in bed, Telesitter in use, Call light within reach, Bed alarm in place, Sitter present     Restrictions  Restrictions/Precautions  Restrictions/Precautions: General Precautions     Subjective   General  Patient assessed for rehabilitation services?: Yes         Social/Functional History  Social/Functional History  Type of Home: Facility ([place])  Home Layout: One level  Home Access: Level entry  Receives Help From: Personal care attendant  ADL Assistance: Needs assistance  Toileting: Needs assistance  Homemaking Assistance: Needs Frame for Short term goals: LOS  Short term goal 1: bed mobiltiy with CGA  Short term goal 2: Transfers with CGA  Short term goal 3: Amb x 10ft with RW and CGA  Patient Goals   Patient goals : Return to facility       Education         Therapy Time   Individual Concurrent Group Co-treatment   Time In [address]         Time Out 0150         Minutes [address], [address]

## 2022-09-16 NOTE — PROGRESS NOTES
[facility] Inpatient Speech Therapy  Phone: [phone]  Fax: [phone] EVALUATION    YOB: 1930  Gender: female  MRN:  [id number]  Referring Physician: No referring provider defined for this encounter. Diagnosis:  CHF  Secondary Diagnosis: Dysphagia  History of Present Illness/Injury: BSE ordered secondary to results of chest x-ray, indicating the following:   Impression   1. No evidence for acute pulmonary embolism. 2.  Patchy airspace disease in the lower lung fields for which multifocal   infection or aspiration are considerations. 3.  Thoracic aortic aneurysm measuring up to 5.7 cm in the ascending segment. Nurse noted no current concerns with intake. Patient is a poor historian and was unable to indicate if she was having difficulty swallowing.      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       Pain: [x]No     []Yes           Location:  N/A       Pain Rating (0-10 pain scale): 0       Pain Description:  N/A    TIME IN: 14:15  TIME OUT: 1435  TOTAL TIME: 20     Current Diet: [] NPO [x] Regular diet [] Soft and bite sized (Dysphagia III)  [] Minced and moist (Dysphagia II)   [] Pureed (Dysphagia I)  [] Liquidised         Current Liquids: [x] Thin [] Mildly Thick (Nectar thick)  [] Moderately Thick (Honey thick) [] Extremely Thick (Spoon-Pudding)    Respiratory Status:[] Independent [x] Nasal Cannula [] Oxygen Mask                  [] Tracheostomy [] Other:      [] Ventilator/Settings:    Behavioral Observation: [x] Alert [] Oriented [] Confused      [] Lethargic [] Dysarthric       [] Limited Direction Following        [] Agitated      [x] Other: followed basic commands    ORAL MECHANISM EVALUATION:               Comments:  Facial /Labial [x]WFL []Impaired []DNT    Lingual [x]WFL []Impaired []DNT    Dentition []WFL [x]Impaired []DNT Sporadic dentition   Velum [x]WFL []Impaired []DNT    Vocal Quality [x]WFL []Impaired []DNT    Sensation [x]WFL []Impaired []DNT    Cough []WFL [x]Impaired []DNT weak   Gag []WFL []Impaired [x]DNT    Other: []WFL []ImpaIred []DNT        PATIENT WAS EVALUATED USING:  [x] Thin liquid    [] Mildly thick (Nectar thick) liquid  [] Moderately thick (Honey thick) liquid/Liquidised  [] Extremely thick (Pudding thick) liquid  [x] Solid   [x] Soft and bite sized  [] Minced and moist  [x] Puree    ORAL PHASE: [x] WFL    [] Impaired:   [] Loss of bolus from lips    [] Impaired AP movement   [] Pocketing Left     [] Pocketing Right    [] Lingual Pumping    [] Impaired Mastication   [] Reduced Bolus Formation    [] Impaired Oral Initiation    [x] OTHER: extended time for mastication of regular solid secondary to dentition; given additional time she was able to masticate cracker fully. PHARYNGEAL PHASE:   [x] WFL: Pharyngeal phase appears WFLs, but cannot completely rule out pharyngeal phase deficits from a bedside swallow evaluation alone. [] Impaired:   [] Delayed Swallow     [] Decreased Hyolaryngeal Elevation   [] Audible Swallow   [] Suspected Pharyngeal Residue due to spontaneous multiple swallow. [] OTHER:    SIGNS AND SYMPTOMS OF LARYNGEAL PENETRATION / ASPIRATION:  [x] NO sign/symptoms of aspiration evident with this evaluation, but cannot rule out silent aspiration. [] Throat Clear    [] Immediate Cough  [] Delayed Cough  [] Wet Vocal Quality  [] Change in Pulmonary Status   [] OTHER    IMPRESSIONS: Patient presents with what appears to be a WFL oral stage of swallowing, with no overt signs or symptoms of aspiration/penetration.  Patient did have sporadic dentition, which resulted in a longer oral stage in order to effectively masticate harder food items (quentin cracker). Given additional time, patient was able to clear oral stage and utilize a lingual sweep and liquid wash for complete clearance of residuals. Cuing needed for small sips x1, as she had one instance in which she took large sequential swallows via straw. She appeared to have adequate hyolarygneal excursion based on palpation, but this cannot be completely determined via bedside alone. Silent aspiration also cannot be ruled out, with the recommendation to complete MBSS in order to fully assess oropharyngeal phases of swallowing. RECOMMENDATIONS: Recommending completion of MBSS, as silent aspiration cannot be ruled out from BSE alone. Until MBSS is able to be completed, recommending continuation of regular diet + thin liquids, with strategies indicated. Modified Barium Swallow:  [x] Is indicated to further assess         [] Is NOT indicated at this time; Will recommend as appropriate. DIET RECOMMENDATIONS: [] NPO [x] Regular diet [] Soft and bite sized (Dysphagia III)  [] Minced and moist (Dysphagia II)   [] Pureed (Dysphagia I)  [] Liquidised     LIQUID RECOMMENDATIONS:   [x] Thin [] Mildly Thick (Nectar thick)  [] Moderately Thick (Honey thick) [] Extremely Thick (Spoon-Pudding)    STRATEGIES:   [] Strategies pending MBS results. [x] Full upright position    [x] Small bite/sip   [] No Straw   [] Multiple Swallow  [] Chin tuck   [] Head turn  [] Pulmonary monitoring  [] Oral care after all meals  [] Supervision   [] Medication in applesauce   [] Direct 1:1 Supervision  [] Spoon all liquids  [x] Alternate solid / liquid  [] Limit distractions   [] Monitor for fatigue  [x] OTHER: intermittent supervision    Note that above recommended diet and safe swallowing strategies will stand until the MBSS can be completed.      PATIENT STRENGTHS:  [] Motivated    [x] Cooperative [] Good family support    [] Prior Level of Function [] OTHER:      EDUCATION: During BSE discussed use of safe swallowing strategies, including small sips, small bites and upright positioning. Will complete further education following MBSS. Method of Education:   [x] Discussion     [] Demonstration    [] Written     [] Other    Evaluation of Patients Response to Education:        [] Patient and or caregiver verbalized understanding  [] Patient and or Caregiver demonstrated without assistance  [x] Patient and or Caregiver demonstrated with assistance  [] Needs additional instruction to demonstrate understanding of education    PATIENT GOALS: [x] Pt did not state. Will further assess during treatment. [] Return to the least restricted diet possible     [] Return to previous level of function     [] OTHER:    PLAN / TREATMENT RECOMMENDATIONS:  [] No further speech therapy services indicated. [] Speech Therapy evaluation to assess speech, language, cognition and/or voice  [x] Recommendations pending MBS  [] Skilled SLP intervention on IP Rehab 1x per day 5 days per week. [] Skilled SLP intervention on acute care 3-5 x per week.   [] OTHER:           Electronically signed by: Romana Bombard M.S., Delmus Igo             Date:9/16/2022

## 2022-09-16 NOTE — PLAN OF CARE
Problem: Discharge Planning  Goal: Discharge to home or other facility with appropriate resources  9/16/2022 1950 by Preethi Allen RN  Outcome: Progressing  9/16/2022 1506 by Ryanne Garrison RN  Outcome: Progressing  Flowsheets (Taken 9/16/2022 1030)  Discharge to home or other facility with appropriate resources:   Identify barriers to discharge with patient and caregiver   Arrange for needed discharge resources and transportation as appropriate   Identify discharge learning needs (meds, wound care, etc)   Refer to discharge planning if patient needs post-hospital services based on physician order or complex needs related to functional status, cognitive ability or social support system     Problem: Safety - Adult  Goal: Free from fall injury  9/16/2022 1950 by Preethi Allen RN  Outcome: Progressing  Flowsheets (Taken 9/16/2022 1947)  Free From Fall Injury:   Instruct family/caregiver on patient safety   Based on caregiver fall risk screen, instruct family/caregiver to ask for assistance with transferring infant if caregiver noted to have fall risk factors  9/16/2022 1506 by Ryanne Garrison RN  Outcome: Progressing  Flowsheets (Taken 9/16/2022 1030)  Free From Fall Injury: Instruct family/caregiver on patient safety     Problem: ABCDS Injury Assessment  Goal: Absence of physical injury  9/16/2022 1950 by Preethi Allen RN  Outcome: Progressing  [address] (Taken 9/16/2022 1947)  Absence of Physical Injury: Implement safety measures based on patient assessment  9/16/2022 1506 by Ryanne Garrison RN  Outcome: Progressing  Flowsheets (Taken 9/16/2022 1030)  Absence of Physical Injury: Implement safety measures based on patient assessment     Problem: Confusion  Goal: Confusion, delirium, dementia, or psychosis is improved or at baseline  Description: INTERVENTIONS:  1.  Assess for possible contributors to thought disturbance, including medications, impaired vision or hearing, underlying metabolic abnormalities, dehydration, psychiatric diagnoses, and notify attending LIP  2. [place] high risk fall precautions, as indicated  3. Provide frequent short contacts to provide reality reorientation, refocusing and direction  4. Decrease environmental stimuli, including noise as appropriate  5. Monitor and intervene to maintain adequate nutrition, hydration, elimination, sleep and activity  6. If unable to ensure safety without constant attention obtain sitter and review sitter guidelines with assigned personnel  7.  Initiate Psychosocial CNS and Spiritual Care consult, as indicated  9/16/2022 1950 by Vinicius Fall RN  Outcome: Progressing  9/16/2022 1506 by Virgen Lyon RN  Outcome: Progressing  Flowsheets (Taken 9/16/2022 1030)  Effect of thought disturbance (confusion, delirium, dementia, or psychosis) are managed with adequate functional status:   Assess for contributors to thought disturbance, including medications, impaired vision or hearing, underlying metabolic abnormalities, dehydration, psychiatric diagnoses, notify [facility] high risk fall precautions, as indicated   Provide frequent short contacts to provide reality reorientation, refocusing and direction   Decrease environmental stimuli, including noise as appropriate     Problem: Chronic Conditions and Co-morbidities  Goal: Patient's chronic conditions and co-morbidity symptoms are monitored and maintained or improved  9/16/2022 1950 by Vinicius Fall RN  Outcome: Progressing  9/16/2022 1506 by Virgen Lyon RN  Outcome: Progressing  Flowsheets (Taken 9/16/2022 1030)  Care Plan - Patient's Chronic Conditions and Co-Morbidity Symptoms are Monitored and Maintained or Improved: Monitor and assess patient's chronic conditions and comorbid symptoms for stability, deterioration, or improvement

## 2022-09-16 NOTE — PLAN OF CARE
Problem: Discharge Planning  Goal: Discharge to home or other facility with appropriate resources  Outcome: Progressing  Flowsheets  Taken 9/15/2022 1805 by Evelin Harley RN  Discharge to home or other facility with appropriate resources:   Identify barriers to discharge with patient and caregiver   Arrange for needed discharge resources and transportation as appropriate   Identify discharge learning needs (meds, wound care, etc)   Refer to discharge planning if patient needs post-hospital services based on physician order or complex needs related to functional status, cognitive ability or social support system  Taken 9/15/2022 1803 by Evelin Harley RN  Discharge to home or other facility with appropriate resources:   Identify barriers to discharge with patient and caregiver   Identify discharge learning needs (meds, wound care, etc)   Refer to discharge planning if patient needs post-hospital services based on physician order or complex needs related to functional status, cognitive ability or social support system   Arrange for needed discharge resources and transportation as appropriate   Arrange for interpreters to assist at discharge as needed     Problem: Safety - Adult  Goal: Free from fall injury  Outcome: Progressing  Flowsheets (Taken 9/15/2022 2016)  Free From Fall Injury:   Instruct family/caregiver on patient safety   Based on caregiver fall risk screen, instruct family/caregiver to ask for assistance with transferring infant if caregiver noted to have fall risk factors     Problem: ABCDS Injury Assessment  Goal: Absence of physical injury  Outcome: Progressing  Flowsheets (Taken 9/15/2022 2016)  Absence of Physical Injury: Implement safety measures based on patient assessment     Problem: Confusion  Goal: Confusion, delirium, dementia, or psychosis is improved or at baseline  Description: INTERVENTIONS:  1.  Assess for possible contributors to thought disturbance, including medications, impaired vision or hearing, underlying metabolic abnormalities, dehydration, psychiatric diagnoses, and notify attending LIP  2. [place] high risk fall precautions, as indicated  3. Provide frequent short contacts to provide reality reorientation, refocusing and direction  4. Decrease environmental stimuli, including noise as appropriate  5. Monitor and intervene to maintain adequate nutrition, hydration, elimination, sleep and activity  6. If unable to ensure safety without constant attention obtain sitter and review sitter guidelines with assigned personnel  7.  Initiate Psychosocial CNS and Spiritual Care consult, as indicated  Outcome: Progressing  Flowsheets (Taken 9/15/2022 1805 by Alyssa Garcia RN)  Effect of thought disturbance (confusion, delirium, dementia, or psychosis) are managed with adequate functional status:   Assess for contributors to thought disturbance, including medications, impaired vision or hearing, underlying metabolic abnormalities, dehydration, psychiatric diagnoses, notify [facility] high risk fall precautions, as indicated   Provide frequent short contacts to provide reality reorientation, refocusing and direction   Decrease environmental stimuli, including noise as appropriate   Monitor and intervene to maintain adequate nutrition, hydration, elimination, sleep and activity   If unable to ensure safety without constant attention obtain sitter and review sitter guidelines with assigned personnel

## 2022-09-16 NOTE — PROGRESS NOTES
Hospitalist Progress Note    Patient:  Heather Millan     YOB: 1930    MRN: [id number]   Admit date: 9/15/2022     Acct: [de-identified]     PCP: Dayday Long MD    CC--Interval History:      CHF---seen by Cardiology---increasing diuretics----recommended medical management---no invasive procedures    Type 2 NSTEMI---heparin infusion    [facility] contacted---Code Status = DNR--CCA----no invasive procedures or treatments such as cardiac catheterizations or surgeries--medical management only     Na = 129 --> sodium replacement     Dyspnea--hypoxia---supplemental oxygen     See note below         All other ROS negative except noted in HPI    Diet:  ADULT DIET;  Regular    Medications:  Scheduled Meds:   furosemide  40 mg IntraVENous BID    sodium chloride flush  10 mL IntraVENous 2 times per day    ipratropium-albuterol  1 ampule Inhalation Q4H WA    aspirin  81 mg Oral Daily    bethanechol  25 mg Oral 4x Daily    mometasone-formoterol  2 puff Inhalation BID    busPIRone  10 mg Oral QAM    busPIRone  7.5 mg Oral Nightly    fluticasone  1 spray Nasal Daily    isosorbide mononitrate  30 mg Oral Daily    levothyroxine  50 mcg Oral Daily    lisinopril  5 mg Oral Daily    cetirizine  5 mg Oral Daily    mirtazapine  7.5 mg Oral Nightly    PARoxetine  10 mg Oral Nightly    potassium chloride  20 mEq Oral BID    metoprolol  5 mg IntraVENous Q6H    [Held by provider] hydroCHLOROthiazide  12.5 mg Oral Daily    sodium chloride  1 g Oral TID WC    methylPREDNISolone  80 mg IntraVENous Q6H     Continuous Infusions:   heparin (PORCINE) Infusion 12.6 Units/kg/hr (09/16/22 1046)    sodium chloride       PRN Meds:heparin (porcine), heparin (porcine), albuterol, sodium chloride flush, sodium chloride, ondansetron, acetaminophen, sodium chloride nebulizer, albuterol, hydrOXYzine HCl, nitroGLYCERIN, ALPRAZolam    Objective:  Labs:  CBC with Differential:    Lab Results   Component Value Date/Time    WBC 6.9 09/16/2022 03:22 AM RBC 4.42 09/16/2022 03:22 AM    HGB 12.6 09/16/2022 03:22 AM    HCT 37.7 09/16/2022 03:22 AM     09/16/2022 03:22 AM    MCV 85.3 09/16/2022 03:22 AM    MCH 28.5 09/16/2022 03:22 AM    MCHC 33.4 09/16/2022 03:22 AM    RDW 14.1 09/16/2022 03:22 AM    LYMPHOPCT 9 09/16/2022 03:22 AM    MONOPCT 3 09/16/2022 03:22 AM    BASOPCT 0 09/16/2022 03:22 AM    MONOSABS 0.23 09/16/2022 03:22 AM    LYMPHSABS 0.61 09/16/2022 03:22 AM    EOSABS <0.03 09/16/2022 03:22 AM    BASOSABS <0.03 09/16/2022 03:22 AM    DIFFTYPE NOT REPORTED 05/09/2018 06:03 AM     BMP:    Lab Results   Component Value Date/Time     09/16/2022 03:22 AM    K 4.0 09/16/2022 03:22 AM    CL 90 09/16/2022 03:22 AM    CO2 27 09/16/2022 03:22 AM    BUN 13 09/16/2022 03:22 AM    LABALBU 4.3 09/15/2022 08:39 AM    CREATININE 0.54 09/16/2022 03:22 AM    CREATININE 0.7 05/14/2019 12:00 AM    CALCIUM 8.8 09/16/2022 03:22 AM    GFRAA >60 09/16/2022 03:22 AM    LABGLOM >60 09/16/2022 03:22 AM    GLUCOSE 159 09/16/2022 03:22 AM           Physical Exam:  Vitals: BP (!) 154/92   Pulse 77   Temp 97.6 °F (36.4 °C) (Tympanic)   Resp 20   Ht 5' 4\" (1.626 m)   Wt 122 lb 8 oz (55.6 kg)   SpO2 96%   BMI 21.03 kg/m²   24 hour intake/output:  Intake/Output Summary (Last 24 hours) at 9/16/2022 1318  Last data filed at 9/16/2022 1106  Gross per 24 hour   Intake 1392.99 ml   Output 2275 ml   Net -882.01 ml     Last 3 weights: Wt Readings from Last 3 Encounters:   09/16/22 122 lb 8 oz (55.6 kg)   08/04/22 135 lb (61.2 kg)   08/04/22 126 lb 9.6 oz (57.4 kg)     HEENT:  O2 NC----, Normocephalic, and Atraumatic  Neck: Supple, No Masses, Tenderness, Nodularity, and No Lymphadenopathy  Chest/Lungs: Poor Air Movement and Distant Breath Sounds  Cardiac: Regular Rate and Rhythm  GI/Abdomen:  Bowel Sounds Present and Soft, Non-tender, without Guarding or Rebound Tenderness  : Not examined  EXT/Skin: No Cyanosis, No Clubbing, and Edema Present  Neuro:  alert---generalized weakness---severe dementia = baseline       Assessment:    Principal Problem:    Acute on chronic systolic CHF (congestive heart failure) ([facility])  Active Problems:    Acute on chronic congestive heart failure ([address]  Resolved Problems:    * No resolved hospital problems.  *    Rella Leeanna.         ?       89  WF  [bobo Ramachandran ---[facility];  [facility]---[facility]]  DNR-CCA       HEPARIN continuous infusion    SUPPLEMENTAL OXYGEN     Anti-infectives:   --------                                            CHF---acute diastolic----9.15.2022                                                    2D ECHO----9.16.2022---mild-to-moderate LVH--mildly reduced LVSF---                                                                     NRVSF---MAC--MV thickening--trivial MR--AV calcification----                                                                     adequate opening---mild TR---RVSP ~ 39 mm Hg--AR upper                                                                     limits normal 3.7 cm--IVC normal diameter and inspiratory collapse---                                                                     Grade II moderate DD---LVEF ~ 45%                                                    CTA chest--pulmonary--9.15.2022---no PE---patchy airspace                                                                   disease--lower lung fields--multifocal infection or aspiration                                                                   considerations-----up to 5.7 cm ascending thoracic aortic aneurysm                                                    Elevated d-dimer---9.15.2022                                                    ProBNP--9.15.2022----6055   Dyspnea---hypoxia---9.15.2022   Hypertensive urgency---9.15.2022   Elevated troponin----9.15.2022---59-61   Type 2 NSTEMI---9.15.2022             CXR---9.15.2022---scattered interstitial prominence---mildly increased at                                   lung replacement       2D ECHO completed----see above        Code status to [facility]       See orders     Electronically signed by Fransico Costello MD on 9/16/2022 at 1:18 PM    Hospitalist immune

## 2022-09-16 NOTE — PLAN OF CARE
Problem: Discharge Planning  Goal: Discharge to home or other facility with appropriate resources  Outcome: Progressing  Flowsheets (Taken 9/16/2022 1030)  Discharge to home or other facility with appropriate resources:   Identify barriers to discharge with patient and caregiver   Arrange for needed discharge resources and transportation as appropriate   Identify discharge learning needs (meds, wound care, etc)   Refer to discharge planning if patient needs post-hospital services based on physician order or complex needs related to functional status, cognitive ability or social support system     Problem: Safety - Adult  Goal: Free from fall injury  Outcome: Progressing  Flowsheets (Taken 9/16/2022 1030)  Free From Fall Injury: Instruct family/caregiver on patient safety   -Frequent rounding and bed alarm. Patient free from falls and injuries during this shift. Problem: ABCDS Injury Assessment  Goal: Absence of physical injury  Outcome: Progressing  Flowsheets (Taken 9/16/2022 1030)  Absence of Physical Injury: Implement safety measures based on patient assessment     Problem: Confusion  Goal: Confusion, delirium, dementia, or psychosis is improved or at baseline  Description: INTERVENTIONS:  1. Assess for possible contributors to thought disturbance, including medications, impaired vision or hearing, underlying metabolic abnormalities, dehydration, psychiatric diagnoses, and notify attending LIP  2. [place] high risk fall precautions, as indicated  3. Provide frequent short contacts to provide reality reorientation, refocusing and direction  4. Decrease environmental stimuli, including noise as appropriate  5. Monitor and intervene to maintain adequate nutrition, hydration, elimination, sleep and activity  6. If unable to ensure safety without constant attention obtain sitter and review sitter guidelines with assigned personnel  7.  Initiate Psychosocial CNS and Spiritual Care consult, as indicated  Outcome:

## 2022-09-16 NOTE — PROGRESS NOTES
Occupational Therapy  Facility/Department: [facility]  Occupational Therapy Initial Assessment    Name: Taniya Kidd  : 1930  MRN: [id number]  Date of Service: 2022    Discharge Recommendations:  Continue to assess pending progress, Subacute/Skilled Nursing Facility, [facility]       Patient Diagnosis(es): The encounter diagnosis was Acute on chronic congestive heart failure, unspecified heart failure type ([facility] Past Medical History:  has a past medical history of Arthritis, Chronic kidney disease, COPD (chronic obstructive pulmonary disease) ([facility] Dyspnea, Hypertension, Hyponatremia, Left bundle branch block, Paranoia ([facility] and Substance abuse ([facility] Past Surgical History:  has a past surgical history that includes other surgical history and Tonsillectomy and adenoidectomy. Treatment Diagnosis: Generalized weakness      Assessment   Performance deficits / Impairments: Decreased functional mobility ; Decreased endurance;Decreased ADL status; Decreased balance;Decreased strength;Decreased high-level IADLs;Decreased safe awareness;Decreased cognition  Treatment Diagnosis: Generalized weakness  Prognosis: Good  Decision Making: Low Complexity  REQUIRES OT FOLLOW-UP: Yes  Activity Tolerance  Activity Tolerance: Patient limited by fatigue;Treatment limited secondary to decreased cognition        Plan   Plan  Times per Week: 1-2     Restrictions  Restrictions/Precautions  Restrictions/Precautions: General Precautions    Subjective   General  Chart Reviewed: Yes  Patient assessed for rehabilitation services?: Yes  Family / Caregiver Present: No  Referring Practitioner: Rad Gill MD  Diagnosis: Acute on chronic systolic CHF  Subjective  Subjective: Pt rec'd lying in bed, pleasantly confused upon OT arrival. Oriented patient to situation and willing to participate in evaluation.      Social/Functional History  Social/Functional History  Type of Home: Facility ([facility]  Home Layout: One level  Home Access: Level entry  Receives Help From: Personal care attendant  ADL Assistance: Needs assistance  Toileting: Needs assistance  Homemaking Assistance: Needs assistance  Homemaking Responsibilities: No  Ambulation Assistance: Needs assistance  Transfer Assistance: Needs assistance       Objective   Heart Rate: 77  Heart Rate Source: Telemetry  BP: (!) 154/92  BP Location: Left upper arm  BP Method: Automatic  Patient Position: Semi fowlers; Lying right side  MAP (Calculated): 112.67  Resp: 20  SpO2: 96 %  O2 Device: Nasal cannula  Safety Devices  Type of Devices: Left in bed;Telesitter in use;Call light within reach; Bed alarm in place  AROM: Generally decreased, functional  Strength: Generally decreased, functional  ADL  Feeding: Stand by assistance  Grooming: Moderate assistance  UE Bathing: Moderate assistance  LE Bathing: Maximum assistance  UE Dressing: Moderate assistance  LE Dressing: Maximum assistance  Toileting: Moderate assistance  Toileting Skilled Clinical Factors: Pt completed toileting task at [facility] with use of FWW for functional transfer and Mod A for safety  Bed mobility  Supine to Sit: Minimal assistance  Sit to Supine: Minimal assistance  Scooting: Minimal assistance  Transfers  Stand Step Transfers: Moderate assistance  Sit to stand: Moderate assistance  Stand to sit: Moderate assistance  Transfer Comments: Pt completed functional transfer from EOB <> BSC with Mod A and use of FWW  Cognition  Overall Cognitive Status: Exceptions  Arousal/Alertness: Delayed responses to stimuli  Following Commands: Follows one step commands with increased time; Follows one step commands with repetition  Attention Span: Difficulty attending to directions  Memory: Decreased recall of biographical Information;Decreased recall of recent events;Decreased short term memory;Decreased long term memory  Safety Judgement: Decreased awareness of need for assistance  Problem Solving: Assistance required to generate solutions  Insights: Decreased awareness of deficits  Initiation: Requires cues for all  Sequencing: Requires cues for all  Orientation  Overall Orientation Status: Impaired  Orientation Level: Disoriented X4  Education Given To: Patient  Education Provided: Role of Therapy;Plan of Care  Education Method: Verbal  Barriers to Learning: Cognition  Education Outcome: Unable to verbalize    Goals  Short Term Goals  Time Frame for Short term goals: Duration of hospital stay  Short Term Goal 1: Pt will complete toilet transfer and toileting tasks with a/e as needed with CGA to prepare for D/C  Short Term Goal 2: Pt will complete grooming task while standing at sink with CGA to prepare for D/C       Therapy Time   Individual Concurrent Group Co-treatment   Time In 1335         Time Out 1350         Minutes 15         Timed Code Treatment Minutes: 0 Minutes       Nuha Sensor, OTD, OTR/L

## 2022-09-17 ENCOUNTER — APPOINTMENT (OUTPATIENT)
Dept: GENERAL RADIOLOGY | Age: 87
DRG: 280 | End: 2022-09-17
Payer: MEDICARE

## 2022-09-17 LAB
ABSOLUTE EOS #: <0.03 K/UL (ref 0–0.44)
ABSOLUTE IMMATURE GRANULOCYTE: 0.05 K/UL (ref 0–0.3)
ABSOLUTE LYMPH #: 0.75 K/UL (ref 1.1–3.7)
ABSOLUTE MONO #: 0.52 K/UL (ref 0.1–1.2)
ANION GAP SERPL CALCULATED.3IONS-SCNC: 7 MMOL/L (ref 9–17)
BASOPHILS # BLD: 0 % (ref 0–2)
BASOPHILS ABSOLUTE: <0.03 K/UL (ref 0–0.2)
BUN BLDV-MCNC: 15 MG/DL (ref 8–23)
BUN/CREAT BLD: 28 (ref 9–20)
CALCIUM SERPL-MCNC: 8.8 MG/DL (ref 8.6–10.4)
CHLORIDE BLD-SCNC: 92 MMOL/L (ref 98–107)
CO2: 34 MMOL/L (ref 20–31)
CREAT SERPL-MCNC: 0.54 MG/DL (ref 0.5–0.9)
EOSINOPHILS RELATIVE PERCENT: 0 % (ref 1–4)
GFR AFRICAN AMERICAN: >60 ML/MIN
GFR NON-AFRICAN AMERICAN: >60 ML/MIN
GFR SERPL CREATININE-BSD FRML MDRD: ABNORMAL ML/MIN/{1.73_M2}
GLUCOSE BLD-MCNC: 183 MG/DL (ref 70–99)
HCT VFR BLD CALC: 36 % (ref 36.3–47.1)
HEMOGLOBIN: 11.9 G/DL (ref 11.9–15.1)
IMMATURE GRANULOCYTES: 1 %
LYMPHOCYTES # BLD: 8 % (ref 24–43)
MCH RBC QN AUTO: 28.5 PG (ref 25.2–33.5)
MCHC RBC AUTO-ENTMCNC: 33.1 G/DL (ref 25.2–33.5)
MCV RBC AUTO: 86.3 FL (ref 82.6–102.9)
MONOCYTES # BLD: 6 % (ref 3–12)
NRBC AUTOMATED: 0 PER 100 WBC
PARTIAL THROMBOPLASTIN TIME: 66.2 SEC (ref 23.9–33.8)
PDW BLD-RTO: 14.3 % (ref 11.8–14.4)
PLATELET # BLD: 279 K/UL (ref 138–453)
PMV BLD AUTO: 9.1 FL (ref 8.1–13.5)
POTASSIUM SERPL-SCNC: 3.5 MMOL/L (ref 3.7–5.3)
RBC # BLD: 4.17 M/UL (ref 3.95–5.11)
SEG NEUTROPHILS: 86 % (ref 36–65)
SEGMENTED NEUTROPHILS ABSOLUTE COUNT: 8.13 K/UL (ref 1.5–8.1)
SODIUM BLD-SCNC: 133 MMOL/L (ref 135–144)
WBC # BLD: 9.5 K/UL (ref 3.5–11.3)

## 2022-09-17 PROCEDURE — 2060000000 HC ICU INTERMEDIATE R&B

## 2022-09-17 PROCEDURE — 6370000000 HC RX 637 (ALT 250 FOR IP): Performed by: NURSE PRACTITIONER

## 2022-09-17 PROCEDURE — 6360000002 HC RX W HCPCS: Performed by: INTERNAL MEDICINE

## 2022-09-17 PROCEDURE — 2000000000 HC ICU R&B

## 2022-09-17 PROCEDURE — 6360000002 HC RX W HCPCS: Performed by: NURSE PRACTITIONER

## 2022-09-17 PROCEDURE — 2580000003 HC RX 258: Performed by: INTERNAL MEDICINE

## 2022-09-17 PROCEDURE — 2500000003 HC RX 250 WO HCPCS: Performed by: NURSE PRACTITIONER

## 2022-09-17 PROCEDURE — 93005 ELECTROCARDIOGRAM TRACING: CPT | Performed by: INTERNAL MEDICINE

## 2022-09-17 PROCEDURE — 94640 AIRWAY INHALATION TREATMENT: CPT

## 2022-09-17 PROCEDURE — 85025 COMPLETE CBC W/AUTO DIFF WBC: CPT

## 2022-09-17 PROCEDURE — 94760 N-INVAS EAR/PLS OXIMETRY 1: CPT

## 2022-09-17 PROCEDURE — 80048 BASIC METABOLIC PNL TOTAL CA: CPT

## 2022-09-17 PROCEDURE — 71045 X-RAY EXAM CHEST 1 VIEW: CPT

## 2022-09-17 PROCEDURE — 85730 THROMBOPLASTIN TIME PARTIAL: CPT

## 2022-09-17 PROCEDURE — 6370000000 HC RX 637 (ALT 250 FOR IP): Performed by: INTERNAL MEDICINE

## 2022-09-17 PROCEDURE — 36415 COLL VENOUS BLD VENIPUNCTURE: CPT

## 2022-09-17 PROCEDURE — 2700000000 HC OXYGEN THERAPY PER DAY

## 2022-09-17 PROCEDURE — 99232 SBSQ HOSP IP/OBS MODERATE 35: CPT | Performed by: FAMILY MEDICINE

## 2022-09-17 PROCEDURE — 6360000002 HC RX W HCPCS: Performed by: FAMILY MEDICINE

## 2022-09-17 PROCEDURE — 6360000002 HC RX W HCPCS: Performed by: EMERGENCY MEDICINE

## 2022-09-17 RX ORDER — HEPARIN SODIUM 10000 [USP'U]/100ML
5-30 INJECTION, SOLUTION INTRAVENOUS CONTINUOUS
Status: DISCONTINUED | OUTPATIENT
Start: 2022-09-17 | End: 2022-09-17

## 2022-09-17 RX ORDER — ENOXAPARIN SODIUM 100 MG/ML
50 INJECTION SUBCUTANEOUS 2 TIMES DAILY
Status: DISCONTINUED | OUTPATIENT
Start: 2022-09-17 | End: 2022-09-18

## 2022-09-17 RX ORDER — POTASSIUM CHLORIDE 20 MEQ/1
20 TABLET, EXTENDED RELEASE ORAL ONCE
Status: COMPLETED | OUTPATIENT
Start: 2022-09-17 | End: 2022-09-17

## 2022-09-17 RX ADMIN — POTASSIUM CHLORIDE 20 MEQ: 20 TABLET, EXTENDED RELEASE ORAL at 20:11

## 2022-09-17 RX ADMIN — LEVOTHYROXINE SODIUM 50 MCG: 0.03 TABLET ORAL at 05:19

## 2022-09-17 RX ADMIN — CETIRIZINE HYDROCHLORIDE 5 MG: 5 TABLET, FILM COATED ORAL at 09:23

## 2022-09-17 RX ADMIN — BUSPIRONE HYDROCHLORIDE 7.5 MG: 5 TABLET ORAL at 20:12

## 2022-09-17 RX ADMIN — POTASSIUM CHLORIDE 20 MEQ: 20 TABLET, EXTENDED RELEASE ORAL at 05:19

## 2022-09-17 RX ADMIN — IPRATROPIUM BROMIDE AND ALBUTEROL SULFATE 1 AMPULE: 2.5; .5 SOLUTION RESPIRATORY (INHALATION) at 12:25

## 2022-09-17 RX ADMIN — HYDROXYZINE HYDROCHLORIDE 25 MG: 25 TABLET, FILM COATED ORAL at 20:10

## 2022-09-17 RX ADMIN — POTASSIUM CHLORIDE 20 MEQ: 20 TABLET, EXTENDED RELEASE ORAL at 09:22

## 2022-09-17 RX ADMIN — BETHANECHOL CHLORIDE 25 MG: 25 TABLET ORAL at 18:35

## 2022-09-17 RX ADMIN — ENOXAPARIN SODIUM 50 MG: 100 INJECTION SUBCUTANEOUS at 20:13

## 2022-09-17 RX ADMIN — HEPARIN SODIUM AND DEXTROSE 14.6 UNITS/KG/HR: 10000; 5 INJECTION INTRAVENOUS at 00:38

## 2022-09-17 RX ADMIN — FUROSEMIDE 40 MG: 10 INJECTION, SOLUTION INTRAMUSCULAR; INTRAVENOUS at 18:29

## 2022-09-17 RX ADMIN — SODIUM CHLORIDE, PRESERVATIVE FREE 10 ML: 5 INJECTION INTRAVENOUS at 20:15

## 2022-09-17 RX ADMIN — METOPROLOL TARTRATE 5 MG: 1 INJECTION, SOLUTION INTRAVENOUS at 13:21

## 2022-09-17 RX ADMIN — METHYLPREDNISOLONE SODIUM SUCCINATE 80 MG: 125 INJECTION, POWDER, FOR SOLUTION INTRAMUSCULAR; INTRAVENOUS at 02:37

## 2022-09-17 RX ADMIN — SODIUM CHLORIDE, PRESERVATIVE FREE 10 ML: 5 INJECTION INTRAVENOUS at 02:37

## 2022-09-17 RX ADMIN — ALPRAZOLAM 0.25 MG: 0.25 TABLET ORAL at 15:39

## 2022-09-17 RX ADMIN — METOPROLOL TARTRATE 5 MG: 1 INJECTION, SOLUTION INTRAVENOUS at 18:34

## 2022-09-17 RX ADMIN — IPRATROPIUM BROMIDE AND ALBUTEROL SULFATE 1 AMPULE: 2.5; .5 SOLUTION RESPIRATORY (INHALATION) at 16:58

## 2022-09-17 RX ADMIN — FUROSEMIDE 40 MG: 10 INJECTION, SOLUTION INTRAMUSCULAR; INTRAVENOUS at 09:15

## 2022-09-17 RX ADMIN — PAROXETINE HYDROCHLORIDE 10 MG: 20 TABLET, FILM COATED ORAL at 20:10

## 2022-09-17 RX ADMIN — MIRTAZAPINE 7.5 MG: 15 TABLET, FILM COATED ORAL at 20:11

## 2022-09-17 RX ADMIN — IPRATROPIUM BROMIDE AND ALBUTEROL SULFATE 1 AMPULE: 2.5; .5 SOLUTION RESPIRATORY (INHALATION) at 08:48

## 2022-09-17 RX ADMIN — ISOSORBIDE MONONITRATE 30 MG: 30 TABLET, EXTENDED RELEASE ORAL at 09:22

## 2022-09-17 RX ADMIN — METHYLPREDNISOLONE SODIUM SUCCINATE 80 MG: 125 INJECTION, POWDER, FOR SOLUTION INTRAMUSCULAR; INTRAVENOUS at 15:39

## 2022-09-17 RX ADMIN — IPRATROPIUM BROMIDE AND ALBUTEROL SULFATE 1 AMPULE: 2.5; .5 SOLUTION RESPIRATORY (INHALATION) at 19:54

## 2022-09-17 RX ADMIN — METOPROLOL TARTRATE 5 MG: 1 INJECTION, SOLUTION INTRAVENOUS at 05:19

## 2022-09-17 RX ADMIN — BUSPIRONE HYDROCHLORIDE 10 MG: 5 TABLET ORAL at 09:22

## 2022-09-17 RX ADMIN — ASPIRIN 81 MG CHEWABLE TABLET 81 MG: 81 TABLET CHEWABLE at 09:22

## 2022-09-17 RX ADMIN — MOMETASONE FUROATE AND FORMOTEROL FUMARATE DIHYDRATE 2 PUFF: 100; 5 AEROSOL RESPIRATORY (INHALATION) at 19:54

## 2022-09-17 RX ADMIN — BETHANECHOL CHLORIDE 25 MG: 25 TABLET ORAL at 09:22

## 2022-09-17 RX ADMIN — BETHANECHOL CHLORIDE 25 MG: 25 TABLET ORAL at 13:21

## 2022-09-17 RX ADMIN — SODIUM CHLORIDE 1 G: 1 TABLET ORAL at 09:21

## 2022-09-17 RX ADMIN — METHYLPREDNISOLONE SODIUM SUCCINATE 80 MG: 125 INJECTION, POWDER, FOR SOLUTION INTRAMUSCULAR; INTRAVENOUS at 09:11

## 2022-09-17 RX ADMIN — SODIUM CHLORIDE 1 G: 1 TABLET ORAL at 13:25

## 2022-09-17 RX ADMIN — SODIUM CHLORIDE, PRESERVATIVE FREE 10 ML: 5 INJECTION INTRAVENOUS at 09:11

## 2022-09-17 RX ADMIN — MOMETASONE FUROATE AND FORMOTEROL FUMARATE DIHYDRATE 2 PUFF: 100; 5 AEROSOL RESPIRATORY (INHALATION) at 12:25

## 2022-09-17 RX ADMIN — METOPROLOL TARTRATE 5 MG: 1 INJECTION, SOLUTION INTRAVENOUS at 23:46

## 2022-09-17 RX ADMIN — SODIUM CHLORIDE, PRESERVATIVE FREE 10 ML: 5 INJECTION INTRAVENOUS at 23:47

## 2022-09-17 RX ADMIN — LISINOPRIL 5 MG: 5 TABLET ORAL at 09:22

## 2022-09-17 RX ADMIN — BETHANECHOL CHLORIDE 25 MG: 25 TABLET ORAL at 20:11

## 2022-09-17 RX ADMIN — METHYLPREDNISOLONE SODIUM SUCCINATE 80 MG: 125 INJECTION, POWDER, FOR SOLUTION INTRAMUSCULAR; INTRAVENOUS at 20:13

## 2022-09-17 NOTE — PROGRESS NOTES
Hospitalist Progress Note  9/17/2022 12:04 PM  Subjective:   Admit Date: 9/15/2022  PCP: Mario Hernandez MD    Interval History:Patient with acute on chronic CHF  echo with EF of 45% and grade II diastolic dysfunction. Diet: ADULT DIET; Regular  Medications:   Scheduled Meds:   furosemide  40 mg IntraVENous BID    sodium chloride flush  10 mL IntraVENous 2 times per day    ipratropium-albuterol  1 ampule Inhalation Q4H WA    aspirin  81 mg Oral Daily    bethanechol  25 mg Oral 4x Daily    mometasone-formoterol  2 puff Inhalation BID    busPIRone  10 mg Oral QAM    busPIRone  7.5 mg Oral Nightly    fluticasone  1 spray Nasal Daily    isosorbide mononitrate  30 mg Oral Daily    levothyroxine  50 mcg Oral Daily    lisinopril  5 mg Oral Daily    cetirizine  5 mg Oral Daily    mirtazapine  7.5 mg Oral Nightly    PARoxetine  10 mg Oral Nightly    potassium chloride  20 mEq Oral BID    metoprolol  5 mg IntraVENous Q6H    [Held by provider] hydroCHLOROthiazide  12.5 mg Oral Daily    sodium chloride  1 g Oral TID WC    methylPREDNISolone  80 mg IntraVENous Q6H     Continuous Infusions:   heparin (PORCINE) Infusion 14.6 Units/kg/hr (09/17/22 0527)    sodium chloride       CBC:   Recent Labs     09/15/22  0839 09/15/22  1812 09/16/22  0322 09/17/22  0306   WBC 8.7  --  6.9 9.5   HGB 13.5 12.9 12.6 11.9     --  301 279     BMP:    Recent Labs     09/15/22  0839 09/15/22  2100 09/16/22  0322 09/17/22  0306   * 125* 129* 133*   K 4.8  --  4.0 3.5*   CL 89*  --  90* 92*   CO2 26  --  27 34*   BUN 14  --  13 15   CREATININE 0.52  --  0.54 0.54   GLUCOSE 146*  --  159* 183*     Hepatic:   Recent Labs     09/15/22  0839   AST 44*   ALT 24   BILITOT 0.4   ALKPHOS 88     Troponin: No results for input(s): TROPONINI in the last 72 hours. BNP: No results for input(s): BNP in the last 72 hours. Lipids: No results for input(s): CHOL, HDL in the last 72 hours.     Invalid input(s): LDLCALCU  INR: No results for input(s): INR in the last 72 hours. Objective:   Vitals: BP (!) 164/76   Pulse 79   Temp 97.5 °F (36.4 °C) (Tympanic)   Resp 24   Ht 5' 4\" (1.626 m)   Wt 106 lb 6.4 oz (48.3 kg)   SpO2 93%   BMI 18.26 kg/m²   General appearance: alert and cooperative with exam  HEENT: Eye: Normal external eye, conjunctiva, lids cornea, PUMA. Neck: no adenopathy, no carotid bruit, no JVD, supple, symmetrical, trachea midline, and thyroid not enlarged, symmetric, no tenderness/mass/nodules  Lungs: diminished breath sounds bibasilar  Heart: regular rate and rhythm, S1, S2 normal, no murmur, click, rub or gallop  Abdomen: soft, non-tender; bowel sounds normal; no masses,  no organomegaly  Extremities: extremities normal, atraumatic, no cyanosis or edema  Neurologic: Mental status: Patient with dementia and is confused    Assessment and Plan: 1. Acute on chronic CHF  2. NSTEMI on medical management  3 . Dementia  4. Hyponatermia     Plan:Continue IV diuresis,discontinue heparin and switch to lovenox.   Patient Active Problem List:     Essential hypertension     Chronic kidney disease     Paranoia ([address]     Dyspnea     Chest pain     Anxiety     Mixed hyperlipidemia     Gait difficulty     Dementia without behavioral disturbance (HCC)     Vitamin D deficiency     Anemia     Dermatophytosis of nail     Mucopurulent chronic bronchitis (HCC)     Acute non intractable tension-type headache     Chest pain     Hypertensive urgency     Left bundle branch block     Hypertensive emergency     Ischemic chest pain ([address]     Acute on chronic systolic CHF (congestive heart failure) ([address]     Acute on chronic congestive heart failure ([address]     Thoracic aortic aneurysm ([address]      Aline Hubbard MD, MD  Rounding Hospitalist

## 2022-09-17 NOTE — PROGRESS NOTES
Physical Therapy    Attempted to see patient twice this date. Patient notes being in too much pain and fatigue to participate at this time. Will attempt to see patient tomorrow 9-18-22.     Vale Wilson PTA

## 2022-09-18 ENCOUNTER — APPOINTMENT (OUTPATIENT)
Dept: GENERAL RADIOLOGY | Age: 87
DRG: 280 | End: 2022-09-18
Payer: MEDICARE

## 2022-09-18 LAB
ABSOLUTE EOS #: <0.03 K/UL (ref 0–0.44)
ABSOLUTE IMMATURE GRANULOCYTE: 0.07 K/UL (ref 0–0.3)
ABSOLUTE LYMPH #: 0.83 K/UL (ref 1.1–3.7)
ABSOLUTE MONO #: 0.44 K/UL (ref 0.1–1.2)
ANION GAP SERPL CALCULATED.3IONS-SCNC: 7 MMOL/L (ref 9–17)
BACTERIA: ABNORMAL
BASOPHILS # BLD: 0 % (ref 0–2)
BASOPHILS ABSOLUTE: <0.03 K/UL (ref 0–0.2)
BILIRUBIN URINE: NEGATIVE
BUN BLDV-MCNC: 22 MG/DL (ref 8–23)
BUN/CREAT BLD: 36 (ref 9–20)
CALCIUM SERPL-MCNC: 9.4 MG/DL (ref 8.6–10.4)
CHLORIDE BLD-SCNC: 97 MMOL/L (ref 98–107)
CO2: 34 MMOL/L (ref 20–31)
CREAT SERPL-MCNC: 0.61 MG/DL (ref 0.5–0.9)
EKG ATRIAL RATE: 112 BPM
EKG ATRIAL RATE: 60 BPM
EKG ATRIAL RATE: 74 BPM
EKG ATRIAL RATE: 93 BPM
EKG P AXIS: 44 DEGREES
EKG P AXIS: 66 DEGREES
EKG P AXIS: 67 DEGREES
EKG P AXIS: 88 DEGREES
EKG P-R INTERVAL: 148 MS
EKG P-R INTERVAL: 158 MS
EKG P-R INTERVAL: 168 MS
EKG P-R INTERVAL: 176 MS
EKG Q-T INTERVAL: 350 MS
EKG Q-T INTERVAL: 402 MS
EKG Q-T INTERVAL: 504 MS
EKG Q-T INTERVAL: 586 MS
EKG QRS DURATION: 114 MS
EKG QRS DURATION: 118 MS
EKG QRS DURATION: 120 MS
EKG QRS DURATION: 124 MS
EKG QTC CALCULATION (BAZETT): 477 MS
EKG QTC CALCULATION (BAZETT): 499 MS
EKG QTC CALCULATION (BAZETT): 559 MS
EKG QTC CALCULATION (BAZETT): 586 MS
EKG R AXIS: -53 DEGREES
EKG R AXIS: -62 DEGREES
EKG R AXIS: -62 DEGREES
EKG R AXIS: -63 DEGREES
EKG T AXIS: 159 DEGREES
EKG T AXIS: 163 DEGREES
EKG T AXIS: 92 DEGREES
EKG T AXIS: 93 DEGREES
EKG VENTRICULAR RATE: 112 BPM
EKG VENTRICULAR RATE: 60 BPM
EKG VENTRICULAR RATE: 74 BPM
EKG VENTRICULAR RATE: 93 BPM
EOSINOPHILS RELATIVE PERCENT: 0 % (ref 1–4)
EPITHELIAL CELLS UA: ABNORMAL /HPF (ref 0–5)
GFR AFRICAN AMERICAN: >60 ML/MIN
GFR NON-AFRICAN AMERICAN: >60 ML/MIN
GFR SERPL CREATININE-BSD FRML MDRD: ABNORMAL ML/MIN/{1.73_M2}
GLUCOSE BLD-MCNC: 162 MG/DL (ref 70–99)
GLUCOSE URINE: NEGATIVE
HCT VFR BLD CALC: 39.3 % (ref 36.3–47.1)
HEMOGLOBIN: 12.4 G/DL (ref 11.9–15.1)
IMMATURE GRANULOCYTES: 1 %
KETONES, URINE: NEGATIVE
LEUKOCYTE ESTERASE, URINE: NEGATIVE
LYMPHOCYTES # BLD: 7 % (ref 24–43)
MCH RBC QN AUTO: 27.8 PG (ref 25.2–33.5)
MCHC RBC AUTO-ENTMCNC: 31.6 G/DL (ref 25.2–33.5)
MCV RBC AUTO: 88.1 FL (ref 82.6–102.9)
MONOCYTES # BLD: 4 % (ref 3–12)
MUCUS: ABNORMAL
NITRITE, URINE: NEGATIVE
NRBC AUTOMATED: 0 PER 100 WBC
PDW BLD-RTO: 14.6 % (ref 11.8–14.4)
PH UA: 6.5 (ref 5–6)
PLATELET # BLD: 355 K/UL (ref 138–453)
PMV BLD AUTO: 9.3 FL (ref 8.1–13.5)
POTASSIUM SERPL-SCNC: 4.4 MMOL/L (ref 3.7–5.3)
PROTEIN UA: NEGATIVE
RBC # BLD: 4.46 M/UL (ref 3.95–5.11)
RBC UA: ABNORMAL /HPF (ref 0–4)
SEG NEUTROPHILS: 89 % (ref 36–65)
SEGMENTED NEUTROPHILS ABSOLUTE COUNT: 11.06 K/UL (ref 1.5–8.1)
SODIUM BLD-SCNC: 138 MMOL/L (ref 135–144)
SPECIFIC GRAVITY UA: 1.01 (ref 1.01–1.02)
URINE HGB: NEGATIVE
UROBILINOGEN, URINE: NORMAL
WBC # BLD: 12.4 K/UL (ref 3.5–11.3)
WBC UA: ABNORMAL /HPF (ref 0–4)

## 2022-09-18 PROCEDURE — 36415 COLL VENOUS BLD VENIPUNCTURE: CPT

## 2022-09-18 PROCEDURE — 6370000000 HC RX 637 (ALT 250 FOR IP): Performed by: INTERNAL MEDICINE

## 2022-09-18 PROCEDURE — 2500000003 HC RX 250 WO HCPCS: Performed by: NURSE PRACTITIONER

## 2022-09-18 PROCEDURE — 6360000002 HC RX W HCPCS: Performed by: EMERGENCY MEDICINE

## 2022-09-18 PROCEDURE — 6370000000 HC RX 637 (ALT 250 FOR IP): Performed by: NURSE PRACTITIONER

## 2022-09-18 PROCEDURE — 99232 SBSQ HOSP IP/OBS MODERATE 35: CPT | Performed by: FAMILY MEDICINE

## 2022-09-18 PROCEDURE — 6360000002 HC RX W HCPCS: Performed by: NURSE PRACTITIONER

## 2022-09-18 PROCEDURE — 80048 BASIC METABOLIC PNL TOTAL CA: CPT

## 2022-09-18 PROCEDURE — 94640 AIRWAY INHALATION TREATMENT: CPT

## 2022-09-18 PROCEDURE — 2700000000 HC OXYGEN THERAPY PER DAY

## 2022-09-18 PROCEDURE — 6360000002 HC RX W HCPCS: Performed by: FAMILY MEDICINE

## 2022-09-18 PROCEDURE — 94760 N-INVAS EAR/PLS OXIMETRY 1: CPT

## 2022-09-18 PROCEDURE — 97116 GAIT TRAINING THERAPY: CPT | Performed by: PHYSICAL THERAPY ASSISTANT

## 2022-09-18 PROCEDURE — 2060000000 HC ICU INTERMEDIATE R&B

## 2022-09-18 PROCEDURE — 2580000003 HC RX 258: Performed by: INTERNAL MEDICINE

## 2022-09-18 PROCEDURE — 97110 THERAPEUTIC EXERCISES: CPT | Performed by: PHYSICAL THERAPY ASSISTANT

## 2022-09-18 PROCEDURE — 85025 COMPLETE CBC W/AUTO DIFF WBC: CPT

## 2022-09-18 PROCEDURE — 71045 X-RAY EXAM CHEST 1 VIEW: CPT

## 2022-09-18 PROCEDURE — 81001 URINALYSIS AUTO W/SCOPE: CPT

## 2022-09-18 PROCEDURE — 6360000002 HC RX W HCPCS: Performed by: INTERNAL MEDICINE

## 2022-09-18 RX ORDER — ENALAPRILAT 2.5 MG/2ML
1.25 INJECTION INTRAVENOUS EVERY 6 HOURS PRN
Status: DISCONTINUED | OUTPATIENT
Start: 2022-09-18 | End: 2022-09-20 | Stop reason: HOSPADM

## 2022-09-18 RX ORDER — ENOXAPARIN SODIUM 100 MG/ML
40 INJECTION SUBCUTANEOUS DAILY
Status: DISCONTINUED | OUTPATIENT
Start: 2022-09-19 | End: 2022-09-20 | Stop reason: HOSPADM

## 2022-09-18 RX ADMIN — BETHANECHOL CHLORIDE 25 MG: 25 TABLET ORAL at 09:18

## 2022-09-18 RX ADMIN — FUROSEMIDE 40 MG: 10 INJECTION, SOLUTION INTRAMUSCULAR; INTRAVENOUS at 09:18

## 2022-09-18 RX ADMIN — METHYLPREDNISOLONE SODIUM SUCCINATE 80 MG: 125 INJECTION, POWDER, FOR SOLUTION INTRAMUSCULAR; INTRAVENOUS at 20:21

## 2022-09-18 RX ADMIN — METOPROLOL TARTRATE 5 MG: 1 INJECTION, SOLUTION INTRAVENOUS at 18:14

## 2022-09-18 RX ADMIN — SODIUM CHLORIDE, PRESERVATIVE FREE 10 ML: 5 INJECTION INTRAVENOUS at 09:19

## 2022-09-18 RX ADMIN — FUROSEMIDE 40 MG: 10 INJECTION, SOLUTION INTRAMUSCULAR; INTRAVENOUS at 18:14

## 2022-09-18 RX ADMIN — SODIUM CHLORIDE 1 G: 1 TABLET ORAL at 13:56

## 2022-09-18 RX ADMIN — MOMETASONE FUROATE AND FORMOTEROL FUMARATE DIHYDRATE 2 PUFF: 100; 5 AEROSOL RESPIRATORY (INHALATION) at 09:03

## 2022-09-18 RX ADMIN — MIRTAZAPINE 7.5 MG: 15 TABLET, FILM COATED ORAL at 20:21

## 2022-09-18 RX ADMIN — METOPROLOL TARTRATE 5 MG: 1 INJECTION, SOLUTION INTRAVENOUS at 13:12

## 2022-09-18 RX ADMIN — METHYLPREDNISOLONE SODIUM SUCCINATE 80 MG: 125 INJECTION, POWDER, FOR SOLUTION INTRAMUSCULAR; INTRAVENOUS at 03:59

## 2022-09-18 RX ADMIN — IPRATROPIUM BROMIDE AND ALBUTEROL SULFATE 1 AMPULE: 2.5; .5 SOLUTION RESPIRATORY (INHALATION) at 13:10

## 2022-09-18 RX ADMIN — ENOXAPARIN SODIUM 50 MG: 100 INJECTION SUBCUTANEOUS at 09:18

## 2022-09-18 RX ADMIN — PAROXETINE HYDROCHLORIDE 10 MG: 20 TABLET, FILM COATED ORAL at 20:21

## 2022-09-18 RX ADMIN — IPRATROPIUM BROMIDE AND ALBUTEROL SULFATE 1 AMPULE: 2.5; .5 SOLUTION RESPIRATORY (INHALATION) at 16:26

## 2022-09-18 RX ADMIN — IPRATROPIUM BROMIDE AND ALBUTEROL SULFATE 1 AMPULE: 2.5; .5 SOLUTION RESPIRATORY (INHALATION) at 09:02

## 2022-09-18 RX ADMIN — LEVOTHYROXINE SODIUM 50 MCG: 0.03 TABLET ORAL at 05:25

## 2022-09-18 RX ADMIN — ISOSORBIDE MONONITRATE 30 MG: 30 TABLET, EXTENDED RELEASE ORAL at 09:17

## 2022-09-18 RX ADMIN — CETIRIZINE HYDROCHLORIDE 5 MG: 5 TABLET, FILM COATED ORAL at 09:18

## 2022-09-18 RX ADMIN — BUSPIRONE HYDROCHLORIDE 10 MG: 5 TABLET ORAL at 09:18

## 2022-09-18 RX ADMIN — LISINOPRIL 5 MG: 5 TABLET ORAL at 09:18

## 2022-09-18 RX ADMIN — ENALAPRILAT 1.25 MG: 1.25 INJECTION INTRAVENOUS at 03:59

## 2022-09-18 RX ADMIN — SODIUM CHLORIDE, PRESERVATIVE FREE 10 ML: 5 INJECTION INTRAVENOUS at 03:59

## 2022-09-18 RX ADMIN — BUSPIRONE HYDROCHLORIDE 7.5 MG: 5 TABLET ORAL at 20:21

## 2022-09-18 RX ADMIN — ALPRAZOLAM 0.25 MG: 0.25 TABLET ORAL at 15:55

## 2022-09-18 RX ADMIN — MOMETASONE FUROATE AND FORMOTEROL FUMARATE DIHYDRATE 2 PUFF: 100; 5 AEROSOL RESPIRATORY (INHALATION) at 19:35

## 2022-09-18 RX ADMIN — METHYLPREDNISOLONE SODIUM SUCCINATE 80 MG: 125 INJECTION, POWDER, FOR SOLUTION INTRAMUSCULAR; INTRAVENOUS at 16:09

## 2022-09-18 RX ADMIN — METOPROLOL TARTRATE 5 MG: 1 INJECTION, SOLUTION INTRAVENOUS at 05:28

## 2022-09-18 RX ADMIN — BETHANECHOL CHLORIDE 25 MG: 25 TABLET ORAL at 17:15

## 2022-09-18 RX ADMIN — POTASSIUM CHLORIDE 20 MEQ: 20 TABLET, EXTENDED RELEASE ORAL at 09:18

## 2022-09-18 RX ADMIN — Medication 5 ML: at 15:55

## 2022-09-18 RX ADMIN — ASPIRIN 81 MG CHEWABLE TABLET 81 MG: 81 TABLET CHEWABLE at 09:18

## 2022-09-18 RX ADMIN — BETHANECHOL CHLORIDE 25 MG: 25 TABLET ORAL at 13:13

## 2022-09-18 RX ADMIN — POTASSIUM CHLORIDE 20 MEQ: 20 TABLET, EXTENDED RELEASE ORAL at 20:21

## 2022-09-18 RX ADMIN — IPRATROPIUM BROMIDE AND ALBUTEROL SULFATE 1 AMPULE: 2.5; .5 SOLUTION RESPIRATORY (INHALATION) at 19:34

## 2022-09-18 RX ADMIN — SODIUM CHLORIDE, PRESERVATIVE FREE 10 ML: 5 INJECTION INTRAVENOUS at 20:21

## 2022-09-18 RX ADMIN — ALBUTEROL SULFATE 2.5 MG: 2.5 SOLUTION RESPIRATORY (INHALATION) at 05:37

## 2022-09-18 RX ADMIN — NITROGLYCERIN 0.4 MG: 0.4 TABLET, ORALLY DISINTEGRATING SUBLINGUAL at 05:31

## 2022-09-18 RX ADMIN — SODIUM CHLORIDE 1 G: 1 TABLET ORAL at 09:21

## 2022-09-18 RX ADMIN — SODIUM CHLORIDE 1 G: 1 TABLET ORAL at 17:15

## 2022-09-18 RX ADMIN — METHYLPREDNISOLONE SODIUM SUCCINATE 80 MG: 125 INJECTION, POWDER, FOR SOLUTION INTRAMUSCULAR; INTRAVENOUS at 09:18

## 2022-09-18 RX ADMIN — FLUTICASONE PROPIONATE 1 SPRAY: 50 SPRAY, METERED NASAL at 09:22

## 2022-09-18 RX ADMIN — BETHANECHOL CHLORIDE 25 MG: 25 TABLET ORAL at 20:21

## 2022-09-18 ASSESSMENT — PAIN SCALES - GENERAL
PAINLEVEL_OUTOF10: 0
PAINLEVEL_OUTOF10: 7

## 2022-09-18 ASSESSMENT — PAIN DESCRIPTION - ORIENTATION: ORIENTATION: LEFT

## 2022-09-18 ASSESSMENT — PAIN DESCRIPTION - DESCRIPTORS: DESCRIPTORS: DULL

## 2022-09-18 ASSESSMENT — PAIN DESCRIPTION - PAIN TYPE: TYPE: ACUTE PAIN

## 2022-09-18 ASSESSMENT — PAIN DESCRIPTION - LOCATION: LOCATION: CHEST;HEAD

## 2022-09-18 NOTE — PROGRESS NOTES
Physical Therapy  Facility/Department: [facility]  PROGRESSIVE CARE  Daily Treatment Note  NAME: Laly Suggs  : 1930  MRN: [id number]    Date of Service: 2022    Discharge Recommendations:  [address] with PT, Continue to assess pending progress        Patient Diagnosis(es): The encounter diagnosis was Acute on chronic congestive heart failure, unspecified heart failure type ([address] Assessment   Activity Tolerance: Patient tolerated treatment well;Patient limited by endurance     Plan    Plan  Plan: 1 time a day 7 days a week  Current Treatment Recommendations: Strengthening;Balance training;ROM; Functional mobility training;Transfer training;Gait training  PT Plan of Care: Daily     Restrictions  Restrictions/Precautions  Restrictions/Precautions: General Precautions     Subjective    Subjective  Subjective: Pt in bed at initiation of session. Agreeable to therapy at this time. Pain: Note chronic nagging left check pain. Nursing informed. Orientation  Overall Orientation Status: Impaired  Orientation Level: Oriented to person;Disoriented to situation;Disoriented to place; Disoriented to time  Cognition  Following Commands: Follows one step commands with increased time; Follows one step commands with repetition  Safety Judgement: Decreased awareness of need for assistance  Problem Solving: Assistance required to generate solutions     Objective   Vitals  SpO2: 95 % (at rest. Decreased to 90% with activity.)  O2 Device: Nasal cannula (2L)  Bed Mobility Training  Bed Mobility Training: Yes  Supine to Sit: Stand-by assistance  Scooting: Stand-by assistance  Transfer Training  Transfer Training: Yes  Overall Level of Assistance: Contact-guard assistance;Minimum assistance  Sit to Stand: Minimum assistance;Contact-guard assistance  Stand to Sit: Contact-guard assistance;Minimum assistance  Gait Training  Gait Training: Yes  Right Side Weight Bearing: Full  Left Side Weight Bearing: Full  Gait  Overall Level of Assistance: Contact-guard assistance;Minimum assistance  Speed/Tasha: Shuffled; Slow  Step Length: Left shortened;Right shortened  Distance (ft): 13 Feet (x1 to toilet and 20'x1 to bedside chair)  Assistive Device:  (Several latera deviations noted. Also require assistance with walker navigation.)  Neuromuscular Education  NDT Treatment: Gait ;Sitting;Standing  PT Exercises  Exercise Treatment: Sitting: ankle pumps, marches x20 ea  Resistive Exercises: Sitting: hip abd, add, LAQ, HS curls x10 ea with manual resistance. Safety Devices  Type of Devices: Telesitter in use;Call light within reach; Chair alarm in place;Gait belt;Left in chair       Goals  Short Term Goals  Time Frame for Short term goals: LOS  Short term goal 1: bed mobiltiy with CGA  Short term goal 2: Transfers with CGA  Short term goal 3: Amb x 10ft with RW and CGA  Patient Goals   Patient goals : Return to facility    Education       Therapy Time   Individual Concurrent Group Co-treatment   Time In 0818         Time Out 8619         Minutes [address], [place]

## 2022-09-18 NOTE — PROGRESS NOTES
Hospitalist Progress Note  9/18/2022 10:57 AM  Subjective:   Admit Date: 9/15/2022  PCP: Donnie Beth MD    Interval History:Patient with c/o shortness of breath wit cough no chest pain h/o acute on chronic CHF  echo with EF of 45% and grade II diastolic dysfunction. WBC count is up today. Diet: ADULT DIET; Regular  Medications:   Scheduled Meds:   enoxaparin  50 mg SubCUTAneous BID    furosemide  40 mg IntraVENous BID    sodium chloride flush  10 mL IntraVENous 2 times per day    ipratropium-albuterol  1 ampule Inhalation Q4H WA    aspirin  81 mg Oral Daily    bethanechol  25 mg Oral 4x Daily    mometasone-formoterol  2 puff Inhalation BID    busPIRone  10 mg Oral QAM    busPIRone  7.5 mg Oral Nightly    fluticasone  1 spray Nasal Daily    isosorbide mononitrate  30 mg Oral Daily    levothyroxine  50 mcg Oral Daily    lisinopril  5 mg Oral Daily    cetirizine  5 mg Oral Daily    mirtazapine  7.5 mg Oral Nightly    PARoxetine  10 mg Oral Nightly    potassium chloride  20 mEq Oral BID    metoprolol  5 mg IntraVENous Q6H    sodium chloride  1 g Oral TID WC    methylPREDNISolone  80 mg IntraVENous Q6H     Continuous Infusions:   sodium chloride       CBC:   Recent Labs     09/16/22  0322 09/17/22  0306 09/18/22  0541   WBC 6.9 9.5 12.4*   HGB 12.6 11.9 12.4    279 355     BMP:    Recent Labs     09/16/22  0322 09/17/22  0306 09/18/22  0541   * 133* 138   K 4.0 3.5* 4.4   CL 90* 92* 97*   CO2 27 34* 34*   BUN 13 15 22   CREATININE 0.54 0.54 0.61   GLUCOSE 159* 183* 162*     Hepatic:   No results for input(s): AST, ALT, ALB, BILITOT, ALKPHOS in the last 72 hours. Troponin: No results for input(s): TROPONINI in the last 72 hours. BNP: No results for input(s): BNP in the last 72 hours. Lipids: No results for input(s): CHOL, HDL in the last 72 hours. Invalid input(s): LDLCALCU  INR: No results for input(s): INR in the last 72 hours.     Objective:   Vitals: BP (!) 156/84   Pulse 88   Temp 97.6 °F (36.4 °C) (Tympanic)   Resp 18   Ht 5' 4\" (1.626 m)   Wt 105 lb 8 oz (47.9 kg)   SpO2 (!) 89% Comment: pt placed back on o2 @ 2lpm nc  BMI 18.11 kg/m²   General appearance: alert and cooperative with exam  HEENT: Eye: Normal external eye, conjunctiva, lids cornea, PUMA. Neck: no adenopathy, no carotid bruit, no JVD, supple, symmetrical, trachea midline, and thyroid not enlarged, symmetric, no tenderness/mass/nodules  Lungs: diminished breath sounds bibasilar  Heart: regular rate and rhythm, S1, S2 normal, no murmur, click, rub or gallop  Abdomen: soft, non-tender; bowel sounds normal; no masses,  no organomegaly  Extremities: extremities normal, atraumatic, no cyanosis or edema  Neurologic: Mental status: Patient with dementia and is confused    Assessment and Plan: 1. Acute on chronic CHF  2. NSTEMI on medical management  3 . Dementia  4. Hyponatermia     Plan:Continue IV diuresis,CXR today consider antibiotics if any changes suggesting pneumonia.will switch to Low dose Lovenox today  Patient Active Problem List:     Essential hypertension     Chronic kidney disease     Paranoia (HCC)     Dyspnea     Chest pain     Anxiety     Mixed hyperlipidemia     Gait difficulty     Dementia without behavioral disturbance (HCC)     Vitamin D deficiency     Anemia     Dermatophytosis of nail     Mucopurulent chronic bronchitis (HCC)     Acute non intractable tension-type headache     Chest pain     Hypertensive urgency     Left bundle branch block     Hypertensive emergency     Ischemic chest pain (HCC)     Acute on chronic systolic CHF (congestive heart failure) (HCC)     Acute on chronic congestive heart failure ([address]     Thoracic aortic aneurysm ([address]      Wyatt Keller MD, MD  Rounding Hospitalist

## 2022-09-18 NOTE — PLAN OF CARE
Problem: Discharge Planning  Goal: Discharge to home or other facility with appropriate resources  Outcome: Progressing     Problem: Safety - Adult  Goal: Free from fall injury  Outcome: Progressing     Problem: ABCDS Injury Assessment  Goal: Absence of physical injury  Outcome: Progressing     Problem: Confusion  Goal: Confusion, delirium, dementia, or psychosis is improved or at baseline  Description: INTERVENTIONS:  1. Assess for possible contributors to thought disturbance, including medications, impaired vision or hearing, underlying metabolic abnormalities, dehydration, psychiatric diagnoses, and notify attending LIP  2. [place] high risk fall precautions, as indicated  3. Provide frequent short contacts to provide reality reorientation, refocusing and direction  4. Decrease environmental stimuli, including noise as appropriate  5. Monitor and intervene to maintain adequate nutrition, hydration, elimination, sleep and activity  6. If unable to ensure safety without constant attention obtain sitter and review sitter guidelines with assigned personnel  7.  Initiate Psychosocial CNS and Spiritual Care consult, as indicated  Outcome: Progressing     Problem: Chronic Conditions and Co-morbidities  Goal: Patient's chronic conditions and co-morbidity symptoms are monitored and maintained or improved  Outcome: Progressing     Problem: Pain  Goal: Verbalizes/displays adequate comfort level or baseline comfort level  Outcome: Progressing

## 2022-09-19 ENCOUNTER — APPOINTMENT (OUTPATIENT)
Dept: GENERAL RADIOLOGY | Age: 87
DRG: 280 | End: 2022-09-19
Payer: MEDICARE

## 2022-09-19 LAB
ABSOLUTE EOS #: 0 K/UL (ref 0–0.4)
ABSOLUTE IMMATURE GRANULOCYTE: 0 K/UL (ref 0–0.3)
ABSOLUTE LYMPH #: 0.69 K/UL (ref 1–4.8)
ABSOLUTE MONO #: 0.09 K/UL (ref 0.1–1.2)
ANION GAP SERPL CALCULATED.3IONS-SCNC: 7 MMOL/L (ref 9–17)
BASOPHILS # BLD: 0 % (ref 0–1)
BASOPHILS ABSOLUTE: 0 K/UL (ref 0–0.2)
BUN BLDV-MCNC: 25 MG/DL (ref 8–23)
BUN/CREAT BLD: 48 (ref 9–20)
CALCIUM SERPL-MCNC: 9 MG/DL (ref 8.6–10.4)
CHLORIDE BLD-SCNC: 95 MMOL/L (ref 98–107)
CO2: 36 MMOL/L (ref 20–31)
CREAT SERPL-MCNC: 0.52 MG/DL (ref 0.5–0.9)
EOSINOPHILS RELATIVE PERCENT: 0 % (ref 1–7)
GFR AFRICAN AMERICAN: >60 ML/MIN
GFR NON-AFRICAN AMERICAN: >60 ML/MIN
GFR SERPL CREATININE-BSD FRML MDRD: ABNORMAL ML/MIN/{1.73_M2}
GLUCOSE BLD-MCNC: 150 MG/DL (ref 65–105)
GLUCOSE BLD-MCNC: 153 MG/DL (ref 65–105)
GLUCOSE BLD-MCNC: 161 MG/DL (ref 70–99)
GLUCOSE BLD-MCNC: 170 MG/DL (ref 65–105)
GLUCOSE BLD-MCNC: 203 MG/DL (ref 65–105)
HCT VFR BLD CALC: 36.6 % (ref 36.3–47.1)
HEMOGLOBIN: 11.9 G/DL (ref 11.9–15.1)
IMMATURE GRANULOCYTES: 0 %
LYMPHOCYTES # BLD: 8 % (ref 16–46)
MCH RBC QN AUTO: 28.6 PG (ref 25.2–33.5)
MCHC RBC AUTO-ENTMCNC: 32.5 G/DL (ref 25.2–33.5)
MCV RBC AUTO: 88 FL (ref 82.6–102.9)
MONOCYTES # BLD: 1 % (ref 4–11)
MORPHOLOGY: ABNORMAL
MORPHOLOGY: ABNORMAL
NRBC AUTOMATED: 0 PER 100 WBC
PDW BLD-RTO: 14.8 % (ref 11.8–14.4)
PLATELET # BLD: 288 K/UL (ref 138–453)
PMV BLD AUTO: 9.2 FL (ref 8.1–13.5)
POTASSIUM SERPL-SCNC: 4.1 MMOL/L (ref 3.7–5.3)
RBC # BLD: 4.16 M/UL (ref 3.95–5.11)
SEG NEUTROPHILS: 91 % (ref 43–77)
SEGMENTED NEUTROPHILS ABSOLUTE COUNT: 7.82 K/UL (ref 1.5–8.1)
SODIUM BLD-SCNC: 138 MMOL/L (ref 135–144)
WBC # BLD: 8.6 K/UL (ref 3.5–11.3)

## 2022-09-19 PROCEDURE — 6370000000 HC RX 637 (ALT 250 FOR IP): Performed by: INTERNAL MEDICINE

## 2022-09-19 PROCEDURE — 85025 COMPLETE CBC W/AUTO DIFF WBC: CPT

## 2022-09-19 PROCEDURE — 2500000003 HC RX 250 WO HCPCS: Performed by: NURSE PRACTITIONER

## 2022-09-19 PROCEDURE — 6360000002 HC RX W HCPCS: Performed by: NURSE PRACTITIONER

## 2022-09-19 PROCEDURE — 6360000002 HC RX W HCPCS: Performed by: FAMILY MEDICINE

## 2022-09-19 PROCEDURE — 92611 MOTION FLUOROSCOPY/SWALLOW: CPT

## 2022-09-19 PROCEDURE — 2580000003 HC RX 258: Performed by: INTERNAL MEDICINE

## 2022-09-19 PROCEDURE — 74230 X-RAY XM SWLNG FUNCJ C+: CPT

## 2022-09-19 PROCEDURE — 80048 BASIC METABOLIC PNL TOTAL CA: CPT

## 2022-09-19 PROCEDURE — 2700000000 HC OXYGEN THERAPY PER DAY

## 2022-09-19 PROCEDURE — 99231 SBSQ HOSP IP/OBS SF/LOW 25: CPT | Performed by: INTERNAL MEDICINE

## 2022-09-19 PROCEDURE — 71045 X-RAY EXAM CHEST 1 VIEW: CPT

## 2022-09-19 PROCEDURE — 36415 COLL VENOUS BLD VENIPUNCTURE: CPT

## 2022-09-19 PROCEDURE — 94761 N-INVAS EAR/PLS OXIMETRY MLT: CPT

## 2022-09-19 PROCEDURE — 2060000000 HC ICU INTERMEDIATE R&B

## 2022-09-19 PROCEDURE — 94640 AIRWAY INHALATION TREATMENT: CPT

## 2022-09-19 PROCEDURE — 6360000002 HC RX W HCPCS: Performed by: INTERNAL MEDICINE

## 2022-09-19 PROCEDURE — 82947 ASSAY GLUCOSE BLOOD QUANT: CPT

## 2022-09-19 PROCEDURE — 2500000003 HC RX 250 WO HCPCS: Performed by: INTERNAL MEDICINE

## 2022-09-19 PROCEDURE — 6370000000 HC RX 637 (ALT 250 FOR IP): Performed by: NURSE PRACTITIONER

## 2022-09-19 RX ORDER — DEXTROSE MONOHYDRATE 100 MG/ML
INJECTION, SOLUTION INTRAVENOUS CONTINUOUS PRN
Status: DISCONTINUED | OUTPATIENT
Start: 2022-09-19 | End: 2022-09-20 | Stop reason: HOSPADM

## 2022-09-19 RX ORDER — INSULIN GLARGINE 100 [IU]/ML
5 INJECTION, SOLUTION SUBCUTANEOUS DAILY
Status: DISCONTINUED | OUTPATIENT
Start: 2022-09-19 | End: 2022-09-20 | Stop reason: HOSPADM

## 2022-09-19 RX ORDER — SODIUM CHLORIDE 1000 MG
1 TABLET, SOLUBLE MISCELLANEOUS 2 TIMES DAILY WITH MEALS
Status: DISCONTINUED | OUTPATIENT
Start: 2022-09-19 | End: 2022-09-20 | Stop reason: HOSPADM

## 2022-09-19 RX ORDER — ISOSORBIDE MONONITRATE 60 MG/1
60 TABLET, EXTENDED RELEASE ORAL DAILY
Status: DISCONTINUED | OUTPATIENT
Start: 2022-09-19 | End: 2022-09-20 | Stop reason: HOSPADM

## 2022-09-19 RX ORDER — INSULIN LISPRO 100 [IU]/ML
0-4 INJECTION, SOLUTION INTRAVENOUS; SUBCUTANEOUS
Status: DISCONTINUED | OUTPATIENT
Start: 2022-09-19 | End: 2022-09-20 | Stop reason: HOSPADM

## 2022-09-19 RX ORDER — METOPROLOL TARTRATE 50 MG/1
50 TABLET, FILM COATED ORAL 2 TIMES DAILY
Status: DISCONTINUED | OUTPATIENT
Start: 2022-09-19 | End: 2022-09-20 | Stop reason: HOSPADM

## 2022-09-19 RX ORDER — INSULIN LISPRO 100 [IU]/ML
0-4 INJECTION, SOLUTION INTRAVENOUS; SUBCUTANEOUS NIGHTLY
Status: DISCONTINUED | OUTPATIENT
Start: 2022-09-19 | End: 2022-09-20 | Stop reason: HOSPADM

## 2022-09-19 RX ORDER — METHYLPREDNISOLONE SODIUM SUCCINATE 125 MG/2ML
60 INJECTION, POWDER, LYOPHILIZED, FOR SOLUTION INTRAMUSCULAR; INTRAVENOUS EVERY 8 HOURS
Status: DISCONTINUED | OUTPATIENT
Start: 2022-09-19 | End: 2022-09-20 | Stop reason: HOSPADM

## 2022-09-19 RX ADMIN — BARIUM SULFATE 140 ML: 980 POWDER, FOR SUSPENSION ORAL at 13:05

## 2022-09-19 RX ADMIN — BETHANECHOL CHLORIDE 25 MG: 25 TABLET ORAL at 18:22

## 2022-09-19 RX ADMIN — ENALAPRILAT 1.25 MG: 1.25 INJECTION INTRAVENOUS at 02:27

## 2022-09-19 RX ADMIN — FUROSEMIDE 40 MG: 10 INJECTION, SOLUTION INTRAMUSCULAR; INTRAVENOUS at 09:43

## 2022-09-19 RX ADMIN — SODIUM CHLORIDE, PRESERVATIVE FREE 10 ML: 5 INJECTION INTRAVENOUS at 22:42

## 2022-09-19 RX ADMIN — METOPROLOL TARTRATE 5 MG: 1 INJECTION, SOLUTION INTRAVENOUS at 00:33

## 2022-09-19 RX ADMIN — POTASSIUM CHLORIDE 20 MEQ: 20 TABLET, EXTENDED RELEASE ORAL at 09:47

## 2022-09-19 RX ADMIN — BETHANECHOL CHLORIDE 25 MG: 25 TABLET ORAL at 12:41

## 2022-09-19 RX ADMIN — LEVOTHYROXINE SODIUM 50 MCG: 0.03 TABLET ORAL at 05:43

## 2022-09-19 RX ADMIN — METHYLPREDNISOLONE SODIUM SUCCINATE 80 MG: 125 INJECTION, POWDER, FOR SOLUTION INTRAMUSCULAR; INTRAVENOUS at 02:23

## 2022-09-19 RX ADMIN — SODIUM CHLORIDE, PRESERVATIVE FREE 10 ML: 5 INJECTION INTRAVENOUS at 02:24

## 2022-09-19 RX ADMIN — MOMETASONE FUROATE AND FORMOTEROL FUMARATE DIHYDRATE 2 PUFF: 100; 5 AEROSOL RESPIRATORY (INHALATION) at 19:51

## 2022-09-19 RX ADMIN — INSULIN LISPRO 1 UNITS: 100 INJECTION, SOLUTION INTRAVENOUS; SUBCUTANEOUS at 12:38

## 2022-09-19 RX ADMIN — PAROXETINE HYDROCHLORIDE 10 MG: 20 TABLET, FILM COATED ORAL at 22:53

## 2022-09-19 RX ADMIN — SODIUM CHLORIDE, PRESERVATIVE FREE 10 ML: 5 INJECTION INTRAVENOUS at 00:33

## 2022-09-19 RX ADMIN — Medication 5 ML: at 15:29

## 2022-09-19 RX ADMIN — MIRTAZAPINE 7.5 MG: 15 TABLET, FILM COATED ORAL at 22:43

## 2022-09-19 RX ADMIN — BETHANECHOL CHLORIDE 25 MG: 25 TABLET ORAL at 22:43

## 2022-09-19 RX ADMIN — SODIUM CHLORIDE 1 G: 1 TABLET ORAL at 18:22

## 2022-09-19 RX ADMIN — ENOXAPARIN SODIUM 40 MG: 100 INJECTION SUBCUTANEOUS at 09:41

## 2022-09-19 RX ADMIN — MOMETASONE FUROATE AND FORMOTEROL FUMARATE DIHYDRATE 2 PUFF: 100; 5 AEROSOL RESPIRATORY (INHALATION) at 07:47

## 2022-09-19 RX ADMIN — FUROSEMIDE 40 MG: 10 INJECTION, SOLUTION INTRAMUSCULAR; INTRAVENOUS at 18:23

## 2022-09-19 RX ADMIN — POTASSIUM CHLORIDE 20 MEQ: 20 TABLET, EXTENDED RELEASE ORAL at 22:52

## 2022-09-19 RX ADMIN — HYDROXYZINE HYDROCHLORIDE 25 MG: 25 TABLET, FILM COATED ORAL at 23:33

## 2022-09-19 RX ADMIN — ALPRAZOLAM 0.25 MG: 0.25 TABLET ORAL at 18:22

## 2022-09-19 RX ADMIN — FLUTICASONE PROPIONATE 1 SPRAY: 50 SPRAY, METERED NASAL at 09:42

## 2022-09-19 RX ADMIN — IPRATROPIUM BROMIDE AND ALBUTEROL SULFATE 1 AMPULE: 2.5; .5 SOLUTION RESPIRATORY (INHALATION) at 19:51

## 2022-09-19 RX ADMIN — CETIRIZINE HYDROCHLORIDE 5 MG: 5 TABLET, FILM COATED ORAL at 09:41

## 2022-09-19 RX ADMIN — SODIUM CHLORIDE 1 G: 1 TABLET ORAL at 09:47

## 2022-09-19 RX ADMIN — METHYLPREDNISOLONE SODIUM SUCCINATE 60 MG: 125 INJECTION, POWDER, FOR SOLUTION INTRAMUSCULAR; INTRAVENOUS at 18:23

## 2022-09-19 RX ADMIN — BUSPIRONE HYDROCHLORIDE 10 MG: 5 TABLET ORAL at 09:40

## 2022-09-19 RX ADMIN — METOPROLOL TARTRATE 5 MG: 1 INJECTION, SOLUTION INTRAVENOUS at 05:43

## 2022-09-19 RX ADMIN — METHYLPREDNISOLONE SODIUM SUCCINATE 60 MG: 125 INJECTION, POWDER, FOR SOLUTION INTRAMUSCULAR; INTRAVENOUS at 09:52

## 2022-09-19 RX ADMIN — SODIUM CHLORIDE, PRESERVATIVE FREE 10 ML: 5 INJECTION INTRAVENOUS at 05:45

## 2022-09-19 RX ADMIN — INSULIN GLARGINE 5 UNITS: 100 INJECTION, SOLUTION SUBCUTANEOUS at 09:50

## 2022-09-19 RX ADMIN — IPRATROPIUM BROMIDE AND ALBUTEROL SULFATE 1 AMPULE: 2.5; .5 SOLUTION RESPIRATORY (INHALATION) at 11:41

## 2022-09-19 RX ADMIN — METOPROLOL TARTRATE 5 MG: 1 INJECTION, SOLUTION INTRAVENOUS at 12:39

## 2022-09-19 RX ADMIN — ASPIRIN 81 MG CHEWABLE TABLET 81 MG: 81 TABLET CHEWABLE at 09:40

## 2022-09-19 RX ADMIN — IPRATROPIUM BROMIDE AND ALBUTEROL SULFATE 1 AMPULE: 2.5; .5 SOLUTION RESPIRATORY (INHALATION) at 07:47

## 2022-09-19 RX ADMIN — IPRATROPIUM BROMIDE AND ALBUTEROL SULFATE 1 AMPULE: 2.5; .5 SOLUTION RESPIRATORY (INHALATION) at 15:31

## 2022-09-19 RX ADMIN — BUSPIRONE HYDROCHLORIDE 7.5 MG: 5 TABLET ORAL at 22:46

## 2022-09-19 RX ADMIN — ISOSORBIDE MONONITRATE 60 MG: 60 TABLET, EXTENDED RELEASE ORAL at 09:48

## 2022-09-19 RX ADMIN — METOPROLOL TARTRATE 50 MG: 50 TABLET ORAL at 22:43

## 2022-09-19 RX ADMIN — SODIUM CHLORIDE, PRESERVATIVE FREE 10 ML: 5 INJECTION INTRAVENOUS at 09:51

## 2022-09-19 RX ADMIN — BETHANECHOL CHLORIDE 25 MG: 25 TABLET ORAL at 09:40

## 2022-09-19 RX ADMIN — LISINOPRIL 5 MG: 5 TABLET ORAL at 09:48

## 2022-09-19 ASSESSMENT — PAIN SCALES - GENERAL: PAINLEVEL_OUTOF10: 0

## 2022-09-19 NOTE — PROGRESS NOTES
Hospitalist Progress Note    Patient:  Martin Gifford     YOB: 1930    MRN: [id number]   Admit date: 9/15/2022     Acct: [de-identified]     PCP: Tana Barry MD    CC--Interval History:       CHF---acute diastolic----on diuretics---improved    HTN---converting patient from IV ---> PO BP medications with increase in dose of BB    ASCVD--Type 2 NSTEMI--9.15.2022----stable    Chronic dyspnea---on supplemental oxygen    BLE cellulitis--excoriations----UNNA boots in place---due for change     HTN    Dementia--severe--currently pleasant and cooperative with examination    See note below      ADULT DIET;  Regular    Medications:  Scheduled Meds:   isosorbide mononitrate  60 mg Oral Daily    methylPREDNISolone  60 mg IntraVENous Q8H    sodium chloride  1 g Oral BID WC    insulin glargine  5 Units SubCUTAneous Daily    insulin lispro  0-4 Units SubCUTAneous TID WC    insulin lispro  0-4 Units SubCUTAneous Nightly    enoxaparin  40 mg SubCUTAneous Daily    furosemide  40 mg IntraVENous BID    sodium chloride flush  10 mL IntraVENous 2 times per day    ipratropium-albuterol  1 ampule Inhalation Q4H WA    aspirin  81 mg Oral Daily    bethanechol  25 mg Oral 4x Daily    mometasone-formoterol  2 puff Inhalation BID    busPIRone  10 mg Oral QAM    busPIRone  7.5 mg Oral Nightly    fluticasone  1 spray Nasal Daily    levothyroxine  50 mcg Oral Daily    lisinopril  5 mg Oral Daily    cetirizine  5 mg Oral Daily    mirtazapine  7.5 mg Oral Nightly    PARoxetine  10 mg Oral Nightly    potassium chloride  20 mEq Oral BID    metoprolol  5 mg IntraVENous Q6H     Continuous Infusions:   dextrose      sodium chloride       PRN Meds:glucose, dextrose bolus **OR** dextrose bolus, glucagon (rDNA), dextrose, enalaprilat, guaiFENesin-codeine, albuterol, sodium chloride flush, sodium chloride, ondansetron, acetaminophen, hydrOXYzine HCl, nitroGLYCERIN, ALPRAZolam    Objective:  Labs:  CBC with Differential:    Lab Results Component Value Date/Time    WBC 8.6 09/19/2022 05:26 AM    RBC 4.16 09/19/2022 05:26 AM    HGB 11.9 09/19/2022 05:26 AM    HCT 36.6 09/19/2022 05:26 AM     09/19/2022 05:26 AM    MCV 88.0 09/19/2022 05:26 AM    MCH 28.6 09/19/2022 05:26 AM    MCHC 32.5 09/19/2022 05:26 AM    RDW 14.8 09/19/2022 05:26 AM    LYMPHOPCT 8 09/19/2022 05:26 AM    MONOPCT 1 09/19/2022 05:26 AM    BASOPCT 0 09/19/2022 05:26 AM    MONOSABS 0.09 09/19/2022 05:26 AM    LYMPHSABS 0.69 09/19/2022 05:26 AM    EOSABS 0.00 09/19/2022 05:26 AM    BASOSABS 0.00 09/19/2022 05:26 AM    DIFFTYPE NOT REPORTED 05/09/2018 06:03 AM     BMP:    Lab Results   Component Value Date/Time     09/19/2022 05:26 AM    K 4.1 09/19/2022 05:26 AM    CL 95 09/19/2022 05:26 AM    CO2 36 09/19/2022 05:26 AM    BUN 25 09/19/2022 05:26 AM    LABALBU 4.3 09/15/2022 08:39 AM    CREATININE 0.52 09/19/2022 05:26 AM    CREATININE 0.7 05/14/2019 12:00 AM    CALCIUM 9.0 09/19/2022 05:26 AM    GFRAA >60 09/19/2022 05:26 AM    LABGLOM >60 09/19/2022 05:26 AM    GLUCOSE 161 09/19/2022 05:26 AM           Physical Exam:  Vitals: BP (!) 156/60   Pulse 76   Temp 97.7 °F (36.5 °C) (Tympanic)   Resp 16   Ht 5' 4\" (1.626 m)   Wt 117 lb 1.6 oz (53.1 kg)   SpO2 94%   BMI 20.10 kg/m²   24 hour intake/output:  Intake/Output Summary (Last 24 hours) at 9/19/2022 1245  Last data filed at 9/19/2022 1120  Gross per 24 hour   Intake 1080 ml   Output 1150 ml   Net -70 ml     Last 3 weights: Wt Readings from Last 3 Encounters:   09/19/22 117 lb 1.6 oz (53.1 kg)   08/04/22 135 lb (61.2 kg)   08/04/22 126 lb 9.6 oz (57.4 kg)     HEENT: Normocephalic and Atraumatic  Neck: Supple, No Masses, Tenderness, Nodularity, and No Lymphadenopathy  Chest/Lungs: Distant Breath Sounds  Cardiac: Regular Rate and Rhythm  GI/Abdomen:  Bowel Sounds Present and Soft, Non-tender, without Guarding or Rebound Tenderness  : Not examined  EXT/Skin:  UNNA boot bilaterally  Neuro:  alert--generalized weakness----dementia = baseline      Assessment:    Principal Problem:    Acute on chronic systolic CHF (congestive heart failure) ([facility])  Active Problems:    Acute on chronic congestive heart failure ([address]  Resolved Problems:    * No resolved hospital problems.  *    Waqar Lou.         ?       89  WF  [bobo Ramachandran, IM---[facility];  [facility]---[facility]]  DNR-CCA       HEPARIN continuous infusion    SUPPLEMENTAL OXYGEN---2 liters    UNNA BOOTS---BLE    Anti-infectives:   --------                                            CHF---acute diastolic----9.15.2022                                                    2D ECHO----9.16.2022---mild-to-moderate LVH--mildly reduced LVSF---                                                                     NRVSF---MAC--MV thickening--trivial MR--AV calcification----                                                                     adequate opening---mild TR---RVSP ~ 39 mm Hg--AR upper                                                                     limits normal 3.7 cm--IVC normal diameter and inspiratory collapse---                                                                     Grade II moderate DD---LVEF ~ 45%                                                    CTA chest--pulmonary--9.15.2022---no PE---patchy airspace                                                                   disease--lower lung fields--multifocal infection or aspiration                                                                   considerations-----up to 5.7 cm ascending thoracic aortic aneurysm                                                    Elevated d-dimer---9.15.2022                                                    ProBNP--9.15.2022----6055   Dyspnea---hypoxia---9.15.2022   Hypertensive urgency---9.15.2022   Elevated troponin----9.15.2022---59-61   Type 2 NSTEMI---9.15.2022             CXR---9.15.2022---scattered interstitial prominence---mildly increased at lung bases likely chronic interstitial change with superposed                                    edema or infiltrate              EKG---9.15.2022---SR--93--LAE--LBBB---old inferior infarction              EKG---9.15.2022---sinus tachycardia---112---PACs--LAD--LBBB---old inferior infarction              EKG---8.4.2022----NSR---68---LAD--LVH--LBBB---old inferior infarction  Hyponatremia   Dysphagia              MBS--9.19.2022---normal----thin--regular      Prior:             2D ECHO---5.8.2018---mild concentric LVH--NLVSF--hyperdynamic LV--                        mild increased LVOT flow velocity---NRVSF---MAC--trivial MR--                        SOUMYA without stenosis--mild TR---RVSP ~ 40 mm Hg----normal AR                        3.1 cm---Grade I mild DD---LVEF > 65%            MI ruled out---5. 8.2018            EKG---5.7.2018---NSR--70---LAD----LBBB                      2D ECHO---2.17.2017--normal chamber sizes----NLVSF--                        NRVSF--MAC---trivial TR--SOUMYA--trivial AR----mild                         TR---RVSP ~ 29 mm Hg---Grade 1 DD----LVEF ~ 55%            Hypotension            Bradycardia   Hyponatremia     LBBB--see above   II/VI LISETH  Hypertension  Hyperlipidemia  COPD  CKD---Stage 2  Chronic mucopurulent bronchitis  Dementia---paranoia--delusions--phobic behavior  Gait-balance instability  Anemia----chronic  Anxiety  PMH:   tension headaches, dermatophytosis---toe nails, hyponatremia, substance                abuse---theophylline, Hypertensive urgency----2018, left sided chest pain---               facial numbness---aphasia----5. 7.2018, Vitamin D deficiency  PSH:    excision mass---LN right neck---age 13, T&A, UTI--- 2018--yeast, urinary               retention---cross--2018     Allergies:        clindamycin---rash, macrolides--all mycins--Zithromax--rash,                          penicillin-----rash, tetracyclines---rash  Intolerances:   Norvasc--amlodipine         Plan: CHF---daily weight--IO--Lasix     HTN---IV --> PO Lopressor 50 BID     BLE cellulitis---UNNA boots     See orders     Electronically signed by Horace Goyal MD on 9/19/2022 at 12:45 PM    Hospitalist

## 2022-09-19 NOTE — PROGRESS NOTES
SPEECH THERAPY  MODIFIED BARIUM SWALLOW STUDY    [] [id number] [facility]  [x] Inpatient- [facility]    Patient: Camden Melchor  YOB: 1930  Gender: female  MRN:  [id number]  Referring Physician: Dr. Jose Eduardo Atkins  Diagnosis:  Dysphagia, CHF     Date of Study: 9/19/22    History of Present Illness/Injury: Patient was seen for bedside swallow evaluation on 9/16/22. The BSE was ordered secondary to results of chest x-ray, indicating the following:   Impression   1. No evidence for acute pulmonary embolism. 2.  Patchy airspace disease in the lower lung fields for which multifocal   infection or aspiration are considerations. 3.  Thoracic aortic aneurysm measuring up to 5.7 cm in the ascending segment. Bedside was completed, with MBSS to rule out silent aspiration.     Past Medical History:   Diagnosis Date    Arthritis     Chronic kidney disease     h/o mild renal insufficency    COPD (chronic obstructive pulmonary disease) (HCC)     Dyspnea     chronic    Hypertension     Hyponatremia     Left bundle branch block     chronic, old    Paranoia ([facility] [address]     paranoia ideation, delusions, phobic behavior in past, seems to wax and wane    Substance abuse (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     Reported Dysphagia Symptoms:  []Coughing []Food catching in throat  []Drooling  []Reflux               []Wet Vocal Quality  [x]Other: no dysphagia symptoms reported    Reason for referral:   [x]Assess physiology of swallow   [x]Determine appropriate diet, posture, or compensatory strategies  [x]Rule out aspiration    []Other:      Prior MBSS:  [x]No   []Yes    Date:      Results:      Pain  [x]No     []Yes      Location:  N/A  Pain Rating (0-10 pain scale): 0  Pain Description:  N/A    Current Diet: [] NPO [x] Regular diet [] Soft and bite sized (Dysphagia III)  [] Minced and moist (Dysphagia II)   [] Pureed (Dysphagia I)  [] Liquidised       Current Liquids: [x] Thin [] Mildly Thick (Nectar thick)  [] Moderately Thick (Honey thick) [] Extremely Thick (Spoon-Pudding)    Respiratory Status: [] Independent [x] Nasal Cannula [] Oxygen Mask        [] Tracheostomy [] Other:       [] Ventilator/Settings:    Behavioral Observation: [x] Alert   [] Oriented   [] Confused  [] Lethargic      [] Dysarthric  [] Limited Direction Following  [] Agitated  [x] Other: followed simple directions    Patient was evaluated using:   [x] Thin liquid   [] Mildly thick (Nectar thick) liquid  [] Moderately thick (Honey thick) liquid/Liquidised   [] Extremely thick (Pudding thick) liquid      [x] Solid [x] Soft and bite sized [] Minced and moist [x] Puree  [] barium tablet      Oral Peripheral/Pharyngeal Mechanism Exam     Lingual Function   ROM [x]WFL              []Limited:    []elevation     []depression     []protrusion       []retraction     []lateralization     Strength [x]WFL  []Weak:   []left  []right      Sensation: []WFL  [x]Other:  slightly decreased posterior    Symmetry [x]WFL  []Deviation:     []left             []right      Coordination [x]WFL  []Other:      Labial Function   ROM [x]WFL    []Limited:    []protrusion      []retraction     []rounding     Strength [x]WFL  []Weak:   []left  []right      Sensation: [x]WFL  []Other:      Symmetry [x]WFL  []Deviation:     []left             []right      Coordination [x]WFL  []Other:       Drooling:  [x]No  []Yes:   []left []right    Facial Appearance   Facial Weakness  [x]No []Yes:   []left []right      Dentition   [x]natural, condition:    []good  []fair  [x]Poor (sporadic)   []edentulous   []partials:   []upper []lower []loose []not worn for MBSS   []dentures:   []upper []lower []loose []not worn for MBSS        ORAL PREPARATION PHASE: [x] WFL  [] Impaired/Reduced   [x] Slow mastication- secondary to dentition    [] Uncoordinated mastication    [] Decreased bolus control   [] Decreased bolus formation   [] Loss of bolus from lips / Anterior spillage   [] OTHER:       ORAL PHASE: [] WFL              [x] Impaired/Reduced   [] Residue in the anterior sulcus                  [] Residue in the lateral sulcus - right   [] Residue in the lateral sulcus - left              [] Uncoordinated AP movement   [] Slow AP movement                            [] Decreased lingual elevation   [] Decreased tongue base retraction   [x] Uncontrolled bolus/diffuse fall over tongue base- spill to valleculae with puree and cracker and pyriform sinus with  thin and fruit   [x] Piecemeal deglutition- with cracker     [x] Base of tongue residue- cracker only, clears with secondary swallow   [] OTHER:     ORAL PHASE GREG SCORE: (Dysphagia outcome and severity scale)  [] 7 = Normal in all situations  [] 6 = WFL / Mod I; Normal diet; May have mild oral delay  [x] 5 = Mild dysphagia; May have one diet consistency restricted; Mild oral residue -clears. [] 4 = Mild-Moderate dysphagia; May have one or two diet consistencies restricted; Oral residue clears with cue; Intermittent supervision or cueing. [] 3 = Moderate dysphagia; Total assist with strategies; Two or more consistencies restricted; Moderate oral residue clears with cue;   [] 2 = Moderately Severe dysphagia; Tolerates at least one consistency safely with total use of strategies; Severe oral residue and unable to clear or needs multiple cues; Non-oral nutrition. [] 1 = Severe dysphagia; NPO; Unable to tolerate any po safely; Severe oral loss of bolus or oral residue; Non-oral nutrition.         PHARYNGEAL PHASE: [] WFL  [x] Impaired   [] Absent Swallow               [x] Delayed Swallow              [x] Decreased airway protection   [x] Decreased epiglottic inversion-partial inversion    [] Decreased hyolaryngeal elevation   [x] Residue in the valleculae-trace              [x] Residue in the pyriform sinus-trace    [] Cricopharyngeal dysfunction             [] Residue along posterior pharyngeal wall     [] Residue along the ariepiglottic folds    [] Decreased pharyngeal contraction   [x] OTHER: decreased hyoid protraction    PHARYNGEAL PHASE GREG SCORE: (Dysphagia outcome and severity scale)  [] 7 = Normal in all situations; Normal diet; No strategies  [] 6 = WFL / Mod I; May have mild pharyngeal delay or residue but clears spontaneously; No aspiration or laryngeal penetration  [x] 5 = Mild dysphagia:  May need one consistency restricted; May have one or more of the following:  Aspiration with thin - cough to clear; Airway penetration midway to the vocal cords with one or more consistency or to the vocal folds with one consistency, but clears spontaneously; Residue in the pharynx clears spontaneously. [] 4 = Mild - Moderate dysphagia: One or two consistencies restricted; May exhibit one or more of the following:  Residue clears with cue; Aspiration of one consistency with weak or no reflexive cough; Laryngeal penetration to the vocal cords with cough with two consistencies; Laryngeal penetration to the vocal cords without cough on one consistency. [] 3 = Moderate dysphagia:  Two or more diet consistencies restricted; May exhibit one or more of the following: Moderate residue clears with cue; Airway penetration to the level of the vocal folds without cough with two or more consistencies; Aspiration with two consistencies with weak or no reflexive cough; Aspiration of one consistency, no cough and airway penetration with one consistency, no cough. [] 2 = Moderately Severe dysphagia: Tolerates at least one consistency safely with total use of strategies; Non-oral nutrition; Severe residue unable to clear; Aspiration with two or more consistencies with no cough; Aspiration with one or more consistency, no cough and airway penetration to the vocal folds with one or more consistency, no cough.   [] 1 = Severe dysphagia:  NPO; Unable to tolerate any po safely; Severe residue unable to clear; Silent aspiration with two or more consistencies, non-functional cough;  OR Unable to achieve a swallow.         SIGNS AND SYMPTOMS OF LARYNGEAL PENETRATION / ASPIRATION:  [] No evidence of laryngeal penetration  [x] No evidence of aspiration  [x] Laryngeal penetration evident with: thin liquids only  [] Audible aspiration evident with:  [] Silent aspiration evident with:    PENETRATION-ASPIRATION SCALE (PAS):  [x] 1 = Material does not enter the airway- PUREE, SOFT AND BITE SIZED, REGULAR  [x] 2 = Material enters the airway, remains above the vocal folds, and is ejected from the airway- THIN LIQUIDS  [] 3 = Material enters the airway, remains above the vocal folds, and is not ejected from airway  [] 4 = Material enters the airway, contacts the vocal folds, and is ejected from the airway  [] 5 = Material enters the airway, contacts the vocal folds, and is not ejected from the airway  [] 6 = Material enters the airway, passes below the vocal folds, and is ejected into the larynx or out of the airway  [] 7 = Material enters the airway, passes below the vocal folds, and is not ejected from the trachea despite effort  [] 8 = Material enters the airway, passes below the vocal folds, and no effort is made to eject    ESOPHAGEAL PHASE:   [x] No significant findings  [] See Radiology Report for details  [] Recommend further esophageal testing    ATTEMPTED TECHNIQUES:                                 Comments:  []Small bolus size [] Effective []Not Effective    []Straw [] Effective []Not Effective    []Cup [] Effective []Not Effective    []Chin tuck [] Effective []Not Effective    []Head Turn [] Effective []Not Effective    []Spoon presentations [] Effective []Not Effective    [] Volitional cough [] Effective []Not Effective    []Spontaneous cough [] Effective []Not Effective    []OTHER: [] Effective []Not Effective      DIAGNOSTIC IMPRESSIONS:  This patient presents with mild oropharyngeal dysphagia, characterized by the identified dysfunctions with the tested consisencies. FLASH penetration noted with thin liquids this date, with no other evidence of penetration and no aspiration noted on this date. Oral stage characterized by slow mastication and bolus formation secondary to limited dentition. Given extended time, she was able to adequately masticate all provided food items. Premature spillage to the vallecuae and inconsistently to the pyriform sinus was noted. Upon initiating a swallow, patient had adequate laryngeal elevation, but hyoid protraction was reduced, which impacted the epiglotis from fully inverting. Lack of complete inversion, along with reduced hyoid protraction resulted in trace residuals, as hyoid protraction assists with not only inverting the epiglottis, but also opening the UES fully. Trace residuals were in the valleculae and in the pyriform sinus following trials of thin liquids, in pyriform sinus following fruit, and in the valleculae and on the base of tongue following cracker. Spontaneous initiation of secondary swallow was effective to clear most residuals. Patient is at mild risk for pulmonary compromise and aspiration at this time. Okay to continue with regular diet + thin liquids with use of safe swallowing strategies.      DIET RECOMMENDATIONS:    Diet:  [] NPO [x] Regular diet   [] Soft and bite sized (Dysphagia III)  [] Minced and moist (Dysphagia II)   [] Pureed (Dysphagia I)  [] Liquidised       Liquids: [x] Thin [] Mildly Thick (Nectar thick)  [] Moderately Thick (Honey thick) [] Extremely Thick (Spoon-Pudding)        STRATEGIES:   [x] Full upright position [x] Small bite/sip   [] No Straw   [] Multiple Swallow  [] Chin tuck   [] Head turn   [] Pulmonary monitoring [] Oral care after all meals  [] Supervision  [] Medication in applesauce   [] Direct 1:1 Supervision [] Spoon all liquids   [x] Alternate solid / liquid [] Limit distractions   [] Monitor for fatigue [] PMV in place for all po   [] OTHER: intermittent supervision    PATIENT STRENGTHS:  [] Motivated [x] Cooperative   [] Good family support    [] Prior level of function  [] OTHER:    REHAB POTENTIAL:   [] Excellent [x] Good [] Fair  [] Poor    EDUCATION:   Learner: [x] Patient            [] Significant other                                   [] Son/Daughter [] Parent [x] Other: indicated results of MBSS to Dr. Murphy Nicholson     Education: [x] Reviewed results and recommendations of this evaluation     [x] Reviewed diet and strategies     [x] Reviewed signs, symptoms and risk of aspiration     [] Demonstrated how to thick liquid appropriately. [] Reviewed goals and Plan of Care     [] OTHER:     Method: [x] Discussion  [] Demonstration [] Hand-out     [] Other:     Evaluation of Education:     [] Verbalizes understanding      [x] Demonstrates with assistance     [] Demonstrates without assistance                                  []Needs further instruction      [] No evidence of learning                                    [] Family not present    PATIENT GOALS: [] Pt did not state; will further assess during treatment. [] Return to the least restricted diet possible     [] Return to previous level of function     [x] Other: continue with regular diet/thin liquids    PLAN / TREATMENT RECOMMENDATIONS:  [x] No further speech therapy services indicated. [] Speech Therapy evaluation to assess speech, language, cognition and/or voice  [] Skilled dysphagia treatment ___ times per week for ___ weeks.   [] OTHER:    Electronically signed by: Chandrika Alcocer M.S., CCC-SLP            Date: 9/19/2022

## 2022-09-19 NOTE — PROGRESS NOTES
Unna boots applied to bilat LE per recommendations from wound care nurse. Pt tolerated procedure without complaint. Wound care nurse stated she will check unna boots on Tuesday morning to verify application and effectiveness.

## 2022-09-19 NOTE — PLAN OF CARE
Problem: Discharge Planning  Goal: Discharge to home or other facility with appropriate resources  Outcome: Progressing     Problem: Safety - Adult  Goal: Free from fall injury  Outcome: Progressing     Problem: ABCDS Injury Assessment  Goal: Absence of physical injury  Outcome: Progressing     Problem: Confusion  Goal: Confusion, delirium, dementia, or psychosis is improved or at baseline  Description: INTERVENTIONS:  1. Assess for possible contributors to thought disturbance, including medications, impaired vision or hearing, underlying metabolic abnormalities, dehydration, psychiatric diagnoses, and notify attending LIP  2. [place] high risk fall precautions, as indicated  3. Provide frequent short contacts to provide reality reorientation, refocusing and direction  4. Decrease environmental stimuli, including noise as appropriate  5. Monitor and intervene to maintain adequate nutrition, hydration, elimination, sleep and activity  6. If unable to ensure safety without constant attention obtain sitter and review sitter guidelines with assigned personnel  7.  Initiate Psychosocial CNS and Spiritual Care consult, as indicated  Outcome: Progressing     Problem: Chronic Conditions and Co-morbidities  Goal: Patient's chronic conditions and co-morbidity symptoms are monitored and maintained or improved  Outcome: Progressing     Problem: Pain  Goal: Verbalizes/displays adequate comfort level or baseline comfort level  Outcome: Progressing

## 2022-09-19 NOTE — PROGRESS NOTES
Physical Therapy    Attempted to see patient this date. Patient relates being too tired to participate in therapy at this time. Agreed to attempt later this date. Will attempt this afternoon.      Sierra Zhao PTA

## 2022-09-19 NOTE — PROGRESS NOTES
Physical Therapy    Patient in bedside chair upon arrival. States she is out of breath and cannot participate with this PTA in therapy session. Discussed purpose of therapy and benefits. Understanding noted, states she might be more up for it tomorrow. Will re-visit on 9/19/22.      Ben Santos PTA

## 2022-09-20 VITALS
HEIGHT: 64 IN | OXYGEN SATURATION: 95 % | RESPIRATION RATE: 14 BRPM | TEMPERATURE: 98.4 F | DIASTOLIC BLOOD PRESSURE: 63 MMHG | WEIGHT: 119 LBS | SYSTOLIC BLOOD PRESSURE: 139 MMHG | HEART RATE: 65 BPM | BODY MASS INDEX: 20.32 KG/M2

## 2022-09-20 LAB
ABSOLUTE EOS #: 0 K/UL (ref 0–0.4)
ABSOLUTE IMMATURE GRANULOCYTE: 0 K/UL (ref 0–0.3)
ABSOLUTE LYMPH #: 0.78 K/UL (ref 1–4.8)
ABSOLUTE MONO #: 0.11 K/UL (ref 0.1–1.2)
ANION GAP SERPL CALCULATED.3IONS-SCNC: 4 MMOL/L (ref 9–17)
BASOPHILS # BLD: 0 % (ref 0–1)
BASOPHILS ABSOLUTE: 0 K/UL (ref 0–0.2)
BUN BLDV-MCNC: 27 MG/DL (ref 8–23)
BUN/CREAT BLD: 43 (ref 9–20)
CALCIUM SERPL-MCNC: 8.9 MG/DL (ref 8.6–10.4)
CHLORIDE BLD-SCNC: 91 MMOL/L (ref 98–107)
CO2: 40 MMOL/L (ref 20–31)
CREAT SERPL-MCNC: 0.63 MG/DL (ref 0.5–0.9)
CULTURE: NORMAL
CULTURE: NORMAL
EOSINOPHILS RELATIVE PERCENT: 0 % (ref 1–7)
GFR AFRICAN AMERICAN: >60 ML/MIN
GFR NON-AFRICAN AMERICAN: >60 ML/MIN
GFR SERPL CREATININE-BSD FRML MDRD: ABNORMAL ML/MIN/{1.73_M2}
GLUCOSE BLD-MCNC: 116 MG/DL (ref 65–105)
GLUCOSE BLD-MCNC: 134 MG/DL (ref 70–99)
GLUCOSE BLD-MCNC: 150 MG/DL (ref 65–105)
HCT VFR BLD CALC: 40 % (ref 36.3–47.1)
HEMOGLOBIN: 12.7 G/DL (ref 11.9–15.1)
IMMATURE GRANULOCYTES: 0 %
LYMPHOCYTES # BLD: 7 % (ref 16–46)
MCH RBC QN AUTO: 28 PG (ref 25.2–33.5)
MCHC RBC AUTO-ENTMCNC: 31.8 G/DL (ref 25.2–33.5)
MCV RBC AUTO: 88.3 FL (ref 82.6–102.9)
MONOCYTES # BLD: 1 % (ref 4–11)
MORPHOLOGY: ABNORMAL
MORPHOLOGY: ABNORMAL
NRBC AUTOMATED: 0 PER 100 WBC
PDW BLD-RTO: 14.5 % (ref 11.8–14.4)
PLATELET # BLD: 352 K/UL (ref 138–453)
PMV BLD AUTO: 9.2 FL (ref 8.1–13.5)
POTASSIUM SERPL-SCNC: 4.4 MMOL/L (ref 3.7–5.3)
RBC # BLD: 4.53 M/UL (ref 3.95–5.11)
SEG NEUTROPHILS: 92 % (ref 43–77)
SEGMENTED NEUTROPHILS ABSOLUTE COUNT: 10.31 K/UL (ref 1.5–8.1)
SODIUM BLD-SCNC: 135 MMOL/L (ref 135–144)
SPECIMEN DESCRIPTION: NORMAL
SPECIMEN DESCRIPTION: NORMAL
WBC # BLD: 11.2 K/UL (ref 3.5–11.3)

## 2022-09-20 PROCEDURE — 6360000002 HC RX W HCPCS: Performed by: INTERNAL MEDICINE

## 2022-09-20 PROCEDURE — 6360000002 HC RX W HCPCS: Performed by: NURSE PRACTITIONER

## 2022-09-20 PROCEDURE — 6370000000 HC RX 637 (ALT 250 FOR IP): Performed by: INTERNAL MEDICINE

## 2022-09-20 PROCEDURE — 97530 THERAPEUTIC ACTIVITIES: CPT

## 2022-09-20 PROCEDURE — 6360000002 HC RX W HCPCS: Performed by: FAMILY MEDICINE

## 2022-09-20 PROCEDURE — 80048 BASIC METABOLIC PNL TOTAL CA: CPT

## 2022-09-20 PROCEDURE — 99238 HOSP IP/OBS DSCHRG MGMT 30/<: CPT | Performed by: INTERNAL MEDICINE

## 2022-09-20 PROCEDURE — 2580000003 HC RX 258: Performed by: INTERNAL MEDICINE

## 2022-09-20 PROCEDURE — 85025 COMPLETE CBC W/AUTO DIFF WBC: CPT

## 2022-09-20 PROCEDURE — 36415 COLL VENOUS BLD VENIPUNCTURE: CPT

## 2022-09-20 PROCEDURE — 97535 SELF CARE MNGMENT TRAINING: CPT

## 2022-09-20 PROCEDURE — 82947 ASSAY GLUCOSE BLOOD QUANT: CPT

## 2022-09-20 PROCEDURE — 94640 AIRWAY INHALATION TREATMENT: CPT

## 2022-09-20 PROCEDURE — 2500000003 HC RX 250 WO HCPCS: Performed by: NURSE PRACTITIONER

## 2022-09-20 PROCEDURE — 94760 N-INVAS EAR/PLS OXIMETRY 1: CPT

## 2022-09-20 PROCEDURE — 6370000000 HC RX 637 (ALT 250 FOR IP): Performed by: NURSE PRACTITIONER

## 2022-09-20 PROCEDURE — 2700000000 HC OXYGEN THERAPY PER DAY

## 2022-09-20 RX ORDER — SODIUM CHLORIDE 1000 MG
1 TABLET, SOLUBLE MISCELLANEOUS 2 TIMES DAILY WITH MEALS
Qty: 60 TABLET | Refills: 0
Start: 2022-09-20

## 2022-09-20 RX ORDER — HYDROXYZINE PAMOATE 25 MG/1
25 CAPSULE ORAL NIGHTLY PRN
Qty: 30 CAPSULE | Refills: 0 | Status: SHIPPED | OUTPATIENT
Start: 2022-09-20

## 2022-09-20 RX ORDER — BUSPIRONE HYDROCHLORIDE 10 MG/1
10 TABLET ORAL EVERY MORNING
Qty: 30 TABLET | Refills: 0 | Status: SHIPPED | OUTPATIENT
Start: 2022-09-20

## 2022-09-20 RX ORDER — METOPROLOL TARTRATE 50 MG/1
50 TABLET, FILM COATED ORAL 2 TIMES DAILY
Qty: 60 TABLET | Refills: 0
Start: 2022-09-20

## 2022-09-20 RX ORDER — ISOSORBIDE MONONITRATE 60 MG/1
60 TABLET, EXTENDED RELEASE ORAL DAILY
Qty: 30 TABLET | Refills: 0
Start: 2022-09-21

## 2022-09-20 RX ORDER — PREDNISONE 20 MG/1
40 TABLET ORAL DAILY
Qty: 8 TABLET | Refills: 0
Start: 2022-09-20 | End: 2022-09-24

## 2022-09-20 RX ORDER — MIRTAZAPINE 7.5 MG/1
7.5 TABLET, FILM COATED ORAL NIGHTLY
Qty: 30 TABLET | Refills: 0 | Status: SHIPPED | OUTPATIENT
Start: 2022-09-20

## 2022-09-20 RX ORDER — BUSPIRONE HYDROCHLORIDE 7.5 MG/1
7.5 TABLET ORAL NIGHTLY
Qty: 30 TABLET | Refills: 0 | Status: SHIPPED | OUTPATIENT
Start: 2022-09-20

## 2022-09-20 RX ORDER — PAROXETINE 10 MG/1
10 TABLET, FILM COATED ORAL NIGHTLY
Qty: 30 TABLET | Refills: 0 | Status: SHIPPED | OUTPATIENT
Start: 2022-09-20

## 2022-09-20 RX ORDER — FUROSEMIDE 40 MG/1
40 TABLET ORAL DAILY
Qty: 30 TABLET | Refills: 0
Start: 2022-09-20

## 2022-09-20 RX ADMIN — SODIUM CHLORIDE, PRESERVATIVE FREE 10 ML: 5 INJECTION INTRAVENOUS at 02:21

## 2022-09-20 RX ADMIN — LISINOPRIL 5 MG: 5 TABLET ORAL at 08:24

## 2022-09-20 RX ADMIN — Medication 5 ML: at 03:58

## 2022-09-20 RX ADMIN — BETHANECHOL CHLORIDE 25 MG: 25 TABLET ORAL at 08:16

## 2022-09-20 RX ADMIN — MOMETASONE FUROATE AND FORMOTEROL FUMARATE DIHYDRATE 2 PUFF: 100; 5 AEROSOL RESPIRATORY (INHALATION) at 08:31

## 2022-09-20 RX ADMIN — BETHANECHOL CHLORIDE 25 MG: 25 TABLET ORAL at 17:01

## 2022-09-20 RX ADMIN — CETIRIZINE HYDROCHLORIDE 5 MG: 5 TABLET, FILM COATED ORAL at 08:16

## 2022-09-20 RX ADMIN — METHYLPREDNISOLONE SODIUM SUCCINATE 60 MG: 125 INJECTION, POWDER, FOR SOLUTION INTRAMUSCULAR; INTRAVENOUS at 08:38

## 2022-09-20 RX ADMIN — IPRATROPIUM BROMIDE AND ALBUTEROL SULFATE 1 AMPULE: 2.5; .5 SOLUTION RESPIRATORY (INHALATION) at 12:14

## 2022-09-20 RX ADMIN — LEVOTHYROXINE SODIUM 50 MCG: 0.03 TABLET ORAL at 06:42

## 2022-09-20 RX ADMIN — SODIUM CHLORIDE 1 G: 1 TABLET ORAL at 08:26

## 2022-09-20 RX ADMIN — INSULIN GLARGINE 5 UNITS: 100 INJECTION, SOLUTION SUBCUTANEOUS at 08:22

## 2022-09-20 RX ADMIN — IPRATROPIUM BROMIDE AND ALBUTEROL SULFATE 1 AMPULE: 2.5; .5 SOLUTION RESPIRATORY (INHALATION) at 08:30

## 2022-09-20 RX ADMIN — ENOXAPARIN SODIUM 40 MG: 100 INJECTION SUBCUTANEOUS at 08:16

## 2022-09-20 RX ADMIN — ISOSORBIDE MONONITRATE 60 MG: 60 TABLET, EXTENDED RELEASE ORAL at 08:23

## 2022-09-20 RX ADMIN — SODIUM CHLORIDE 1 G: 1 TABLET ORAL at 17:01

## 2022-09-20 RX ADMIN — FLUTICASONE PROPIONATE 1 SPRAY: 50 SPRAY, METERED NASAL at 08:18

## 2022-09-20 RX ADMIN — ENALAPRILAT 1.25 MG: 1.25 INJECTION INTRAVENOUS at 04:02

## 2022-09-20 RX ADMIN — ALPRAZOLAM 0.25 MG: 0.25 TABLET ORAL at 17:01

## 2022-09-20 RX ADMIN — POTASSIUM CHLORIDE 20 MEQ: 20 TABLET, EXTENDED RELEASE ORAL at 08:26

## 2022-09-20 RX ADMIN — Medication 5 ML: at 17:38

## 2022-09-20 RX ADMIN — SODIUM CHLORIDE, PRESERVATIVE FREE 10 ML: 5 INJECTION INTRAVENOUS at 04:01

## 2022-09-20 RX ADMIN — METHYLPREDNISOLONE SODIUM SUCCINATE 60 MG: 125 INJECTION, POWDER, FOR SOLUTION INTRAMUSCULAR; INTRAVENOUS at 02:21

## 2022-09-20 RX ADMIN — METOPROLOL TARTRATE 50 MG: 50 TABLET ORAL at 08:24

## 2022-09-20 RX ADMIN — ASPIRIN 81 MG CHEWABLE TABLET 81 MG: 81 TABLET CHEWABLE at 08:15

## 2022-09-20 RX ADMIN — BUSPIRONE HYDROCHLORIDE 10 MG: 5 TABLET ORAL at 08:16

## 2022-09-20 RX ADMIN — SODIUM CHLORIDE, PRESERVATIVE FREE 10 ML: 5 INJECTION INTRAVENOUS at 08:26

## 2022-09-20 RX ADMIN — BETHANECHOL CHLORIDE 25 MG: 25 TABLET ORAL at 12:29

## 2022-09-20 RX ADMIN — FUROSEMIDE 40 MG: 10 INJECTION, SOLUTION INTRAMUSCULAR; INTRAVENOUS at 08:18

## 2022-09-20 ASSESSMENT — PAIN SCALES - GENERAL: PAINLEVEL_OUTOF10: 0

## 2022-09-20 NOTE — PLAN OF CARE
Problem: Discharge Planning  Goal: Discharge to home or other facility with appropriate resources  9/19/2022 2341 by Eleonora Melton RN  Outcome: Progressing  Flowsheets (Taken 9/19/2022 2031)  Discharge to home or other facility with appropriate resources:   Identify barriers to discharge with patient and caregiver   Arrange for needed discharge resources and transportation as appropriate   Identify discharge learning needs (meds, wound care, etc)   Refer to discharge planning if patient needs post-hospital services based on physician order or complex needs related to functional status, cognitive ability or social support system  9/19/2022 1413 by Kiara Rodriguez RN  Outcome: Progressing     Problem: Safety - Adult  Goal: Free from fall injury  9/19/2022 2341 by Eleonora Melton RN  Outcome: Progressing  9/19/2022 1413 by Kiara Rodriguez RN  Outcome: Progressing     Problem: ABCDS Injury Assessment  Goal: Absence of physical injury  9/19/2022 2341 by Eleonora Melton RN  Outcome: Progressing  9/19/2022 1413 by Kiara Rodriguez RN  Outcome: Progressing     Problem: Confusion  Goal: Confusion, delirium, dementia, or psychosis is improved or at baseline  Description: INTERVENTIONS:  1. Assess for possible contributors to thought disturbance, including medications, impaired vision or hearing, underlying metabolic abnormalities, dehydration, psychiatric diagnoses, and notify attending LIP  2. [place] high risk fall precautions, as indicated  3. Provide frequent short contacts to provide reality reorientation, refocusing and direction  4. Decrease environmental stimuli, including noise as appropriate  5. Monitor and intervene to maintain adequate nutrition, hydration, elimination, sleep and activity  6. If unable to ensure safety without constant attention obtain sitter and review sitter guidelines with assigned personnel  7.  Initiate Psychosocial CNS and Spiritual Care consult, as indicated  9/19/2022 2341 by Donato Dejesus RN  Outcome: Progressing  9/19/2022 1413 by Clayton Dutton RN  Outcome: Progressing     Problem: Chronic Conditions and Co-morbidities  Goal: Patient's chronic conditions and co-morbidity symptoms are monitored and maintained or improved  9/19/2022 2341 by Donato Dejesus RN  Outcome: Progressing  Flowsheets (Taken 9/19/2022 2031)  Care Plan - Patient's Chronic Conditions and Co-Morbidity Symptoms are Monitored and Maintained or Improved:   Monitor and assess patient's chronic conditions and comorbid symptoms for stability, deterioration, or improvement   Collaborate with multidisciplinary team to address chronic and comorbid conditions and prevent exacerbation or deterioration   Update acute care plan with appropriate goals if chronic or comorbid symptoms are exacerbated and prevent overall improvement and discharge  9/19/2022 1413 by Clayton Dutton RN  Outcome: Progressing     Problem: Pain  Goal: Verbalizes/displays adequate comfort level or baseline comfort level  9/19/2022 2341 by Donato Dejesus RN  Outcome: Progressing  9/19/2022 1413 by Clayton Dutton RN  Outcome: Progressing

## 2022-09-20 NOTE — DISCHARGE INSTRUCTIONS
Follow up with Dr. Humberto Calderon in 1 week    BMP and CBC Friday, 9/23/22    Follow up with Cardiology in 1 week

## 2022-09-20 NOTE — DISCHARGE INSTR - DIET
Good nutrition is important when healing from an illness, injury, or surgery. Follow any nutrition recommendations given to you during your hospital stay. If you were given an oral nutrition supplement while in the hospital, continue to take this supplement at home. You can take it with meals, in-between meals, and/or before bedtime. These supplements can be purchased at most local grocery stores, pharmacies, and chain [employer])-stores. If you have any questions about your diet or nutrition, call the hospital and ask for the dietitian.   Regular diet

## 2022-09-20 NOTE — PROGRESS NOTES
Occupational Therapy  Facility/Department: [facility]  PROGRESSIVE CARE  Daily Treatment Note  NAME: Jill Rodriguez  : 1930  MRN: [id number]    Date of Service: 2022    Discharge Recommendations:  Continue to assess pending progress, Subacute/Skilled Nursing Facility, [facility] with OT         Patient Diagnosis(es): The encounter diagnosis was Acute on chronic congestive heart failure, unspecified heart failure type ([address] Assessment    Activity Tolerance: Patient tolerated treatment well;Patient limited by endurance; Patient limited by fatigue  Discharge Recommendations: Continue to assess pending progress;Subacute/Skilled Nursing Facility; [facility] with OT      Plan   Plan  Times per Week: 1-2  Current Treatment Recommendations: Self-Care / ADL; Functional mobility training;Cognitive reorientation; Safety education & training;Patient/Caregiver education & training;Equipment evaluation, education, & procurement       Subjective   Subjective  Subjective: Patient rec'd seated in recliner upon [place] arrival, agreeable and pleasant for OT treatment. Pain: Denies  Orientation  Overall Orientation Status: Impaired  Orientation Level: Oriented to person;Disoriented to situation;Disoriented to place; Disoriented to time  Cognition  Overall Cognitive Status: Exceptions  Arousal/Alertness: Delayed responses to stimuli  Following Commands: Follows one step commands with increased time; Follows one step commands with repetition  Attention Span: Difficulty attending to directions  Memory: Decreased recall of biographical Information;Decreased recall of recent events;Decreased short term memory;Decreased long term memory  Problem Solving: Assistance required to generate solutions  Insights: Decreased awareness of deficits  Initiation: Requires cues for some  Sequencing: Requires cues for some        Objective    Vitals  Vitals  SpO2: 94 %  O2 Device: Nasal cannula (2L O2)  Bed Mobility Training  Bed Mobility Training: No  Overall Level of Assistance: Contact-guard assistance;Minimum assistance  Supine to Sit: Contact-guard assistance  Scooting: Contact-guard assistance  Balance  Sitting: Intact  Standing: Intact  Transfer Training  Transfer Training: Yes  Overall Level of Assistance: Contact-guard assistance  Interventions: Tactile cues; Verbal cues; Safety awareness training  Sit to Stand: Contact-guard assistance  Stand to Sit: Contact-guard assistance  Stand Pivot Transfers: Minimum assistance;Contact-guard assistance  Bed to Chair: Minimum assistance;Contact-guard assistance  Toilet Transfer: Contact-guard assistance  Gait Training  Gait Training: Yes  Right Side Weight Bearing: Full  Left Side Weight Bearing: Full  Gait  Overall Level of Assistance: Contact-guard assistance  Interventions: Tactile cues; Verbal cues; Safety awareness training  Speed/Tasha: Shuffled; Slow  Step Length: Left shortened;Right shortened  Distance (ft): 20 Feet (to and from bathroom)  Assistive Device: Walker, rolling     ADL  Grooming: Contact guard assistance (performed brushing hair while standing at sink)  Toileting: Contact guard assistance        Safety Devices  Type of Devices: Telesitter in use;Call light within reach; Chair alarm in place;Gait belt;Left in chair     Patient Education  Education Given To: Patient  Education Provided: Transfer Training; Fall Prevention Strategies  Education Method: Verbal  Barriers to Learning: Cognition  Education Outcome: Unable to demonstrate understanding    Goals  Short Term Goals  Time Frame for Short term goals: Duration of hospital stay  Short Term Goal 1: Pt will complete toilet transfer and toileting tasks with a/e as needed with CGA to prepare for D/C - [facility]  Short Term Goal 2: Pt will complete grooming task while standing at sink with CGA to prepare for D/C - [facility]       Therapy Time   Individual Concurrent Group Co-treatment   Time In 1327         Time Out 1340         Minutes 13         Timed Code Treatment Minutes: [address], [place]

## 2022-09-20 NOTE — CARE COORDINATION
Attempted to contact [address] to inform him of second medicare IMM notice.  Taniya Patience out of office and will call me when he is back.

## 2022-09-20 NOTE — PROGRESS NOTES
Physical Therapy  Facility/Department: [facility]  PROGRESSIVE CARE  Daily Treatment Note  NAME: Pricila Lord  : 1930  MRN: [id number]    Date of Service: 2022    Discharge Recommendations:  [address] with PT, Continue to assess pending progress        Patient Diagnosis(es): The encounter diagnosis was Acute on chronic congestive heart failure, unspecified heart failure type ([address] Assessment   Activity Tolerance: Patient tolerated treatment well;Patient limited by endurance; Patient limited by fatigue     Plan    Plan  Current Treatment Recommendations: Strengthening;Balance training;ROM; Functional mobility training;Transfer training;Gait training     Restrictions  Restrictions/Precautions  Restrictions/Precautions: General Precautions     Subjective    Subjective  Subjective: Patient supine in bed agreeable to session. Relating she had to use the restroom. Orientation  Overall Orientation Status: Impaired  Orientation Level: Oriented to person;Disoriented to situation;Disoriented to place; Disoriented to time  Cognition  Overall Cognitive Status: Exceptions  Arousal/Alertness: Delayed responses to stimuli  Following Commands: Follows one step commands with increased time; Follows one step commands with repetition  Attention Span: Difficulty attending to directions  Memory: Decreased recall of biographical Information;Decreased recall of recent events;Decreased short term memory;Decreased long term memory     Objective   Vitals  O2 Device: Nasal cannula  Bed Mobility Training  Bed Mobility Training: Yes  Overall Level of Assistance: Contact-guard assistance;Minimum assistance  Supine to Sit: Contact-guard assistance  Scooting: Contact-guard assistance  Transfer Training  Transfer Training: Yes  Overall Level of Assistance: Contact-guard assistance;Minimum assistance  Sit to Stand: Minimum assistance;Contact-guard assistance  Stand to Sit: Minimum assistance;Contact-guard assistance  Stand Pivot Transfers: Minimum assistance;Contact-guard assistance  Bed to Chair: Minimum assistance;Contact-guard assistance  Toilet Transfer: Minimum assistance;Contact-guard assistance  Gait Training  Gait Training: Yes  Right Side Weight Bearing: Full  Left Side Weight Bearing: Full  Gait  Overall Level of Assistance: Contact-guard assistance;Minimum assistance  Speed/Tasha: Shuffled; Slow  Distance (ft): 25 Feet  Assistive Device: Walker, rolling  Neuromuscular Education  Neuromuscular Education: Yes  Functional Movement Patterns: Performed dynamic standing hygiene tasks at [address] [place] [address]. PT Exercises  Exercise Treatment: Pateint increasingly tired and fatigued after performing several transfers requesting to go back to bedside chair. Safety Devices  Type of Devices: Telesitter in use;Call light within reach; Chair alarm in place;Gait belt;Left in chair       Goals  Short Term Goals  Time Frame for Short term goals: LOS  Short term goal 1: bed mobiltiy with CGA  Short term goal 2: Transfers with CGA  Short term goal 3: Amb x 10ft with RW and CGA  Patient Goals   Patient goals : Return to facility    Education  Patient Education  Education Given To: Patient  Education Provided: Role of Therapy  Education Method: Verbal    Therapy Time   Individual Concurrent Group Co-treatment   Time In 0917         Time Out 0940         Minutes [place], [place]

## 2022-09-21 ENCOUNTER — TELEPHONE (OUTPATIENT)
Dept: FAMILY MEDICINE CLINIC | Age: 87
End: 2022-09-21

## 2022-09-21 NOTE — PROGRESS NOTES
Hospitalist Progress Note    Patient:  Michael Eckert     YOB: 1930    MRN: [id number]   Admit date: 9/15/2022     Acct: [de-identified]     PCP: Kwaku Teresa MD    CC--Interval History:       acute diastolic CHF--markedly improved    Hypoxia---stable on 2 liters O2 NC    Hypertensive urgency resolved    Type 2 NSTEMI----stable    To [facility]---9.20.2022    See note below     All other ROS negative except noted in HPI    Diet:  No diet orders on file    Medications:  Scheduled Meds:  Continuous Infusions:  PRN Meds:    Objective:  Labs:  CBC with Differential:    Lab Results   Component Value Date/Time    WBC 11.2 09/20/2022 05:15 AM    RBC 4.53 09/20/2022 05:15 AM    HGB 12.7 09/20/2022 05:15 AM    HCT 40.0 09/20/2022 05:15 AM     09/20/2022 05:15 AM    MCV 88.3 09/20/2022 05:15 AM    MCH 28.0 09/20/2022 05:15 AM    MCHC 31.8 09/20/2022 05:15 AM    RDW 14.5 09/20/2022 05:15 AM    LYMPHOPCT 7 09/20/2022 05:15 AM    MONOPCT 1 09/20/2022 05:15 AM    BASOPCT 0 09/20/2022 05:15 AM    MONOSABS 0.11 09/20/2022 05:15 AM    LYMPHSABS 0.78 09/20/2022 05:15 AM    EOSABS 0.00 09/20/2022 05:15 AM    BASOSABS 0.00 09/20/2022 05:15 AM    DIFFTYPE NOT REPORTED 05/09/2018 06:03 AM     BMP:    Lab Results   Component Value Date/Time     09/20/2022 05:15 AM    K 4.4 09/20/2022 05:15 AM    CL 91 09/20/2022 05:15 AM    CO2 40 09/20/2022 05:15 AM    BUN 27 09/20/2022 05:15 AM    LABALBU 4.3 09/15/2022 08:39 AM    CREATININE 0.63 09/20/2022 05:15 AM    CREATININE 0.7 05/14/2019 12:00 AM    CALCIUM 8.9 09/20/2022 05:15 AM    GFRAA >60 09/20/2022 05:15 AM    LABGLOM >60 09/20/2022 05:15 AM    GLUCOSE 134 09/20/2022 05:15 AM           Physical Exam:  Vitals: /63   Pulse 65   Temp 98.4 °F (36.9 °C) (Tympanic)   Resp 14   Ht 5' 4\" (1.626 m)   Wt 119 lb (54 kg)   SpO2 95%   BMI 20.43 kg/m²   24 hour intake/output:  Intake/Output Summary (Last 24 hours) at 9/20/2022 2134  Last data filed at 9/20/2022 1300  Gross per 24 hour   Intake 410 ml   Output 1150 ml   Net -740 ml     Last 3 weights: Wt Readings from Last 3 Encounters:   09/20/22 119 lb (54 kg)   08/04/22 135 lb (61.2 kg)   08/04/22 126 lb 9.6 oz (57.4 kg)     HEENT:  O2 NC---multiple missing teeth---, Normocephalic, and Atraumatic  Neck: Supple, No Masses, Tenderness, Nodularity, and No Lymphadenopathy  Chest/Lungs: Distant Breath Sounds  Cardiac: Regular Rate and Rhythm  GI/Abdomen: Bowel Sounds Present and Soft, Non-tender, without Guarding or Rebound Tenderness  : Not examined  EXT/Skin:  UNNA boots bilaterally and No Edema  Neuro:  alert---dementia = baseline---known gait-balance instability---generalized weakness      Assessment:    Principal Problem:    Acute on chronic systolic CHF (congestive heart failure) (HCC)  Active Problems:    Acute on chronic congestive heart failure (HCC)  Resolved Problems:    * No resolved hospital problems.  *    Aleksandra Headleyh.         ?       91  WF  [JANICE Barrett---[facility];  [place] [facility]---T[facility]]  DNR-CCA       HEPARIN continuous infusion    SUPPLEMENTAL OXYGEN---2 liters    UNNA BOOTS---BLE    Anti-infectives:   --------                                            CHF---acute diastolic----9.15.2022                                                    2D ECHO----9.16.2022---mild-to-moderate LVH--mildly reduced LVSF---                                                                     NRVSF---MAC--MV thickening--trivial MR--AV calcification----                                                                     adequate opening---mild TR---RVSP ~ 39 mm Hg--AR upper                                                                     limits normal 3.7 cm--IVC normal diameter and inspiratory collapse---                                                                     Grade II moderate DD---LVEF ~ 45%                                                    CTA chest--pulmonary--9.15.2022---no PE---patchy airspace                                                                   disease--lower lung fields--multifocal infection or aspiration                                                                   considerations-----up to 5.7 cm ascending thoracic aortic aneurysm                                                    Elevated d-dimer---9.15.2022                                                    ProBNP--9.15.2022----6055   Dyspnea---hypoxia---9.15.2022   Hypertensive urgency---9.15.2022   Elevated troponin----9.15.2022---59-61   Type 2 NSTEMI---9.15.2022             CXR---9.15.2022---scattered interstitial prominence---mildly increased at                                   lung bases likely chronic interstitial change with superposed                                    edema or infiltrate              EKG---9.15.2022---SR--93--LAE--LBBB---old inferior infarction              EKG---9.15.2022---sinus tachycardia---112---PACs--LAD--LBBB---old inferior infarction              EKG---8.4.2022----NSR---68---LAD--LVH--LBBB---old inferior infarction  Hyponatremia   Dysphagia              MBS--9.19.2022---normal----thin--regular      Prior:             2D ECHO---5.8.2018---mild concentric LVH--NLVSF--hyperdynamic LV--                        mild increased LVOT flow velocity---NRVSF---MAC--trivial MR--                        SOUMYA without stenosis--mild TR---RVSP ~ 40 mm Hg----normal AR                        3.1 cm---Grade I mild DD---LVEF > 65%            MI ruled out---5. 8.2018            EKG---5.7.2018---NSR--70---LAD----LBBB                      2D ECHO---2.17.2017--normal chamber sizes----NLVSF--                        NRVSF--MAC---trivial TR--SOUMYA--trivial AR----mild                         TR---RVSP ~ 29 mm Hg---Grade 1 DD----LVEF ~ 55%            Hypotension            Bradycardia   Hyponatremia     LBBB--see above   II/VI LISETH  Hypertension  Hyperlipidemia  COPD  CKD---Stage 2  Chronic mucopurulent bronchitis  Dementia---paranoia--delusions--phobic behavior  Gait-balance instability  Anemia----chronic  Anxiety  PMH:   tension headaches, dermatophytosis---toe nails, hyponatremia, substance                abuse---theophylline, Hypertensive urgency----2018, left sided chest pain---               facial numbness---aphasia----5. 7.2018, Vitamin D deficiency  PSH:    excision mass---LN right neck---age 13, T&A, UTI--- 2018--yeast, urinary               retention---cross--2018     Allergies:        clindamycin---rash, macrolides--all mycins--Zithromax--rash,                          penicillin-----rash, tetracyclines---rash  Intolerances:   Norvasc--amlodipine           Plan:      To [employer]---9.20.2022      Medications reviewed      Code status---DNR-CCA      O2 NC---adjust--goal---90-95%      UNNA boots BLE      Prednisone 40 daily x 4 days, then stop      Hyponatremia---\"salt tabs\"      CHF--Lasix 40 daily      Follow up Dr. Barbie Garcia,       See orders    Electronically signed by Lucy Denise MD on 9/20/2022 at 9:34 PM    Hospitalist

## 2022-09-22 NOTE — DISCHARGE SUMMARY
[address]                 [address] [place] [address], 00 [facility]                               DISCHARGE SUMMARY    PATIENT NAME: Tricia Cummings                   :        1930  MED REC NO:   [id number]                             ROOM:       1883  ACCOUNT NO:   [de-identified]                           ADMIT DATE: 09/15/2022  PROVIDER:     George Bernardo. Kylie Lee MD                  DISCHARGE DATE:  2022    ATTENDING PHYSICIAN OF HOSPITALIZATION:  Carol Jackson MD    PERSONAL PHYSICIAN:  Marco Esposito, Internal Medicine, [facility]. The patient is a resident of [address] [place] [address]. The patient has seen [facility], [facility]. DIAGNOSES:  1. Congestive heart failure, acute diastolic, . A 2D echo,  2022, mild-to-moderate LVH, mildly reduced left ventricular  systolic function, normal right ventricular systolic function, MAC, MV  thickening, trivial MR, AV calcification, adequate opening, mild TR,  RVSP 39 mmHg, AR upper limits of normal 3.7 cm, IVC normal diameter and  inspiratory collapse, grade 2 moderate diastolic dysfunction, LVEF 45%. 2.  CTA chest pulmonary, 09/15/2022, no pulmonary embolus, patchy  airspace disease, lower lung fields, multifocal infection versus  aspiration, consideration up to 5.7 cm ascending thoracic aortic  aneurysm. 3.  Elevated D-dimer, 09/15/2022, see CTA above. 4.  ProBNP, 09/15/2022, 6055.  5.  Dyspnea, hypoxia, 09/15/2022, supplemental oxygen required. 6.  Hypertensive urgency, 09/15/2022.  7.  Elevated troponin, 09/15/2022.  8.  Type 2 non-ST-elevation MI, 09/15/2022. EKG, 09/15/2022, sinus  rhythm, rate 93, LAE, left bundle-branch block and an old inferior  infarction. Similar findings on other EKGs. 9.  Hyponatremia, corrected. 10.  Dysphagia.   Modified barium swallow, however, actual normal with  thin liquids and regular consistency foods.  11.  Left bundle-branch block, see above. 12.  Grade 2/6 systolic ejection murmur. 13.  Hypertension. 14.  Hyperlipidemia. 15.  COPD. 16.  Chronic kidney disease, stage II, stable. 17.  Chronic mucopurulent bronchitis. 25.  Dementia with known history of paranoia, delusions and phobic  behavior. 19.  Gait and balance instability. 20.  Chronic anemia. 21. Anxiety. Other medical problems set forth in the progress note of 09/20/2022,  incorporated for reference herein. HISTORY OF PRESENT ILLNESS AND HOSPITAL COURSE:  The patient is a  41-year-old white female, patient of Madi Garner, Internal  Medicine. The patient is a resident of [address] [place] [address]. T    he patient  presented ultimately with the issues set forth above. Congestive heart  failure, treated with diuretics. hypertensive urgency,  blood pressure medications adjusted, was seen by Cardiology, deemed to  be a type 2 injury with elevation of troponins only without other  findings. Hyponatremia and hypokalemia, for which  replacement therapies given. The patient's sodium 135 and potassium 4.4  around the time of discharge. The patient's examination has what is a chronic forced wheezing when she  takes deep breaths. LABORATORY DATA:  Around the time of discharge, white cell count 11.2;  hemoglobin 12.7; hematocrit 40.0; and platelets 450,868. Sodium 135,  potassium 4.4, chloride 91, CO2 of 40, BUN 24, creatinine 0.63, glucose  134, calcium is 8.9 and GFR greater than 60. DISCHARGE INSTRUCTIONS/FOLLOWUP:  Discharged to [address] [place] [address] on 09/20/2022. DIET:  Regular. ACTIVITY:  As tolerated with physical and occupational therapies  continued from the hospital setting. CONDITION AT DISCHARGE:  Fair, improved. The patient is to ambulate with a walker with fall precautions. CBC, basic metabolic panel will be drawn on 09/23/2022. Code status now is DNRCC-Arrest per the patient's guardian.     Supplemental oxygen, 2 liters, may adjust to keep oxygen saturation,  goal range of 90% to 95%. Lower extremities scaling, mild inflammation, Unna boots bilaterally, to  be changed on a daily basis. FOLLOWING MEDICATIONS, NEW:  Lasix (furosemide) 40 mg p.o. daily;  prednisone 20 mg tablets, 40 mg p.o. daily 4 days, then stop; sodium  chloride 1 gm p.o. twice daily with meals. FOLLOWING MEDICATIONS FOR WHICH CHANGES MAY HAVE OCCURRED:   Acetaminophen (Tylenol) 500 mg p.o. q.6 hours p.r.n. pain; isosorbide  mononitrate (Imdur) 60 mg p.o. daily; metoprolol tartrate (Lopressor) 50  mg p.o. b.i.d.; vitamin D3 25 mcg (1000 units) to take 4000 units p.o.  daily. FOLLOWING MEDICATIONS CONTINUED WITHOUT CHANGE:  Acetylcysteine  (Nutrient) 600 mg tablets, 2 tablets in the morning and bedtime;  albuterol 0.083% nebulizer twice daily; aluminum/magnesium  hydroxide/simethicone 200/200/20, 30 mL every 6 hours p.r.n.  indigestion; aspirin 81 mg p.o. daily; Tessalon (benzonatate) 200 mg  p.o. three times a day p.r.n. cough; bethanechol (Urecholine) 25 mg four  times a day; Symbicort (budesonide/formoterol) 80/4.5, two puffs twice  daily; buspirone (BuSpar) 7.5 mg p.o. nightly and one p.o. q.a.m.;  calcium 600 mg twice daily; Sarna (camphor/menthol) 0.5/0.5% lotion,  topically, affected areas at bedtime; diphenhydramine (Benadryl) 25 mg  p.o. q.6 hours p.r.n. itching; donepezil (Aricept) 10 mg daily;  Flonase  (fluticasone), 1 spray each nostril daily; guaifenesin (Robitussin) 200  mg four times daily p.r.n. cough, congestion; hydroxyzine (Vistaril) 25  mg nightly p.r.n. anxiety; levothyroxine (Synthroid) 50 mcg (0.05 mg)  p.o. daily; lisinopril 5 mg p.o. daily; loperamide (Imodium) 2 mg three  times a day p.r.n. diarrhea; loratadine (Claritin) 10 mg p.o. daily;  Magic Mouthwash four times daily as needed for mouth pain, discomfort;  mirtazapine (Remeron) 7.5 mg p.o. nightly; multivitamin one p.o. daily;  nitroglycerin 0.4 mg sublingual q.5 minutes x3 p.r.n. chest pain;  supplemental oxygen as set forth above; paroxetine (Paxil) 10 mg p.o.  nightly; potassium chloride 20 mEq p.o. b.i.d.; Mylicon (simethicone)  125 mg chewable tablet every 6 hours p.r.n. gas, flatulence. Follow up with the patient's personal physician, Octavio Nova,  Internal Medicine, [facility]. Any aspect of the patient's care not discussed in the chart and/or  dictation will be addressed and treated as an outpatient. The patient's medications have been reviewed including, but not limited  to, pre-hospital, hospital and discharge medications. The patient  and/or the patient's personal representatives were specifically advised  the only medications to be taken are those set forth in the discharge  orders and no other medications should be taken. Any prior medications  not on the discharge orders are specifically discontinued.         Greta Torres MD    D: 09/21/2022 8:53:53       T: 09/21/2022 8:58:18     [email or web address]  Job#: [id number]     Doc#: [id number]    CC:

## 2022-09-27 ENCOUNTER — OUTSIDE SERVICES (OUTPATIENT)
Dept: FAMILY MEDICINE CLINIC | Age: 87
End: 2022-09-27
Payer: MEDICARE

## 2022-09-27 DIAGNOSIS — J41.1 MUCOPURULENT CHRONIC BRONCHITIS (HCC): Primary | ICD-10-CM

## 2022-09-27 DIAGNOSIS — I10 ESSENTIAL HYPERTENSION: ICD-10-CM

## 2022-09-27 DIAGNOSIS — G30.1 LATE ONSET ALZHEIMER'S DISEASE WITHOUT BEHAVIORAL DISTURBANCE (HCC): ICD-10-CM

## 2022-09-27 DIAGNOSIS — F22 PARANOIA (HCC): ICD-10-CM

## 2022-09-27 DIAGNOSIS — I71.20 THORACIC AORTIC ANEURYSM WITHOUT RUPTURE: ICD-10-CM

## 2022-09-27 DIAGNOSIS — F02.80 LATE ONSET ALZHEIMER'S DISEASE WITHOUT BEHAVIORAL DISTURBANCE (HCC): ICD-10-CM

## 2022-09-27 PROCEDURE — 99309 SBSQ NF CARE MODERATE MDM 30: CPT | Performed by: FAMILY MEDICINE

## 2022-10-04 ASSESSMENT — ENCOUNTER SYMPTOMS
DIARRHEA: 0
COUGH: 0
CONSTIPATION: 0
WHEEZING: 0
SHORTNESS OF BREATH: 1
ABDOMINAL PAIN: 0
CHEST TIGHTNESS: 0

## 2022-10-04 NOTE — PROGRESS NOTES
[address]  [address] [facility] ([place], [place]) [address]  Dept: [phone]  Dept Fax: [phone]  Loc: [phone]    Carly Cook  is a 80 y.o. female who presents today for her medical conditions/complaints as noted below. Carly Cook is c/o of     Chief Complaint   Patient presents with    Anxiety    Dementia    COPD    Sinus Problem       HPI:   Boni Kaur is being seen for her regular monthly follow up while at [facility]. Overall, patient has been doing about the same. Nursing staff reports that she is continuing to be paranoid that people are stealing from her, and she tends to have a lot of complaints about nursing staff. Apparently, she does not think that I know anything about her lungs and so she is not sure if she wants me as her doctor. Interestingly enough though when I see her as a patient, she is always very sweet, has never complained, asks about my family, wants to know how I am doing, and has never indicated to me that she does not want to see me. This is clearly a difference in time of day and her general overall dementia. Patient reports that she continues to be short of breath, but she has not had any problems with cough recently. She also reports, that overall she has been feeling pretty good. Patient has not had any issues with new or worsening headaches. She continues to have a mild chronic daily headache. No recent issues with sinus congestion or pressure, and she has been doing well on her inhalers and Flonase. Patient is still eating well. No problems with her appetite. No issues with diarrhea. She continues to have this general itching of her entire body and you can tell there is still a lot of excoriated areas, but I do think that it looks a little bit better. Her legs are wrapped, and she is seen wound care for them, because they were severely excoriated with sloughing skin.  No issues with fevers. No recent signs of cellulitis. 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 TC, RRT 4/20/18   Substance Use Topics    Alcohol use: No     Alcohol/week: 0.0 standard drinks      Prior to Visit Medications    Medication Sig Taking?  Authorizing Provider   isosorbide mononitrate (IMDUR) 60 MG extended release tablet Take 1 tablet by mouth daily  Debra Santiago MD   metoprolol tartrate (LOPRESSOR) 50 MG tablet Take 1 tablet by mouth 2 times daily  Debra Santiago MD   furosemide (LASIX) 40 MG tablet Take 1 tablet by mouth daily  Debra Santiago MD   sodium chloride 1 g tablet Take 1 tablet by mouth 2 times daily (with meals)  Debra Santiago MD   busPIRone (BUSPAR) 7.5 MG tablet Take 1 tablet by mouth nightly  Debra Santiago MD   busPIRone (BUSPAR) 10 MG tablet Take 1 tablet by mouth every morning  Debra Santiago MD   hydrOXYzine pamoate (VISTARIL) 25 MG capsule Take 1 capsule by mouth nightly as needed for Anxiety  Debra Santiago MD   mirtazapine (REMERON) 7.5 MG tablet Take 1 tablet by mouth nightly  Debra Santiago MD   PARoxetine (PAXIL) 10 MG tablet Take 1 tablet by mouth nightly  Debra Santiago MD   acetaminophen (TYLENOL) 500 MG tablet Take 500 mg by mouth every 6 hours as needed for Pain  Historical Provider, MD   loperamide (IMODIUM) 2 MG capsule Take 2 mg by mouth 3 times daily as needed for Diarrhea  Historical Provider, MD   Acetylcysteine, Nutrient, 600 MG TABS Take 2 tablets by mouth in the morning and at bedtime Indications: Neurotic Excoriation  Historical Provider, MD   vitamin D3 (CHOLECALCIFEROL) 25 MCG (1000 UT) TABS tablet Take 4,000 Units by mouth daily Indications: Supplement  Historical Provider, MD   diphenhydrAMINE (BENADRYL) 25 MG tablet Take 25 mg by mouth every 6 hours as needed for Itching  Historical Provider, MD   Magic Mouthwash (MIRACLE MOUTHWASH) Swish and spit 10 mLs 4 times daily as needed (MOUTH PAIN) 1:1 Lidocaine,Nystatin,Diphenhydramine swish and spit.   Historical Provider, MD   camphor-menthol (SARNA) 0.5-0.5 % lotion Apply to effected areas topically at bedtime for itching  Historical Provider, MD   albuterol (PROVENTIL) (2.5 MG/3ML) 0.083% nebulizer solution Take 2.5 mg by nebulization 2 times daily  Historical Provider, MD   benzonatate (TESSALON) 200 MG capsule Take 200 mg by mouth 3 times daily as needed for Cough  Historical Provider, MD   guaiFENesin (ROBITUSSIN) 100 MG/5ML syrup Take 10 mLs by mouth 4 times daily as needed for Cough or Congestion  Historical Provider, MD   lisinopril (PRINIVIL;ZESTRIL) 5 MG tablet Take 1 tablet by mouth daily  Nick Ferguson MD   bethanechol (URECHOLINE) 25 MG tablet Take 1 tablet by mouth 4 times daily  Nick Ferguson MD   Multiple Vitamin (MULTI VITAMIN DAILY) TABS Take 1 tablet by mouth daily Take 1 tab po daily  Abdirizak Dose, MD   fluticasone (FLONASE) 50 MCG/ACT nasal spray 1 spray by Nasal route daily  Abdirizak Dose, MD   hydrochlorothiazide (MICROZIDE) 12.5 MG capsule take 1 capsule by mouth once daily  Abdirizak Dose, MD   Calcium Carbonate (CALCIUM 600 PO) Take 600 mg by mouth 2 times daily  Historical Provider, MD   loratadine (CLARITIN) 10 MG capsule Take 1 capsule by mouth daily  Jeremiah Santos MD   potassium chloride (KLOR-CON M) 20 MEQ extended release tablet Take 1 tablet by mouth 2 times daily  Jeremiah Santos MD   aspirin 81 MG chewable tablet Take 1 tablet by mouth daily  Jeremiah Santos MD   budesonide-formoterol (SYMBICORT) 80-4.5 MCG/ACT AERO Inhale 2 puffs into the lungs 2 times daily  Alex Inman MD   nitroGLYCERIN (NITROSTAT) 0.4 MG SL tablet Place 1 tablet under the tongue every 5 minutes as needed for Chest pain  Jameson Saha MD   aluminum & magnesium hydroxide-simethicone (MAALOX REGULAR STRENGTH) 200-200-20 MG/5ML SUSP suspension Take 30 mLs by mouth every 6 hours as needed for Diannah Moulding, MD   levothyroxine (SYNTHROID) 50 MCG tablet Take 1 tablet by mouth Daily  Jeremiah Santos MD   donepezil (ARICEPT) 10 MG tablet Take 10 mg by mouth daily  Historical Provider, MD   simethicone (MYLICON) 503 MG chewable tablet Take 125 mg by mouth every 6 hours as needed for Flatulence  Historical Provider, MD   OXYGEN Inhale 2 L/min into the lungs nightly. Historical Provider, MD     Allergies   Allergen Reactions    Amlodipine Other (See Comments)     Pt says she got all the side effects      Sulfa Antibiotics     Cleocin [Clindamycin] Rash    Macrolides And Ketolides Rash     \"All mycins\"    Pcn [Penicillins] Rash    Tetracyclines & Related Rash    Zithromax [Azithromycin] Rash       Subjective:      Review of Systems   Constitutional:  Negative for activity change, appetite change, chills, fatigue and fever. Respiratory:  Positive for shortness of breath. Negative for cough, chest tightness and wheezing. Cardiovascular:  Negative for chest pain, palpitations and leg swelling. Gastrointestinal:  Negative for abdominal pain, constipation and diarrhea. Genitourinary:  Negative for difficulty urinating. Skin:  Negative for rash. Neurological:  Positive for headaches.  Negative for dizziness, syncope, weakness and light-headedness. Psychiatric/Behavioral:  Positive for confusion and decreased concentration. Negative for behavioral problems, dysphoric mood and sleep disturbance. The patient is not nervous/anxious. Objective:     Physical Exam  Vitals and nursing note reviewed. Constitutional:       General: She is not in acute distress. Appearance: She is well-developed. Eyes:      Conjunctiva/sclera: Conjunctivae normal.   Neck:      Thyroid: No thyromegaly. Cardiovascular:      Rate and Rhythm: Normal rate and regular rhythm. Heart sounds: Normal heart sounds. No murmur heard. Pulmonary:      Effort: Pulmonary effort is normal. No respiratory distress. Breath sounds: Normal breath sounds. No wheezing. Abdominal:      General: Abdomen is flat. Bowel sounds are normal.      Palpations: Abdomen is soft. Tenderness: There is no abdominal tenderness. There is no guarding or rebound. Musculoskeletal:      Cervical back: Normal range of motion and neck supple. Lymphadenopathy:      Cervical: No cervical adenopathy. Skin:     General: Skin is warm and dry. Findings: No erythema or rash. Neurological:      General: No focal deficit present. Mental Status: She is alert. Mental status is at baseline. Psychiatric:         Mood and Affect: Mood normal.         Speech: Speech normal.         Behavior: Behavior normal.         Cognition and Memory: Memory is impaired. Assessment:       Diagnosis Orders   1. Mucopurulent chronic bronchitis ([address]        2. Paranoia ([address]        3. Essential hypertension        4. Late onset Alzheimer's disease without behavioral disturbance ([address]        5. Dermatitis                  Plan:   COPD. Stable. She continues to be mildly short of breath, but no recent issues with cough. No signs of COPD exacerbation. I will continue to monitor. Paranoia. Stable.  Patient continues to have rants where she feels like people are stealing from her or that I am incompetent, which is a new rant of hers, but overall nothing has changed significantly. I will continue to monitor. Hypertension. Stable. Blood pressure has been well controlled therefore I will continue her current medications. Dementia. Stable. She continues to have periods of acute confusion and generally seeming very distraught and agitated, but overall no new problems, no new issues with wandering or behavioral concerns, and she is not causing any harm to herself or others. Dermatitis. Stable. She is continuing to see the wound doctor for the wounds on her legs. Whatever the most recent cream is that she has been using does seem to be helping some with her arms as there is much less excoriation than there was previously. Return in about 1 month (around 9/30/2022) for COPD follow up. Patient given educational materials - see patient instructions. Discussed use, benefit, and side effects of prescribed medications. All patient questions answered. Patient voiced understanding. Reviewed health maintenance. Instructed to continue current medications, diet and exercise. Patient agreed with treatment plan. Follow up as directed. Erich De Los Santos am personally transcribing for Mel Vila MD 10/4/22 at 11:29 AM EDT. Rebecca Kim MD, personally performed the services described in this document as transcribed by the , and it is both accurate and complete.     Electronically signed by Mel Vila MD on 10/4/22 at 12:59 PM EDT

## 2022-10-10 ENCOUNTER — HOSPITAL ENCOUNTER (EMERGENCY)
Age: 87
Discharge: NURSING, REHAB OR LONG-TERM CARE | End: 2022-10-10
Attending: EMERGENCY MEDICINE
Payer: MEDICARE

## 2022-10-10 ENCOUNTER — APPOINTMENT (OUTPATIENT)
Dept: CT IMAGING | Age: 87
End: 2022-10-10
Payer: MEDICARE

## 2022-10-10 ENCOUNTER — APPOINTMENT (OUTPATIENT)
Dept: GENERAL RADIOLOGY | Age: 87
End: 2022-10-10
Payer: MEDICARE

## 2022-10-10 ENCOUNTER — TELEPHONE (OUTPATIENT)
Dept: OTHER | Facility: CLINIC | Age: 87
End: 2022-10-10

## 2022-10-10 VITALS
RESPIRATION RATE: 22 BRPM | DIASTOLIC BLOOD PRESSURE: 45 MMHG | TEMPERATURE: 96.3 F | OXYGEN SATURATION: 90 % | HEART RATE: 59 BPM | SYSTOLIC BLOOD PRESSURE: 131 MMHG

## 2022-10-10 DIAGNOSIS — N39.0 URINARY TRACT INFECTION WITHOUT HEMATURIA, SITE UNSPECIFIED: ICD-10-CM

## 2022-10-10 DIAGNOSIS — R55 NEAR SYNCOPE: Primary | ICD-10-CM

## 2022-10-10 LAB
ABSOLUTE EOS #: 0.39 K/UL (ref 0–0.44)
ABSOLUTE IMMATURE GRANULOCYTE: 0.08 K/UL (ref 0–0.3)
ABSOLUTE LYMPH #: 1.21 K/UL (ref 1.1–3.7)
ABSOLUTE MONO #: 0.62 K/UL (ref 0.1–1.2)
ALBUMIN SERPL-MCNC: 3.1 G/DL (ref 3.5–5.2)
ALBUMIN/GLOBULIN RATIO: 1 (ref 1–2.5)
ALP BLD-CCNC: 65 U/L (ref 35–104)
ALT SERPL-CCNC: 12 U/L (ref 5–33)
ANION GAP SERPL CALCULATED.3IONS-SCNC: 6 MMOL/L (ref 9–17)
AST SERPL-CCNC: 16 U/L
BACTERIA: ABNORMAL
BASOPHILS # BLD: 1 % (ref 0–2)
BASOPHILS ABSOLUTE: 0.05 K/UL (ref 0–0.2)
BILIRUB SERPL-MCNC: 0.3 MG/DL (ref 0.3–1.2)
BILIRUBIN URINE: NEGATIVE
BUN BLDV-MCNC: 11 MG/DL (ref 8–23)
BUN/CREAT BLD: 15 (ref 9–20)
CALCIUM SERPL-MCNC: 9.7 MG/DL (ref 8.6–10.4)
CHLORIDE BLD-SCNC: 101 MMOL/L (ref 98–107)
CO2: 30 MMOL/L (ref 20–31)
CREAT SERPL-MCNC: 0.72 MG/DL (ref 0.5–0.9)
EKG ATRIAL RATE: 54 BPM
EKG P AXIS: 36 DEGREES
EKG P-R INTERVAL: 172 MS
EKG Q-T INTERVAL: 536 MS
EKG QRS DURATION: 120 MS
EKG QTC CALCULATION (BAZETT): 508 MS
EKG R AXIS: -56 DEGREES
EKG T AXIS: 105 DEGREES
EKG VENTRICULAR RATE: 54 BPM
EOSINOPHILS RELATIVE PERCENT: 4 % (ref 1–4)
EPITHELIAL CELLS UA: ABNORMAL /HPF (ref 0–5)
GFR SERPL CREATININE-BSD FRML MDRD: >60 ML/MIN/1.73M2
GLUCOSE BLD-MCNC: 100 MG/DL (ref 70–99)
GLUCOSE URINE: NEGATIVE
HCT VFR BLD CALC: 39.1 % (ref 36.3–47.1)
HEMOGLOBIN: 12.4 G/DL (ref 11.9–15.1)
IMMATURE GRANULOCYTES: 1 %
KETONES, URINE: NEGATIVE
LEUKOCYTE ESTERASE, URINE: ABNORMAL
LYMPHOCYTES # BLD: 13 % (ref 24–43)
MCH RBC QN AUTO: 28 PG (ref 25.2–33.5)
MCHC RBC AUTO-ENTMCNC: 31.7 G/DL (ref 25.2–33.5)
MCV RBC AUTO: 88.3 FL (ref 82.6–102.9)
MONOCYTES # BLD: 7 % (ref 3–12)
NITRITE, URINE: NEGATIVE
NRBC AUTOMATED: 0 PER 100 WBC
OTHER OBSERVATIONS UA: ABNORMAL
PDW BLD-RTO: 15.1 % (ref 11.8–14.4)
PH UA: 7 (ref 5–6)
PLATELET # BLD: 353 K/UL (ref 138–453)
PMV BLD AUTO: 8.8 FL (ref 8.1–13.5)
POTASSIUM SERPL-SCNC: 4 MMOL/L (ref 3.7–5.3)
PRO-BNP: 1329 PG/ML
PROTEIN UA: NEGATIVE
RBC # BLD: 4.43 M/UL (ref 3.95–5.11)
RBC # BLD: ABNORMAL 10*6/UL
RBC UA: ABNORMAL /HPF (ref 0–4)
SEG NEUTROPHILS: 74 % (ref 36–65)
SEGMENTED NEUTROPHILS ABSOLUTE COUNT: 6.87 K/UL (ref 1.5–8.1)
SODIUM BLD-SCNC: 137 MMOL/L (ref 135–144)
SPECIFIC GRAVITY UA: 1.01 (ref 1.01–1.02)
TOTAL PROTEIN: 6.1 G/DL (ref 6.4–8.3)
TROPONIN, HIGH SENSITIVITY: 23 NG/L (ref 0–14)
URINE HGB: NEGATIVE
UROBILINOGEN, URINE: NORMAL
WBC # BLD: 9.2 K/UL (ref 3.5–11.3)
WBC UA: ABNORMAL /HPF (ref 0–4)

## 2022-10-10 PROCEDURE — 71045 X-RAY EXAM CHEST 1 VIEW: CPT

## 2022-10-10 PROCEDURE — 85025 COMPLETE CBC W/AUTO DIFF WBC: CPT

## 2022-10-10 PROCEDURE — 99285 EMERGENCY DEPT VISIT HI MDM: CPT

## 2022-10-10 PROCEDURE — 86403 PARTICLE AGGLUT ANTBDY SCRN: CPT

## 2022-10-10 PROCEDURE — 70450 CT HEAD/BRAIN W/O DYE: CPT

## 2022-10-10 PROCEDURE — 87086 URINE CULTURE/COLONY COUNT: CPT

## 2022-10-10 PROCEDURE — 83880 ASSAY OF NATRIURETIC PEPTIDE: CPT

## 2022-10-10 PROCEDURE — 81001 URINALYSIS AUTO W/SCOPE: CPT

## 2022-10-10 PROCEDURE — 6370000000 HC RX 637 (ALT 250 FOR IP): Performed by: EMERGENCY MEDICINE

## 2022-10-10 PROCEDURE — 84484 ASSAY OF TROPONIN QUANT: CPT

## 2022-10-10 PROCEDURE — 93005 ELECTROCARDIOGRAM TRACING: CPT | Performed by: EMERGENCY MEDICINE

## 2022-10-10 PROCEDURE — 80053 COMPREHEN METABOLIC PANEL: CPT

## 2022-10-10 PROCEDURE — 2580000003 HC RX 258: Performed by: EMERGENCY MEDICINE

## 2022-10-10 PROCEDURE — 36415 COLL VENOUS BLD VENIPUNCTURE: CPT

## 2022-10-10 RX ORDER — NITROFURANTOIN 25; 75 MG/1; MG/1
100 CAPSULE ORAL 2 TIMES DAILY
Qty: 14 CAPSULE | Refills: 0 | Status: SHIPPED | OUTPATIENT
Start: 2022-10-10 | End: 2022-10-10 | Stop reason: SDUPTHER

## 2022-10-10 RX ORDER — NITROFURANTOIN 25; 75 MG/1; MG/1
100 CAPSULE ORAL 2 TIMES DAILY
Qty: 14 CAPSULE | Refills: 0 | Status: SHIPPED | OUTPATIENT
Start: 2022-10-10 | End: 2022-10-14

## 2022-10-10 RX ORDER — NITROFURANTOIN 25; 75 MG/1; MG/1
100 CAPSULE ORAL ONCE
Status: COMPLETED | OUTPATIENT
Start: 2022-10-10 | End: 2022-10-10

## 2022-10-10 RX ORDER — 0.9 % SODIUM CHLORIDE 0.9 %
500 INTRAVENOUS SOLUTION INTRAVENOUS ONCE
Status: COMPLETED | OUTPATIENT
Start: 2022-10-10 | End: 2022-10-10

## 2022-10-10 RX ADMIN — SODIUM CHLORIDE 500 ML: 9 INJECTION, SOLUTION INTRAVENOUS at 09:57

## 2022-10-10 RX ADMIN — NITROFURANTOIN MONOHYDRATE/MACROCRYSTALS 100 MG: 75; 25 CAPSULE ORAL at 11:58

## 2022-10-10 ASSESSMENT — PAIN SCALES - GENERAL: PAINLEVEL_OUTOF10: 8

## 2022-10-10 ASSESSMENT — PAIN DESCRIPTION - ORIENTATION: ORIENTATION: POSTERIOR

## 2022-10-10 ASSESSMENT — PAIN DESCRIPTION - LOCATION: LOCATION: HEAD

## 2022-10-10 ASSESSMENT — PAIN DESCRIPTION - FREQUENCY: FREQUENCY: CONTINUOUS

## 2022-10-10 NOTE — TELEPHONE ENCOUNTER
Writer contacted Dr. Mike Wise to inform of 30 day readmission risk. No Decision on disposition at this time.  Potential d/c    Call Back: If you need to call back to inform of disposition you can contact me at [phone]

## 2022-10-10 NOTE — ED PROVIDER NOTES
Refills: 0      !! busPIRone (BUSPAR) 7.5 MG tablet Take 1 tablet by mouth nightly  Qty: 30 tablet, Refills: 0      !! busPIRone (BUSPAR) 10 MG tablet Take 1 tablet by mouth every morning  Qty: 30 tablet, Refills: 0      hydrOXYzine pamoate (VISTARIL) 25 MG capsule Take 1 capsule by mouth nightly as needed for Anxiety  Qty: 30 capsule, Refills: 0      mirtazapine (REMERON) 7.5 MG tablet Take 1 tablet by mouth nightly  Qty: 30 tablet, Refills: 0      PARoxetine (PAXIL) 10 MG tablet Take 1 tablet by mouth nightly  Qty: 30 tablet, Refills: 0      acetaminophen (TYLENOL) 500 MG tablet Take 500 mg by mouth every 6 hours as needed for Pain      loperamide (IMODIUM) 2 MG capsule Take 2 mg by mouth 3 times daily as needed for Diarrhea      Acetylcysteine, Nutrient, 600 MG TABS Take 2 tablets by mouth in the morning and at bedtime Indications: Neurotic Excoriation      vitamin D3 (CHOLECALCIFEROL) 25 MCG (1000 UT) TABS tablet Take 4,000 Units by mouth daily Indications: Supplement      diphenhydrAMINE (BENADRYL) 25 MG tablet Take 25 mg by mouth every 6 hours as needed for Itching      Magic Mouthwash (MIRACLE MOUTHWASH) Swish and spit 10 mLs 4 times daily as needed (MOUTH PAIN) 1:1 Lidocaine,Nystatin,Diphenhydramine swish and spit.       camphor-menthol (SARNA) 0.5-0.5 % lotion Apply to effected areas topically at bedtime for itching      albuterol (PROVENTIL) (2.5 MG/3ML) 0.083% nebulizer solution Take 2.5 mg by nebulization 2 times daily      benzonatate (TESSALON) 200 MG capsule Take 200 mg by mouth 3 times daily as needed for Cough      guaiFENesin (ROBITUSSIN) 100 MG/5ML syrup Take 10 mLs by mouth 4 times daily as needed for Cough or Congestion      lisinopril (PRINIVIL;ZESTRIL) 5 MG tablet Take 1 tablet by mouth daily  Qty: 30 tablet, Refills: 0      bethanechol (URECHOLINE) 25 MG tablet Take 1 tablet by mouth 4 times daily  Qty: 90 tablet, Refills: 0      Multiple Vitamin (MULTI VITAMIN DAILY) TABS Take 1 tablet by mouth daily Take 1 tab po daily  Qty: 30 tablet, Refills: 11      fluticasone (FLONASE) 50 MCG/ACT nasal spray 1 spray by Nasal route daily  Qty: 1 Bottle, Refills: 11    Associated Diagnoses: Other allergic rhinitis      Calcium Carbonate (CALCIUM 600 PO) Take 600 mg by mouth 2 times daily      loratadine (CLARITIN) 10 MG capsule Take 1 capsule by mouth daily  Qty: 30 capsule, Refills: 11      potassium chloride (KLOR-CON M) 20 MEQ extended release tablet Take 1 tablet by mouth 2 times daily  Qty: 60 tablet, Refills: 11      aspirin 81 MG chewable tablet Take 1 tablet by mouth daily  Qty: 30 tablet, Refills: 11      budesonide-formoterol (SYMBICORT) 80-4.5 MCG/ACT AERO Inhale 2 puffs into the lungs 2 times daily  Qty: 1 Inhaler, Refills: 11    Associated Diagnoses: Small airways disease      nitroGLYCERIN (NITROSTAT) 0.4 MG SL tablet Place 1 tablet under the tongue every 5 minutes as needed for Chest pain  Qty: 25 tablet, Refills: 3      aluminum & magnesium hydroxide-simethicone (MAALOX REGULAR STRENGTH) 200-200-20 MG/5ML SUSP suspension Take 30 mLs by mouth every 6 hours as needed for Indigestion  Qty: 1 Bottle, Refills: 5      levothyroxine (SYNTHROID) 50 MCG tablet Take 1 tablet by mouth Daily  Qty: 30 tablet, Refills: 11      donepezil (ARICEPT) 10 MG tablet Take 10 mg by mouth daily      simethicone (MYLICON) 804 MG chewable tablet Take 125 mg by mouth every 6 hours as needed for Flatulence      OXYGEN Inhale 2 L/min into the lungs nightly. !! - Potential duplicate medications found. Please discuss with provider. ALLERGIES     is allergic to amlodipine, sulfa antibiotics, cleocin [clindamycin], macrolides and ketolides, pcn [penicillins], tetracyclines & related, and zithromax [azithromycin]. FAMILY HISTORY     She indicated that her mother is . She indicated that her father is . She indicated that her sister is alive. She indicated that her brother is alive.  She indicated that her maternal grandmother is . She indicated that her maternal grandfather is . She indicated that her paternal grandmother is . She indicated that her paternal grandfather is . family history includes Diabetes in her brother; High Blood Pressure in her sister. SOCIAL HISTORY      reports that she has never smoked. She has never used smokeless tobacco. She reports that she does not drink alcohol and does not use drugs. PHYSICAL EXAM     INITIAL VITALS:  tympanic temperature is 96.3 °F (35.7 °C) (abnormal). Her blood pressure is 112/33 (abnormal) and her pulse is 54. Her respiration is 18 and oxygen saturation is 90%. The patient is alert and oriented to baseline, in no apparent distress. HEENT is atraumatic. Pupils are PERRL at 4 mm with normal extraocular motion. Mucous membranes moist.    Neck is supple with no lymphadenopathy. No JVD. Heart sounds regular rate and rhythm with no gallops, murmurs, or rubs. Lungs clear, no wheezes, rales or rhonchi. Abdomen: soft, nontender with no pain to palpation. Musculoskeletal exam shows no evidence of trauma. Normal distal pulses in all extremities. Skin: no rash or edema. Neurological exam reveals cranial nerves 2 through 12 grossly intact. Patient has equal  and normal deep tendon reflexes. Psychiatric: Unable to assess due to underlying dementia. Lymphatics.:  No lymphadenopathy. DIFFERENTIAL DIAGNOSIS/ MDM:     CHF, bradycardia, hypotension, ACS, CVA    DIAGNOSTIC RESULTS     EKG: All EKG's are interpreted by the Emergency Department Physician who either signs or Co-signs this chart in the absence of a cardiologist.    Sinus 54 with frequent PVCs. No morphologic change compared to previous. Axis -56, , , .     RADIOLOGY:   I reviewed the radiologist interpretations:     XR CHEST PORTABLE (Final result)  Result time 10/10/22 10:51:05  Final result by Ace Gray Shahzad Berman MD (10/10/22 10:51:05)                Impression:    Unchanged appearance of the chest without acute airspace disease identified. Narrative:    EXAMINATION:   ONE XRAY VIEW OF THE CHEST     10/10/2022 10:27 am     COMPARISON:   09/19/2022, 09/15/2022     HISTORY:   ORDERING SYSTEM PROVIDED HISTORY: dypsnea   TECHNOLOGIST PROVIDED HISTORY:   dypsnea   Reason for Exam: Loss of consciousness     FINDINGS:   The cardiomediastinal silhouette is unchanged in appearance. Fullness in the   right paratracheal region corresponds to vascular tortuosity, as demonstrated   on prior chest CT imaging. Aortic contour enlargement again demonstrated. Curvilinear opacities in the lung bases are noted compatible with   subsegmental atelectasis or scarring. There is no consolidation,   pneumothorax, or evidence of edema. No effusion is appreciated. The osseous   structures are unchanged in appearance. CT HEAD WO CONTRAST (Final result)  Result time 10/10/22 10:35:25  Final result by Darci Chapman MD (10/10/22 10:35:25)                Impression:    Chronic findings in the brain without acute CT abnormality identified. Mild paranasal sinus mucosal disease. Narrative:    EXAMINATION:   CT OF THE HEAD WITHOUT CONTRAST  10/10/2022 10:22 am     TECHNIQUE:   CT of the head was performed without the administration of intravenous   contrast. Automated exposure control, iterative reconstruction, and/or weight   based adjustment of the mA/kV was utilized to reduce the radiation dose to as   low as reasonably achievable.      COMPARISON:   04/20/2018, 05/07/2018     HISTORY:   ORDERING SYSTEM PROVIDED HISTORY: syncope   TECHNOLOGIST PROVIDED HISTORY:     syncope   Decision Support Exception - unselect if not a suspected or confirmed   emergency medical condition->Emergency Medical Condition (MA)   Reason for Exam: syncope     FINDINGS:   BRAIN/VENTRICLES: There is no acute intracranial hemorrhage, mass effect or   midline shift. No abnormal extra-axial fluid collection. Cortical atrophy   and chronic white matter changes in the brain and associated ventricular   enlargement are again demonstrated. ORBITS: The visualized portion of the orbits demonstrate no acute abnormality. SINUSES: Mild paranasal sinus mucosal thickening. SOFT TISSUES/SKULL:  No acute abnormality of the visualized skull or soft   tissues.                    LABS:  Results for orders placed or performed during the hospital encounter of 10/10/22   CBC with Auto Differential   Result Value Ref Range    WBC 9.2 3.5 - 11.3 k/uL    RBC 4.43 3.95 - 5.11 m/uL    Hemoglobin 12.4 11.9 - 15.1 g/dL    Hematocrit 39.1 36.3 - 47.1 %    MCV 88.3 82.6 - 102.9 fL    MCH 28.0 25.2 - 33.5 pg    MCHC 31.7 25.2 - 33.5 g/dL    RDW 15.1 (H) 11.8 - 14.4 %    Platelets 987 141 - 460 k/uL    MPV 8.8 8.1 - 13.5 fL    NRBC Automated 0.0 0.0 per 100 WBC    Seg Neutrophils 74 (H) 36 - 65 %    Lymphocytes 13 (L) 24 - 43 %    Monocytes 7 3 - 12 %    Eosinophils % 4 1 - 4 %    Basophils 1 0 - 2 %    Immature Granulocytes 1 (H) 0 %    Segs Absolute 6.87 1.50 - 8.10 k/uL    Absolute Lymph # 1.21 1.10 - 3.70 k/uL    Absolute Mono # 0.62 0.10 - 1.20 k/uL    Absolute Eos # 0.39 0.00 - 0.44 k/uL    Basophils Absolute 0.05 0.00 - 0.20 k/uL    Absolute Immature Granulocyte 0.08 0.00 - 0.30 k/uL    RBC Morphology ANISOCYTOSIS PRESENT    Comprehensive Metabolic Panel   Result Value Ref Range    Glucose 100 (H) 70 - 99 mg/dL    BUN 11 8 - 23 mg/dL    Creatinine 0.72 0.50 - 0.90 mg/dL    Est, Glom Filt Rate >60 >60 mL/min/1.73m2    Bun/Cre Ratio 15 9 - 20    Calcium 9.7 8.6 - 10.4 mg/dL    Sodium 137 135 - 144 mmol/L    Potassium 4.0 3.7 - 5.3 mmol/L    Chloride 101 98 - 107 mmol/L    CO2 30 20 - 31 mmol/L    Anion Gap 6 (L) 9 - 17 mmol/L    Alkaline Phosphatase 65 35 - 104 U/L    ALT 12 5 - 33 U/L    AST 16 <32 U/L    Total Bilirubin 0.3 0.3 - 1.2 mg/dL Total Protein 6.1 (L) 6.4 - 8.3 g/dL    Albumin 3.1 (L) 3.5 - 5.2 g/dL    Albumin/Globulin Ratio 1.0 1.0 - 2.5   Brain Natriuretic Peptide   Result Value Ref Range    Pro-BNP 1,329 (H) <300 pg/mL   Troponin   Result Value Ref Range    Troponin, High Sensitivity 23 (H) 0 - 14 ng/L   Urinalysis with Reflex to Culture    Specimen: Urine, clean catch   Result Value Ref Range    Glucose, Ur NEGATIVE NEGATIVE    Bilirubin Urine NEGATIVE NEGATIVE    Ketones, Urine NEGATIVE NEGATIVE    Specific Gravity, UA 1.015 1.010 - 1.025    Urine Hgb NEGATIVE NEGATIVE    pH, UA 7.0 (H) 5.0 - 6.0    Protein, UA NEGATIVE NEGATIVE    Urobilinogen, Urine Normal Normal    Nitrite, Urine NEGATIVE NEGATIVE    Leukocyte Esterase, Urine 2+ (A) NEGATIVE   Microscopic Urinalysis   Result Value Ref Range    WBC, UA 10 TO 25 0 - 4 /HPF    RBC, UA 0 TO 4 0 - 4 /HPF    Epithelial Cells UA None 0 - 5 /HPF    Bacteria, UA 1+ (A) None    Other Observations UA Specimen Cultured (A) NOT REQ. EKG 12 Lead   Result Value Ref Range    Ventricular Rate 54 BPM    Atrial Rate 54 BPM    P-R Interval 172 ms    QRS Duration 120 ms    Q-T Interval 536 ms    QTc Calculation (Bazett) 508 ms    P Axis 36 degrees    R Axis -56 degrees    T Axis 105 degrees         EMERGENCY DEPARTMENT COURSE:   Vitals:    Vitals:    10/10/22 0946   BP: (!) 112/33   Pulse: 54   Resp: 18   Temp: (!) 96.3 °F (35.7 °C)   TempSrc: Tympanic   SpO2: 90%     -------------------------  BP: (!) 112/33, Temp: (!) 96.3 °F (35.7 °C), Heart Rate: 54, Resp: 18      Re-evaluation Notes    I will treat the patient for UTI. My index of suspicion for ACS or CVA is low. She will be going back to the nursing home. The patient is discharged in good condition    FINAL IMPRESSION      1. Near syncope    2.  Urinary tract infection without hematuria, site unspecified          DISPOSITION/PLAN   DISPOSITION Decision To Discharge 10/10/2022 11:16:52 AM      Condition on Disposition    good    PATIENT REFERRED TO:  Isra Díaz MD  [address] [place] [address]  [phone]    In 1 week      DISCHARGE MEDICATIONS:  Current Discharge Medication List        START taking these medications    Details   nitrofurantoin, macrocrystal-monohydrate, (MACROBID) 100 MG capsule Take 1 capsule by mouth 2 times daily for 7 doses  Qty: 14 capsule, Refills: 0             (Please note that portions of this note were completed with a voice recognition program.  Efforts were made to edit the dictations but occasionally words are mis-transcribed.)    Robyne Habermann, MD,, MD   Attending Emergency Physician         Joseph Taylor MD  10/10/22 [id number]

## 2022-10-10 NOTE — DISCHARGE INSTRUCTIONS
Encourage fluids. Macrobid as directed. Continue other medications as directed. Return for mental status change, weakness, or if worse in any way    Please understand that at this time there is no evidence for a more serious underlying process, but that early in the process of an illness or injury, an emergency department workup can be falsely reassuring. You should contact your family doctor within the next 48 hours for a follow up appointment    Onielloretomiguel Sanchez!!!    From [facility] ([facility] and [facility] Emergency Services    On behalf of the Emergency Department staff at [facility]), I would like to thank you for giving us the opportunity to address your health care needs and concerns. We hope that during your visit, our service was delivered in a professional and caring manner. Please keep [facility] in mind as we walk with you down the path to your own personal wellness. Please expect an automated text message or email from us so we can ask a few questions about your health and progress. Based on your answers, a clinician may call you back to offer help and instructions. Please understand that early in the process of an illness or injury, an emergency department workup can be falsely reassuring. If you notice any worsening, changing or persistent symptoms please call your family doctor or return to the ER immediately. Tell us how we did during your visit at [email or web address]   and let us know about your experience

## 2022-10-11 ENCOUNTER — OUTSIDE SERVICES (OUTPATIENT)
Dept: FAMILY MEDICINE CLINIC | Age: 87
End: 2022-10-11
Payer: MEDICARE

## 2022-10-11 DIAGNOSIS — N30.01 ACUTE CYSTITIS WITH HEMATURIA: Primary | ICD-10-CM

## 2022-10-11 DIAGNOSIS — R41.0 CONFUSION: ICD-10-CM

## 2022-10-11 LAB
CULTURE: ABNORMAL
SPECIMEN DESCRIPTION: ABNORMAL

## 2022-10-11 PROCEDURE — 99309 SBSQ NF CARE MODERATE MDM 30: CPT | Performed by: FAMILY MEDICINE

## 2022-10-28 ASSESSMENT — ENCOUNTER SYMPTOMS
ABDOMINAL PAIN: 0
WHEEZING: 0
COUGH: 1
SHORTNESS OF BREATH: 1
CONSTIPATION: 0
DIARRHEA: 0
CHEST TIGHTNESS: 1

## 2022-10-28 NOTE — PROGRESS NOTES
[facility]  [address] [address]  Dept: [phone]  Dept Fax: [phone]  Loc: [phone]    Jovani Aguiar  is a 80 y.o. female who presents today for her medical conditions/complaints as noted below. Jovani Aguiar is c/o of     Chief Complaint   Patient presents with    COPD    Hypertension    Anxiety    Dementia       HPI:   Kiara Rowe is being seen for her regular monthly follow up while at [facility]. Overall, patient is doing better. She was in the hospital two weeks ago with pneumonia, although it did turn out not to be COVID, but she is still recovering from that. Patient has finished her antibiotics, but she still feels weak and tired. She still has a cough. I asked her if she is still participating in activities and things like that and she said that she is she just needs more naps and rest in between activities. She was up walking around the building today when I saw her today, she was laying in her bed, but she had already had a walk this morning and then later during my visit with other residents I did see her walking the halls and she did walk down to the cafeteria without any trouble. Patient has been using oxygen occasionally at night, but she has not needed it during the day for the last couple of days. She still feels tight and wheezy. Her cough is tight, and she is not bringing up anything when she coughs. Patient also still has some headache and sinus congestion but a lot of that is chronic and something that she has pretty much all of the time. She has not had any worsening episodes of paranoia or anxiety since getting back from the hospital, which has been a problem for her in the past. Otherwise, patient is not having any problems with her belly. No constipation or diarrhea. She has not had any fevers recently. Overall, she feels like she is getting a little bit better.     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 TC, RRT 4/20/18   Substance Use Topics    Alcohol use: No     Alcohol/week: 0.0 standard drinks      Prior to Visit Medications    Medication Sig Taking?  Authorizing Provider   isosorbide mononitrate (IMDUR) 60 MG extended release tablet Take 1 tablet by mouth daily  Elijah Moses MD   metoprolol tartrate (LOPRESSOR) 50 MG tablet Take 1 tablet by mouth 2 times daily  Elijah Moses MD   furosemide (LASIX) 40 MG tablet Take 1 tablet by mouth daily  Elijah Moses MD   sodium chloride 1 g tablet Take 1 tablet by mouth 2 times daily (with meals)  Elijah Moses MD   busPIRone (BUSPAR) 7.5 MG tablet Take 1 tablet by mouth nightly  Elijah Moses MD   busPIRone (BUSPAR) 10 MG tablet Take 1 tablet by mouth every morning  Elijah Moses MD   hydrOXYzine pamoate (VISTARIL) 25 MG capsule Take 1 capsule by mouth nightly as needed for Anxiety  Elijah Moses MD   mirtazapine (REMERON) 7.5 MG tablet Take 1 tablet by mouth nightly  Elijah Moses MD   PARoxetine (PAXIL) 10 MG tablet Take 1 tablet by mouth nightly  Elijah Moses MD   acetaminophen (TYLENOL) 500 MG tablet Take 500 mg by mouth every 6 hours as needed for Pain  Historical Provider, MD   loperamide (IMODIUM) 2 MG capsule Take 2 mg by mouth 3 times daily as needed for Diarrhea  Historical Provider, MD   Acetylcysteine, Nutrient, 600 MG TABS Take 2 tablets by mouth in the morning and at bedtime Indications: Neurotic Excoriation  Historical Provider, MD   vitamin D3 (CHOLECALCIFEROL) 25 MCG (1000 UT) TABS tablet Take 4,000 Units by mouth daily Indications: Supplement  Historical Provider, MD   diphenhydrAMINE (BENADRYL) 25 MG tablet Take 25 mg by mouth every 6 hours as needed for Itching  Historical Provider, MD   Magic Mouthwash (MIRACLE MOUTHWASH) Swish and spit 10 mLs 4 times daily as needed (MOUTH PAIN) 1:1 Lidocaine,Nystatin,Diphenhydramine swish and spit.   Historical Provider, MD   camphor-menthol (SARNA) 0.5-0.5 % lotion Apply to effected areas topically at bedtime for itching  Historical Provider, MD   albuterol (PROVENTIL) (2.5 MG/3ML) 0.083% nebulizer solution Take 2.5 mg by nebulization 2 times daily  Historical Provider, MD   benzonatate (TESSALON) 200 MG capsule Take 200 mg by mouth 3 times daily as needed for Cough  Historical Provider, MD   guaiFENesin (ROBITUSSIN) 100 MG/5ML syrup Take 10 mLs by mouth 4 times daily as needed for Cough or Congestion  Historical Provider, MD   lisinopril (PRINIVIL;ZESTRIL) 5 MG tablet Take 1 tablet by mouth daily  Keith Albert MD   bethanechol (URECHOLINE) 25 MG tablet Take 1 tablet by mouth 4 times daily  Keith Albert MD   Multiple Vitamin (MULTI VITAMIN DAILY) TABS Take 1 tablet by mouth daily Take 1 tab po daily  Ariel Clay MD   fluticasone (FLONASE) 50 MCG/ACT nasal spray 1 spray by Nasal route daily  Ariel Clay MD   hydrochlorothiazide (MICROZIDE) 12.5 MG capsule take 1 capsule by mouth once daily  Ariel Clay MD   Calcium Carbonate (CALCIUM 600 PO) Take 600 mg by mouth 2 times daily  Historical Provider, MD   loratadine (CLARITIN) 10 MG capsule Take 1 capsule by mouth daily  Milly Gray MD   potassium chloride (KLOR-CON M) 20 MEQ extended release tablet Take 1 tablet by mouth 2 times daily  Milly Gray MD   aspirin 81 MG chewable tablet Take 1 tablet by mouth daily  Milly Gray MD   budesonide-formoterol (SYMBICORT) 80-4.5 MCG/ACT AERO Inhale 2 puffs into the lungs 2 times daily  Kameron Gonzalez MD   nitroGLYCERIN (NITROSTAT) 0.4 MG SL tablet Place 1 tablet under the tongue every 5 minutes as needed for Chest pain  Aye Walton MD   aluminum & magnesium hydroxide-simethicone (MAALOX REGULAR STRENGTH) 200-200-20 MG/5ML SUSP suspension Take 30 mLs by mouth every 6 hours as needed for Adolm MD Ethan   levothyroxine (SYNTHROID) 50 MCG tablet Take 1 tablet by mouth Daily  Milly Gray MD   donepezil (ARICEPT) 10 MG tablet Take 10 mg by mouth daily  Historical Provider, MD   simethicone (MYLICON) 266 MG chewable tablet Take 125 mg by mouth every 6 hours as needed for Flatulence  Historical Provider, MD   OXYGEN Inhale 2 L/min into the lungs nightly. Historical Provider, MD     Allergies   Allergen Reactions    Amlodipine Other (See Comments)     Pt says she got all the side effects      Sulfa Antibiotics     Cleocin [Clindamycin] Rash    Macrolides And Ketolides Rash     \"All mycins\"    Pcn [Penicillins] Rash    Tetracyclines & Related Rash    Zithromax [Azithromycin] Rash       Subjective:      Review of Systems   Constitutional:  Negative for activity change, appetite change, chills, fatigue and fever. HENT:  Positive for congestion. Respiratory:  Positive for cough, chest tightness and shortness of breath. Negative for wheezing. Cardiovascular:  Negative for chest pain, palpitations and leg swelling. Gastrointestinal:  Negative for abdominal pain, constipation and diarrhea. Genitourinary:  Negative for difficulty urinating. Skin:  Positive for rash. Neurological:  Positive for headaches.  Negative for dizziness, syncope and light-headedness. Psychiatric/Behavioral:  Positive for confusion and decreased concentration. Negative for behavioral problems, dysphoric mood and sleep disturbance. The patient is not nervous/anxious. Objective:     Physical Exam  Vitals and nursing note reviewed. Constitutional:       General: She is not in acute distress. Appearance: She is well-developed. Eyes:      Conjunctiva/sclera: Conjunctivae normal.   Neck:      Thyroid: No thyromegaly. Cardiovascular:      Rate and Rhythm: Normal rate and regular rhythm. Heart sounds: Normal heart sounds. No murmur heard. Pulmonary:      Effort: Pulmonary effort is normal. No respiratory distress. Breath sounds: Examination of the right-upper field reveals wheezing. Examination of the left-upper field reveals wheezing. Examination of the right-middle field reveals wheezing. Examination of the left-middle field reveals wheezing. Examination of the right-lower field reveals wheezing. Examination of the left-lower field reveals wheezing. Wheezing present. Abdominal:      General: Abdomen is flat. Bowel sounds are normal.      Palpations: Abdomen is soft. Tenderness: There is no abdominal tenderness. There is no guarding or rebound. Musculoskeletal:      Cervical back: Normal range of motion and neck supple. Lymphadenopathy:      Cervical: No cervical adenopathy. Skin:     General: Skin is warm and dry. Findings: No erythema or rash. Neurological:      General: No focal deficit present. Mental Status: She is alert. Mental status is at baseline. Psychiatric:         Mood and Affect: Mood normal.         Speech: Speech normal.         Behavior: Behavior normal.         Cognition and Memory: Memory is impaired. Assessment:       Diagnosis Orders   1. Mucopurulent chronic bronchitis ([address]        2. Thoracic aortic aneurysm without rupture ([address]        3. Essential hypertension        4.  Paranoia ([address] 5. Late onset Alzheimer's disease without behavioral disturbance ([facility] [address]                  Plan:   COPD. Improving. She seems to be better regarding the pneumonia standpoint. She is not having any productive cough. Overall, her breathing has gotten better. She is not needing oxygen anymore. She is still wheezing on expiration, which is not normal for her. I will start her on 40mg of Prednisone for her to use for 5 days to try to help finish clearing that up for her. Thoracic aortic aneurysm. Stable. No recent issues. Hypertension. Improving. Her blood pressure was quite high in the hospital but it has to return to normal now that she is back where she is comfortable and that she is feeling better. Paranoia. Stable. Patient continues to be paranoid and sure that people are stealing her things but it has not worsened since she was in the hospital which have happened in the past. Overall, she seems pretty stable. Alzheimer's disease. Stable. She continues to be confused at baseline, but overall has not had any significant decline in her cognition. Return in about 1 month (around 10/27/2022) for HTN follow up, COPD follow up. Patient given educational materials - see patient instructions. Discussed use, benefit, and side effects of prescribed medications. All patient questions answered. Patient voiced understanding. Reviewed health maintenance. Instructed to continue current medications, diet and exercise. Patient agreed with treatment plan. Follow up as directed. Latrell De La Garza am personally transcribing for Khushbu Gonzalez MD 10/28/22 at 3:35 PM EDT. Rafael Quevedo MD, personally performed the services described in this document as transcribed by the , and it is both accurate and complete.     Electronically signed by Khushbu Gonzalez MD on 10/28/22 at 4:24 PM EDT

## 2022-11-08 ENCOUNTER — OUTSIDE SERVICES (OUTPATIENT)
Dept: FAMILY MEDICINE CLINIC | Age: 87
End: 2022-11-08
Payer: MEDICARE

## 2022-11-08 DIAGNOSIS — F02.80 LATE ONSET ALZHEIMER'S DISEASE WITHOUT BEHAVIORAL DISTURBANCE (HCC): ICD-10-CM

## 2022-11-08 DIAGNOSIS — U07.1 COVID-19: ICD-10-CM

## 2022-11-08 DIAGNOSIS — J41.1 MUCOPURULENT CHRONIC BRONCHITIS (HCC): Primary | ICD-10-CM

## 2022-11-08 DIAGNOSIS — G30.1 LATE ONSET ALZHEIMER'S DISEASE WITHOUT BEHAVIORAL DISTURBANCE (HCC): ICD-10-CM

## 2022-11-08 DIAGNOSIS — B96.89 ACUTE BACTERIAL SINUSITIS: ICD-10-CM

## 2022-11-08 DIAGNOSIS — J01.90 ACUTE BACTERIAL SINUSITIS: ICD-10-CM

## 2022-11-08 DIAGNOSIS — F22 PARANOIA (HCC): ICD-10-CM

## 2022-11-08 DIAGNOSIS — I10 ESSENTIAL HYPERTENSION: ICD-10-CM

## 2022-11-08 PROCEDURE — 99309 SBSQ NF CARE MODERATE MDM 30: CPT | Performed by: FAMILY MEDICINE

## 2022-11-10 ASSESSMENT — ENCOUNTER SYMPTOMS
COUGH: 0
ABDOMINAL PAIN: 0
CHEST TIGHTNESS: 0
CONSTIPATION: 0
WHEEZING: 0
SHORTNESS OF BREATH: 0
DIARRHEA: 0

## 2022-11-10 NOTE — PROGRESS NOTES
[facility]  [address] [address]  Dept: [phone]  Dept Fax: [phone]  Loc: [phone]    Munir Yuan  is a 80 y.o. female who presents today for her medical conditions/complaints as noted below. Munir Yuan is c/o of     Chief Complaint   Patient presents with    Urinary Tract Infection    Extremity Weakness    Altered Mental Status       HPI:   Amarilys Lee is being seen for an acute visit while at [facility]. Patient was taken to the emergency room a couple of days ago with symptoms of altered mental status and concerns for stroke. At that time, her workup showed a UTI and nothing else. Patient was started on antibiotics in the emergency room and discharged back to [facility]. While in the ER she did not have any signs of elevated troponin, blood pressure was relatively stable, and no signs of stroke that the ER doctor could find. After being started on antibiotics she has improved dramatically. Patient stated that she is completely back to herself, and she looked as much walking in the halls. Nursing staff has not had any concerns about further behavior changes or confusion. They also stated that her appetite has been fantastic and that her weakness from her previous hospitalization a month ago seems to have completely resolved as well. When I saw the patient today, she was walking up and down the halls and on her way to the cafeteria and she looked completely back to her baseline which is a great sign.      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 TC, RRT 4/20/18   Substance Use Topics    Alcohol use: No     Alcohol/week: 0.0 standard drinks      Prior to Visit Medications    Medication Sig Taking?  Authorizing Provider   isosorbide mononitrate (IMDUR) 60 MG extended release tablet Take 1 tablet by mouth daily  Keith Albert MD   metoprolol tartrate (LOPRESSOR) 50 MG tablet Take 1 tablet by mouth 2 times daily  Keith Albert MD   furosemide (LASIX) 40 MG tablet Take 1 tablet by mouth daily  Keith Albert MD   sodium chloride 1 g tablet Take 1 tablet by mouth 2 times daily (with meals)  Keith Albert MD   busPIRone (BUSPAR) 7.5 MG tablet Take 1 tablet by mouth nightly  Keith Albert MD   busPIRone (BUSPAR) 10 MG tablet Take 1 tablet by mouth every morning  Keith Albert MD   hydrOXYzine pamoate (VISTARIL) 25 MG capsule Take 1 capsule by mouth nightly as needed for Anxiety  Keith Albert MD   mirtazapine (REMERON) 7.5 MG tablet Take 1 tablet by mouth nightly  Keith Albert MD   PARoxetine (PAXIL) 10 MG tablet Take 1 tablet by mouth nightly  Keith Albert MD   acetaminophen (TYLENOL) 500 MG tablet Take 500 mg by mouth every 6 hours as needed for Pain  Historical Provider, MD   loperamide (IMODIUM) 2 MG capsule Take 2 mg by mouth 3 times daily as needed for Diarrhea  Historical Provider, MD   Acetylcysteine, Nutrient, 600 MG TABS Take 2 tablets by mouth in the morning and at bedtime Indications: Neurotic Excoriation  Historical Provider, MD   vitamin D3 (CHOLECALCIFEROL) 25 MCG (1000 UT) TABS tablet Take 4,000 Units by mouth daily Indications: Supplement  Historical Provider, MD   diphenhydrAMINE (BENADRYL) 25 MG tablet Take 25 mg by mouth every 6 hours as needed for Itching  Historical Provider, MD   Magic Mouthwash (MIRACLE MOUTHWASH) Swish and spit 10 mLs 4 times daily as needed (MOUTH PAIN) 1:1 Lidocaine,Nystatin,Diphenhydramine swish and spit.   Historical Provider, MD   camphor-menthol (SARNA) 0.5-0.5 % lotion Apply to effected areas topically at bedtime for itching  Historical Provider, MD   albuterol (PROVENTIL) (2.5 MG/3ML) 0.083% nebulizer solution Take 2.5 mg by nebulization 2 times daily  Historical Provider, MD   benzonatate (TESSALON) 200 MG capsule Take 200 mg by mouth 3 times daily as needed for Cough  Historical Provider, MD   guaiFENesin (ROBITUSSIN) 100 MG/5ML syrup Take 10 mLs by mouth 4 times daily as needed for Cough or Congestion  Historical Provider, MD   lisinopril (PRINIVIL;ZESTRIL) 5 MG tablet Take 1 tablet by mouth daily  Mariam Oliveros MD   bethanechol (URECHOLINE) 25 MG tablet Take 1 tablet by mouth 4 times daily  Mariam Oliveros MD   Multiple Vitamin (MULTI VITAMIN DAILY) TABS Take 1 tablet by mouth daily Take 1 tab po daily  Inés Lazo MD   fluticasone (FLONASE) 50 MCG/ACT nasal spray 1 spray by Nasal route daily  Inés Lazo MD   hydrochlorothiazide (MICROZIDE) 12.5 MG capsule take 1 capsule by mouth once daily  Inés Lazo MD   Calcium Carbonate (CALCIUM 600 PO) Take 600 mg by mouth 2 times daily  Historical Provider, MD   loratadine (CLARITIN) 10 MG capsule Take 1 capsule by mouth daily  Inés Lazo MD   potassium chloride (KLOR-CON M) 20 MEQ extended release tablet Take 1 tablet by mouth 2 times daily  Inés Lazo MD   aspirin 81 MG chewable tablet Take 1 tablet by mouth daily  Inés Lazo MD   budesonide-formoterol (SYMBICORT) 80-4.5 MCG/ACT AERO Inhale 2 puffs into the lungs 2 times daily  Eli Cornet, MD   nitroGLYCERIN (NITROSTAT) 0.4 MG SL tablet Place 1 tablet under the tongue every 5 minutes as needed for Chest pain  Herminia Sanches MD   aluminum & magnesium hydroxide-simethicone (MAALOX REGULAR STRENGTH) 200-200-20 MG/5ML SUSP suspension Take 30 mLs by mouth every 6 hours as needed for August MD Chad   levothyroxine (SYNTHROID) 50 MCG tablet Take 1 tablet by mouth Daily  Rupesh Simmons MD   donepezil (ARICEPT) 10 MG tablet Take 10 mg by mouth daily  Historical Provider, MD   simethicone (MYLICON) 998 MG chewable tablet Take 125 mg by mouth every 6 hours as needed for Flatulence  Historical Provider, MD   OXYGEN Inhale 2 L/min into the lungs nightly. Historical Provider, MD     Allergies   Allergen Reactions    Amlodipine Other (See Comments)     Pt says she got all the side effects      Sulfa Antibiotics     Cleocin [Clindamycin] Rash    Macrolides And Ketolides Rash     \"All mycins\"    Pcn [Penicillins] Rash    Tetracyclines & Related Rash    Zithromax [Azithromycin] Rash       Subjective:      Review of Systems   Constitutional:  Negative for activity change, appetite change, chills, fatigue and fever. Respiratory:  Negative for cough, chest tightness, shortness of breath and wheezing. Cardiovascular:  Negative for chest pain, palpitations and leg swelling. Gastrointestinal:  Negative for abdominal pain, constipation and diarrhea. Genitourinary:  Negative for difficulty urinating. Skin:  Negative for rash. Neurological:  Negative for dizziness, syncope, weakness, light-headedness and headaches. Psychiatric/Behavioral:  Positive for confusion and decreased concentration. Negative for behavioral problems, dysphoric mood and sleep disturbance. The patient is not nervous/anxious. Objective:     Physical Exam  Vitals and nursing note reviewed. Constitutional:       General: She is not in acute distress. Appearance: She is well-developed.    Eyes:      Conjunctiva/sclera: Conjunctivae normal. Neck:      Thyroid: No thyromegaly. Cardiovascular:      Rate and Rhythm: Normal rate and regular rhythm. Heart sounds: Normal heart sounds. No murmur heard. Pulmonary:      Effort: Pulmonary effort is normal. No respiratory distress. Breath sounds: Normal breath sounds. No wheezing. Abdominal:      General: Abdomen is flat. Bowel sounds are normal.      Palpations: Abdomen is soft. Tenderness: There is no abdominal tenderness. There is no guarding or rebound. Musculoskeletal:      Cervical back: Normal range of motion and neck supple. Lymphadenopathy:      Cervical: No cervical adenopathy. Skin:     General: Skin is warm and dry. Findings: No erythema or rash. Neurological:      General: No focal deficit present. Mental Status: She is alert. Mental status is at baseline. Psychiatric:         Mood and Affect: Mood normal.         Speech: Speech normal.         Behavior: Behavior normal.         Cognition and Memory: Memory is impaired. Assessment:       Diagnosis Orders   1. Acute cystitis with hematuria        2. Weakness generalized        3. Confusion                  Plan:   UTI. Improving. Patient is doing much better since starting on antibiotics. At this point, symptoms seem to have resolved completely. Patient is back to baseline. I will continue to monitor. Altered mental status. Resolved. This was due to the UTI and as above it has completely resolved. Return in about 1 month (around 11/11/2022) for COPD follow up. Patient given educational materials - see patient instructions. Discussed use, benefit, and side effects of prescribed medications. All patient questions answered. Patient voiced understanding. Reviewed health maintenance. Instructed to continue current medications, diet and exercise. Patient agreed with treatment plan. Follow up as directed.          Kelsey Rasheed am personally transcribing for Stephanie Agarwal MD 11/10/22 at 2:00 PM IOANA. Nancy Friedman MD, personally performed the services described in this document as transcribed by the , and it is both accurate and complete.     Electronically signed by Yandel Shore MD on 11/10/22 at 4:34 PM EST

## 2022-11-11 ASSESSMENT — PATIENT HEALTH QUESTIONNAIRE - PHQ9
2. FEELING DOWN, DEPRESSED OR HOPELESS: 0
1. LITTLE INTEREST OR PLEASURE IN DOING THINGS: 0
SUM OF ALL RESPONSES TO PHQ QUESTIONS 1-9: 0
SUM OF ALL RESPONSES TO PHQ9 QUESTIONS 1 & 2: 0
SUM OF ALL RESPONSES TO PHQ QUESTIONS 1-9: 0

## 2022-12-19 ASSESSMENT — ENCOUNTER SYMPTOMS
CONSTIPATION: 0
CHEST TIGHTNESS: 0
FACIAL SWELLING: 1
SINUS PAIN: 1
ABDOMINAL PAIN: 0
WHEEZING: 0
DIARRHEA: 0
SHORTNESS OF BREATH: 0
COUGH: 1

## 2022-12-19 NOTE — PROGRESS NOTES
[address] [address]  [address] [address]  Dept: [phone]  Dept Fax: [phone]  Loc: [phone]    Juan F Duran  is a 80 y.o. female who presents today for her medical conditions/complaints as noted below. Juan F Duran is c/o of     Chief Complaint   Patient presents with    Cough    Hypertension    Anxiety    Positive For Covid-19       HPI:   Perla Villarreal is being seen for her regular monthly follow up while at [facility]. Overall, patient has been doing OK. She did have COVID two weeks ago, but she seems to be recovering from that. She feels like her breathing is about at baseline. She still has a little bit of a cough but that is not unusual for her either. Patient said that she does not feel anymore short of breath than she normally does. Patient is still a little bit more tired than normal she is taking a little bit more naps bye that has been a little bit more of an issue for her over the last several months, so it is hard to tell if that is mostly COVID related or if it is just the aging process. One thing that I noticed today when I walked in is that when she sat up to talk to me her face was very swollen. She had significant swelling of both cheeks to the point that there was a very dramatic crease where her pillow had been on her cheek. I asked if she was having any trouble swallowing or breathing or feeling like her throat was swelling and she said no. There is no redness or signs of rash or allergy on her face, but she said she had a horrible headache and she felt like there was pulsating under her eyes. I suspect that she has developed a sinus infection since having COVID which is likely contributing to a significant amount of the swelling. Otherwise, patient reports having a good appetite.  No problems with feeling any increased anxiety despite the fact that her room was moved several times while she was COVID positive. Overall, she seemed to think that she was doing OK. 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 TC, RRT 4/20/18   Substance Use Topics    Alcohol use: No     Alcohol/week: 0.0 standard drinks      Prior to Visit Medications    Medication Sig Taking?  Authorizing Provider   isosorbide mononitrate (IMDUR) 60 MG extended release tablet Take 1 tablet by mouth daily  Arlette Braun MD   metoprolol tartrate (LOPRESSOR) 50 MG tablet Take 1 tablet by mouth 2 times daily  Arlette Braun MD   furosemide (LASIX) 40 MG tablet Take 1 tablet by mouth daily  Arlette Braun MD   sodium chloride 1 g tablet Take 1 tablet by mouth 2 times daily (with meals)  Arlette Braun MD   busPIRone (BUSPAR) 7.5 MG tablet Take 1 tablet by mouth nightly  Arlette Braun MD   busPIRone (BUSPAR) 10 MG tablet Take 1 tablet by mouth every morning  Arlette Braun MD   hydrOXYzine pamoate (VISTARIL) 25 MG capsule Take 1 capsule by mouth nightly as needed for Anxiety  Arlette Braun MD   mirtazapine (REMERON) 7.5 MG tablet Take 1 tablet by mouth nightly  Arlette Braun MD   PARoxetine (PAXIL) 10 MG tablet Take 1 tablet by mouth nightly  Ace Montana MD   acetaminophen (TYLENOL) 500 MG tablet Take 500 mg by mouth every 6 hours as needed for Pain  Historical Provider, MD   loperamide (IMODIUM) 2 MG capsule Take 2 mg by mouth 3 times daily as needed for Diarrhea  Historical Provider, MD   Acetylcysteine, Nutrient, 600 MG TABS Take 2 tablets by mouth in the morning and at bedtime Indications: Neurotic Excoriation  Historical Provider, MD   vitamin D3 (CHOLECALCIFEROL) 25 MCG (1000 UT) TABS tablet Take 4,000 Units by mouth daily Indications: Supplement  Historical Provider, MD   diphenhydrAMINE (BENADRYL) 25 MG tablet Take 25 mg by mouth every 6 hours as needed for Itching  Historical Provider, MD   Magic Mouthwash (MIRACLE MOUTHWASH) Swish and spit 10 mLs 4 times daily as needed (MOUTH PAIN) 1:1 Lidocaine,Nystatin,Diphenhydramine swish and spit.   Historical Provider, MD   camphor-menthol (SARNA) 0.5-0.5 % lotion Apply to effected areas topically at bedtime for itching  Historical Provider, MD   albuterol (PROVENTIL) (2.5 MG/3ML) 0.083% nebulizer solution Take 2.5 mg by nebulization 2 times daily  Historical Provider, MD   benzonatate (TESSALON) 200 MG capsule Take 200 mg by mouth 3 times daily as needed for Cough  Historical Provider, MD   guaiFENesin (ROBITUSSIN) 100 MG/5ML syrup Take 10 mLs by mouth 4 times daily as needed for Cough or Congestion  Historical Provider, MD   lisinopril (PRINIVIL;ZESTRIL) 5 MG tablet Take 1 tablet by mouth daily  Ace Montana MD   bethanechol (URECHOLINE) 25 MG tablet Take 1 tablet by mouth 4 times daily  Ace Montana MD   Multiple Vitamin (MULTI VITAMIN DAILY) TABS Take 1 tablet by mouth daily Take 1 tab po daily  Brian Sol MD   fluticasone (FLONASE) 50 MCG/ACT nasal spray 1 spray by Nasal route daily  Brian Sol MD   hydrochlorothiazide (MICROZIDE) 12.5 MG capsule take 1 capsule by mouth once daily  Brian Sol MD   Calcium Carbonate (CALCIUM 600 PO) Take 600 mg by mouth 2 times daily  Historical Provider, MD   loratadine (CLARITIN) 10 MG capsule Take 1 capsule by mouth daily  Aura Chand MD   potassium chloride (KLOR-CON M) 20 MEQ extended release tablet Take 1 tablet by mouth 2 times daily  Aura Chand MD   aspirin 81 MG chewable tablet Take 1 tablet by mouth daily  Aura Chand MD   budesonide-formoterol (SYMBICORT) 80-4.5 MCG/ACT AERO Inhale 2 puffs into the lungs 2 times daily  Aleksandra Ruelas MD   nitroGLYCERIN (NITROSTAT) 0.4 MG SL tablet Place 1 tablet under the tongue every 5 minutes as needed for Chest pain  Lady Nannette MD   aluminum & magnesium hydroxide-simethicone (MAALOX REGULAR STRENGTH) 200-200-20 MG/5ML SUSP suspension Take 30 mLs by mouth every 6 hours as needed for Phyllistine MD Britt   levothyroxine (SYNTHROID) 50 MCG tablet Take 1 tablet by mouth Daily  Aura Chand MD   donepezil (ARICEPT) 10 MG tablet Take 10 mg by mouth daily  Historical Provider, MD   simethicone (MYLICON) 146 MG chewable tablet Take 125 mg by mouth every 6 hours as needed for Flatulence  Historical Provider, MD   OXYGEN Inhale 2 L/min into the lungs nightly. Historical Provider, MD     Allergies   Allergen Reactions    Amlodipine Other (See Comments)     Pt says she got all the side effects      Sulfa Antibiotics     Cleocin [Clindamycin] Rash    Macrolides And Ketolides Rash     \"All mycins\"    Pcn [Penicillins] Rash    Tetracyclines & Related Rash    Zithromax [Azithromycin] Rash       Subjective:      Review of Systems   Constitutional:  Negative for activity change, appetite change, chills, fatigue and fever. HENT:  Positive for congestion (sinus), facial swelling and sinus pain. Respiratory:  Positive for cough. Negative for chest tightness, shortness of breath and wheezing. Cardiovascular:  Negative for chest pain, palpitations and leg swelling. Gastrointestinal:  Negative for abdominal pain, constipation and diarrhea.    Genitourinary: Negative for difficulty urinating. Skin:  Positive for rash. Neurological:  Negative for dizziness, syncope, weakness, light-headedness and headaches. Psychiatric/Behavioral:  Positive for confusion and decreased concentration. Negative for behavioral problems, dysphoric mood and sleep disturbance. The patient is not nervous/anxious. Objective:     Physical Exam  Vitals and nursing note reviewed. Constitutional:       General: She is not in acute distress. Appearance: She is well-developed. HENT:      Head:      Comments: Faces significantly swollen bilaterally all through the cheeks and up to the temples. No swelling of the lips. No signs of difficulty breathing. Eyes:      Conjunctiva/sclera: Conjunctivae normal.   Neck:      Thyroid: No thyromegaly. Cardiovascular:      Rate and Rhythm: Normal rate and regular rhythm. Heart sounds: Normal heart sounds. No murmur heard. Pulmonary:      Effort: Pulmonary effort is normal. No respiratory distress. Breath sounds: Normal breath sounds. No wheezing. Abdominal:      General: Abdomen is flat. Bowel sounds are normal.      Palpations: Abdomen is soft. Tenderness: There is no abdominal tenderness. There is no guarding or rebound. Musculoskeletal:      Cervical back: Normal range of motion and neck supple. Lymphadenopathy:      Cervical: No cervical adenopathy. Skin:     General: Skin is warm and dry. Findings: Rash present. No erythema. Comments: Legs are wrapped bilaterally to allow skin to heal. Rash on arms has completely resolved. Neurological:      General: No focal deficit present. Mental Status: She is alert. Mental status is at baseline. Psychiatric:         Mood and Affect: Mood normal.         Speech: Speech normal.         Behavior: Behavior normal.         Cognition and Memory: Memory is impaired. Assessment:       Diagnosis Orders   1. Mucopurulent chronic bronchitis ([facility] [address]        2.  Acute bacterial sinusitis        3. Essential hypertension        4. Paranoia ([facility] [address]        5. Late onset Alzheimer's disease without behavioral disturbance ([facility] [address]        6. COVID-19                  Plan:   COPD. Stable. Despite recently having COVID her lungs seem to be about at baseline. Patient is not any more short of breath than normal, and her cough has not really changed from her baseline. I will continue to monitor. Sinusitis. New. Seems to be last cause of her facial swelling. I did start her on Omnicef to treat that and hopefully that will improve her face as well as the pain that she is having in her head and sinuses. Hypertension. Stable. No recent issues with elevated blood pressure. She has not had any hypotensive events. I will continue to monitor. Paranoia. Stable. Patient tends to get more upset when something changes but she has had a different room a couple of times in the last month due to her COVID quarantine and she has overall done very well with that. I would say things have not worsened in that regard. Dementia. Stable. As above, she seems to be doing well despite a lot of different changes. No new issues with wandering or behavioral concerns. COVID-19. Improving. At this point she is out of quarantine and her symptoms have nearly resolved. The only thing that she seems to have remaining is a secondary bacterial component of sinusitis, which I am treating. Return in about 1 month (around 12/8/2022) for COPD follow up. Patient given educational materials - see patient instructions. Discussed use, benefit, and side effects of prescribed medications. All patient questions answered. Patient voiced understanding. Reviewed health maintenance. Instructed to continue current medications, diet and exercise. Patient agreed with treatment plan. Follow up as directed. Pierce Barraza am personally transcribing for Gudelia Quevedo MD 12/19/22 at 3:16 PM IOANA.         Anju Allen

## 2022-12-27 ENCOUNTER — OUTSIDE SERVICES (OUTPATIENT)
Dept: FAMILY MEDICINE CLINIC | Age: 87
End: 2022-12-27

## 2022-12-27 DIAGNOSIS — G30.1 LATE ONSET ALZHEIMER'S DISEASE WITHOUT BEHAVIORAL DISTURBANCE (HCC): ICD-10-CM

## 2022-12-27 DIAGNOSIS — F02.80 LATE ONSET ALZHEIMER'S DISEASE WITHOUT BEHAVIORAL DISTURBANCE (HCC): ICD-10-CM

## 2022-12-27 DIAGNOSIS — F22 PARANOIA (HCC): ICD-10-CM

## 2022-12-27 DIAGNOSIS — J41.1 MUCOPURULENT CHRONIC BRONCHITIS (HCC): Primary | ICD-10-CM

## 2022-12-27 DIAGNOSIS — I10 ESSENTIAL HYPERTENSION: ICD-10-CM

## 2023-01-18 ASSESSMENT — ENCOUNTER SYMPTOMS
WHEEZING: 0
CONSTIPATION: 0
CHEST TIGHTNESS: 0
SHORTNESS OF BREATH: 0
COUGH: 0
DIARRHEA: 0
ABDOMINAL PAIN: 0

## 2023-01-18 NOTE — PROGRESS NOTES
[address] [address]  [address] [address]  Dept: [phone]  Dept Fax: [phone]  Loc: [phone]    Gilbert Gtz  is a 80 y.o. female who presents today for her medical conditions/complaints as noted below. Gilbert Gtz is c/o of     Chief Complaint   Patient presents with    COPD    Sinusitis    Anxiety    Hypertension    Dementia       HPI:   Refugio Bose is being seen for her regular monthly follow up while at [facility]. Overall, patient has been doing OK. She says she has been feeling about the same as she has been for years. She continues to have daily headaches and she continues to be kind of chronically short of breath, but nothing has been worse recently and she has not had any increased cough. I thought that her face looked a little bit swollen to me but she said that she had not noticed anything and it was not bothering her and she was not having any trouble with swallowing or eating. Patient said that her appetite continues to be excellent and she has been eating very well. She has not had any problems with constipation or diarrhea. she continues to have a lot of sinus pressure and she blames her headaches on that but no recent issues with runny nose or increased congestion. Her skin is looking better, the skin on her arms has completely cleared up at this point and she actually looks really good. She continues to have the Bar boots on her legs but it appears that they have not been changed in a while because the raps are quite filthy. The nurse said she could not find an order for when they were last put on which I suspect means they have been on her for quite a long time. I suggested to the nurse that we take them off and see what her skin looks like underneath.  if her skin is healed at this point then she can leave them off, if her skin continues to be open or flaky then I would recommend that she have these wraps placed daily just to keep her hands off of them so that she does not pick at her legs and cause anymore sores. 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 TC, RRT 4/20/18   Substance Use Topics    Alcohol use: No     Alcohol/week: 0.0 standard drinks      Prior to Visit Medications    Medication Sig Taking?  Authorizing Provider   isosorbide mononitrate (IMDUR) 60 MG extended release tablet Take 1 tablet by mouth daily  Maribel Shoemaker MD   metoprolol tartrate (LOPRESSOR) 50 MG tablet Take 1 tablet by mouth 2 times daily  Maribel Shoemaker MD   furosemide (LASIX) 40 MG tablet Take 1 tablet by mouth daily  Maribel Shoemaker MD   sodium chloride 1 g tablet Take 1 tablet by mouth 2 times daily (with meals)  Maribel Shoemaker MD   busPIRone (BUSPAR) 7.5 MG tablet Take 1 tablet by mouth nightly  Maribel Shoemaker MD   busPIRone (BUSPAR) 10 MG tablet Take 1 tablet by mouth every morning  Maribel Shoemaker MD   hydrOXYzine pamoate (VISTARIL) 25 MG capsule Take 1 capsule by mouth nightly as needed for Anxiety  Maribel Shoemaker MD   mirtazapine (REMERON) 7.5 MG tablet Take 1 tablet by mouth nightly  Maribel Shoemaker MD PARoxetine (PAXIL) 10 MG tablet Take 1 tablet by mouth nightly  Tosha Gambino MD   acetaminophen (TYLENOL) 500 MG tablet Take 500 mg by mouth every 6 hours as needed for Pain  Historical Provider, MD   loperamide (IMODIUM) 2 MG capsule Take 2 mg by mouth 3 times daily as needed for Diarrhea  Historical Provider, MD   Acetylcysteine, Nutrient, 600 MG TABS Take 2 tablets by mouth in the morning and at bedtime Indications: Neurotic Excoriation  Historical Provider, MD   vitamin D3 (CHOLECALCIFEROL) 25 MCG (1000 UT) TABS tablet Take 4,000 Units by mouth daily Indications: Supplement  Historical Provider, MD   diphenhydrAMINE (BENADRYL) 25 MG tablet Take 25 mg by mouth every 6 hours as needed for Itching  Historical Provider, MD   Magic Mouthwash (MIRACLE MOUTHWASH) Swish and spit 10 mLs 4 times daily as needed (MOUTH PAIN) 1:1 Lidocaine,Nystatin,Diphenhydramine swish and spit.   Historical Provider, MD   camphor-menthol (SARNA) 0.5-0.5 % lotion Apply to effected areas topically at bedtime for itching  Historical Provider, MD   albuterol (PROVENTIL) (2.5 MG/3ML) 0.083% nebulizer solution Take 2.5 mg by nebulization 2 times daily  Historical Provider, MD   benzonatate (TESSALON) 200 MG capsule Take 200 mg by mouth 3 times daily as needed for Cough  Historical Provider, MD   guaiFENesin (ROBITUSSIN) 100 MG/5ML syrup Take 10 mLs by mouth 4 times daily as needed for Cough or Congestion  Historical Provider, MD   lisinopril (PRINIVIL;ZESTRIL) 5 MG tablet Take 1 tablet by mouth daily  Tosha Gmabino MD   bethanechol (URECHOLINE) 25 MG tablet Take 1 tablet by mouth 4 times daily  Tosha Gambino MD   Multiple Vitamin (MULTI VITAMIN DAILY) TABS Take 1 tablet by mouth daily Take 1 tab po daily  Apurva Patino MD   fluticasone (FLONASE) 50 MCG/ACT nasal spray 1 spray by Nasal route daily  Apurva Patino MD   hydrochlorothiazide (MICROZIDE) 12.5 MG capsule take 1 capsule by mouth once daily  Apurva Patino MD   Calcium Carbonate (CALCIUM 600 PO) Take 600 mg by mouth 2 times daily  Historical Provider, MD   loratadine (CLARITIN) 10 MG capsule Take 1 capsule by mouth daily  Boni Hess MD   potassium chloride (KLOR-CON M) 20 MEQ extended release tablet Take 1 tablet by mouth 2 times daily  Boni Hess MD   aspirin 81 MG chewable tablet Take 1 tablet by mouth daily  Boni Hess MD   budesonide-formoterol (SYMBICORT) 80-4.5 MCG/ACT AERO Inhale 2 puffs into the lungs 2 times daily  Aravind Adorno MD   nitroGLYCERIN (NITROSTAT) 0.4 MG SL tablet Place 1 tablet under the tongue every 5 minutes as needed for Chest pain  Cole Wilcox MD   aluminum & magnesium hydroxide-simethicone (MAALOX REGULAR STRENGTH) 200-200-20 MG/5ML SUSP suspension Take 30 mLs by mouth every 6 hours as needed for Jasmyne García MD   levothyroxine (SYNTHROID) 50 MCG tablet Take 1 tablet by mouth Daily  Boni Hess MD   donepezil (ARICEPT) 10 MG tablet Take 10 mg by mouth daily  Historical Provider, MD   simethicone (MYLICON) 483 MG chewable tablet Take 125 mg by mouth every 6 hours as needed for Flatulence  Historical Provider, MD   OXYGEN Inhale 2 L/min into the lungs nightly. Historical Provider, MD     Allergies   Allergen Reactions    Amlodipine Other (See Comments)     Pt says she got all the side effects      Sulfa Antibiotics     Cleocin [Clindamycin] Rash    Macrolides And Ketolides Rash     \"All mycins\"    Pcn [Penicillins] Rash    Tetracyclines & Related Rash    Zithromax [Azithromycin] Rash       Subjective:      Review of Systems   Constitutional:  Negative for activity change, appetite change, chills, fatigue and fever. Respiratory:  Negative for cough, chest tightness, shortness of breath and wheezing. Cardiovascular:  Negative for chest pain, palpitations and leg swelling. Gastrointestinal:  Negative for abdominal pain, constipation and diarrhea. Genitourinary:  Negative for difficulty urinating. Skin:  Negative for rash. Likely still has wounds on legs. Neurological:  Positive for headaches. Negative for dizziness, syncope, weakness and light-headedness. Psychiatric/Behavioral:  Positive for confusion and decreased concentration. Negative for behavioral problems, dysphoric mood and sleep disturbance. The patient is not nervous/anxious. Objective:     Physical Exam  Vitals and nursing note reviewed. Constitutional:       General: She is not in acute distress. Appearance: She is well-developed. Eyes:      Conjunctiva/sclera: Conjunctivae normal.   Neck:      Thyroid: No thyromegaly. Cardiovascular:      Rate and Rhythm: Normal rate and regular rhythm. Heart sounds: Normal heart sounds. No murmur heard. Pulmonary:      Effort: Pulmonary effort is normal. No respiratory distress. Breath sounds: Normal breath sounds. No wheezing. Abdominal:      General: Abdomen is flat. Bowel sounds are normal.      Palpations: Abdomen is soft. Tenderness: There is no abdominal tenderness. There is no guarding or rebound. Musculoskeletal:      Cervical back: Normal range of motion and neck supple. Lymphadenopathy:      Cervical: No cervical adenopathy. Skin:     General: Skin is warm and dry. Findings: No erythema or rash. Comments: Legs wrapped by laterally with ace wraps. Ace wraps are filthy covered in food and feces. Neurological:      General: No focal deficit present. Mental Status: She is alert. Mental status is at baseline. Psychiatric:         Mood and Affect: Mood normal.         Speech: Speech normal.         Behavior: Behavior normal.         Cognition and Memory: Memory is impaired. Assessment:       Diagnosis Orders   1. Mucopurulent chronic bronchitis ([facility] [address]        2. Essential hypertension        3. Paranoia ([facility] [address]        4. Late onset Alzheimer's disease without behavioral disturbance (HCC)                  Plan:   COPD. Stable.  She continues to be short of breath but overall she has not had any recent increase in cough or sputum. I will continue her current inhalers. Hypertension. Stable. Blood pressure has been well controlled. I will continue her current medications. Paranoia. Stable. She continues to be sure that people are stealing from her. She did get some candy for Alisa, and I suspect that she ate it all, but she is convinced that someone broke into her room and ate it. Dementia. Stable. Patient continues to be moderately confused. She does still remember a variety of things though she just gets easily confused and disoriented and she does not do well a change. Overall, still no issues with wandering and no major behavioral issues. I will continue to monitor. Return in about 1 month (around 1/27/2023) for COPD follow up. Patient given educational materials - see patient instructions. Discussed use, benefit, and side effects of prescribed medications. All patient questions answered. Patient voiced understanding. Reviewed health maintenance. Instructed to continue current medications, diet and exercise. Patient agreed with treatment plan. Follow up as directed. Alma Smith am personally transcribing for Edith Oakley MD 1/18/23 at 9:44 AM EST. Penelope Phoenix MD, personally performed the services described in this document as transcribed by the , and it is both accurate and complete.     Electronically signed by Edith Oakley MD on 1/19/23 at 8:28 AM EST

## 2023-01-23 ENCOUNTER — OUTSIDE SERVICES (OUTPATIENT)
Dept: FAMILY MEDICINE CLINIC | Age: 88
End: 2023-01-23
Payer: MEDICARE

## 2023-01-23 DIAGNOSIS — E04.1 THYROID NODULE: ICD-10-CM

## 2023-01-23 DIAGNOSIS — G30.1 LATE ONSET ALZHEIMER'S DISEASE WITHOUT BEHAVIORAL DISTURBANCE (HCC): ICD-10-CM

## 2023-01-23 DIAGNOSIS — F22 PARANOIA (HCC): ICD-10-CM

## 2023-01-23 DIAGNOSIS — I20.9 ANGINA PECTORIS, UNSPECIFIED (HCC): ICD-10-CM

## 2023-01-23 DIAGNOSIS — J41.1 MUCOPURULENT CHRONIC BRONCHITIS (HCC): Primary | ICD-10-CM

## 2023-01-23 DIAGNOSIS — F02.80 LATE ONSET ALZHEIMER'S DISEASE WITHOUT BEHAVIORAL DISTURBANCE (HCC): ICD-10-CM

## 2023-01-23 DIAGNOSIS — I50.9 CHRONIC HEART FAILURE, UNSPECIFIED HEART FAILURE TYPE (HCC): ICD-10-CM

## 2023-01-23 PROCEDURE — 99309 SBSQ NF CARE MODERATE MDM 30: CPT | Performed by: FAMILY MEDICINE

## 2023-02-21 ENCOUNTER — OUTSIDE SERVICES (OUTPATIENT)
Dept: FAMILY MEDICINE CLINIC | Age: 88
End: 2023-02-21
Payer: MEDICARE

## 2023-02-21 DIAGNOSIS — F02.B18 MODERATE LATE ONSET ALZHEIMER'S DEMENTIA WITH OTHER BEHAVIORAL DISTURBANCE (HCC): ICD-10-CM

## 2023-02-21 DIAGNOSIS — I10 ESSENTIAL HYPERTENSION: ICD-10-CM

## 2023-02-21 DIAGNOSIS — G30.1 MODERATE LATE ONSET ALZHEIMER'S DEMENTIA WITH OTHER BEHAVIORAL DISTURBANCE (HCC): ICD-10-CM

## 2023-02-21 DIAGNOSIS — F22 PARANOIA (HCC): ICD-10-CM

## 2023-02-21 DIAGNOSIS — J41.1 MUCOPURULENT CHRONIC BRONCHITIS (HCC): Primary | ICD-10-CM

## 2023-02-21 DIAGNOSIS — I71.20 THORACIC AORTIC ANEURYSM WITHOUT RUPTURE, UNSPECIFIED PART: ICD-10-CM

## 2023-02-21 PROCEDURE — 99309 SBSQ NF CARE MODERATE MDM 30: CPT | Performed by: FAMILY MEDICINE

## 2023-03-06 ASSESSMENT — ENCOUNTER SYMPTOMS
CHEST TIGHTNESS: 0
CONSTIPATION: 0
DIARRHEA: 0
SHORTNESS OF BREATH: 1
ABDOMINAL PAIN: 0
COUGH: 0
WHEEZING: 0

## 2023-03-06 NOTE — PROGRESS NOTES
[facility] [address]  [address] [address]  Dept: [phone]  Dept Fax: [phone]  Loc: [phone]    Pricila Lord  is a 80 y.o. female who presents today for her medical conditions/complaints as noted below. Pricila Lord is c/o of     Chief Complaint   Patient presents with    COPD    Anxiety    Headache    Dementia    Other     Thyroid nodule       HPI:   Janett Nichols is being seen for her regular monthly follow up while at [facility]. Overall, patient has been doing OK. Nursing staff has had some concerns about her behaviors recently. She has had a lot more aggressive behaviors and a lot more disruptive behaviors especially in the evening. Patient has been shouting, yelling, throwing some small objects, and saying that everyone is stealing her things, which is causing a lot of distress to the people around her and the nursing staff. It has also been noticed that patient has a swelling in her lower neck right around the clavicles and it was brought to my attention by nursing staff a couple of weeks ago so I did order an ultrasound that did show several large thyroid nodules. I mentioned this to the patient during my visit that she had several nodules and asked if she would be interested in a thyroid biopsy to see if there is anything concerning with these nodules, and her exact answer was absolutely not would I put myself through anything like that I have already had plenty of tests in my life. Patient is not having any trouble swallowing and no choking episodes with meals. Her TSH was normal. I have no concerns about not doing a biopsy if that is what she wants. Patient has not had any problems with her rash, that seems to have fully been improving the more that we were able to keep her hands off of her arms and legs. She still has a great appetite.  Patient still complains of shortness of breath on a semi regular basis, but this is chronic due to her COPD. No issues with chest pains. No headaches that are out of the ordinary, as she does have chronic daily headaches from sinusitis. She did not mention any sinus congestion at this visit which is unusual for her. 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 TC, RRT 4/20/18   Substance Use Topics    Alcohol use: No     Alcohol/week: 0.0 standard drinks      Prior to Visit Medications    Medication Sig Taking?  Authorizing Provider   isosorbide mononitrate (IMDUR) 60 MG extended release tablet Take 1 tablet by mouth daily  Fransico Costello MD   metoprolol tartrate (LOPRESSOR) 50 MG tablet Take 1 tablet by mouth 2 times daily  Fransico Costello MD   furosemide (LASIX) 40 MG tablet Take 1 tablet by mouth daily  Fransico Costello MD   sodium chloride 1 g tablet Take 1 tablet by mouth 2 times daily (with meals)  Fransico Costello MD   busPIRone (BUSPAR) 7.5 MG tablet Take 1 tablet by mouth nightly  Fransico Costello MD   busPIRone (BUSPAR) 10 MG tablet Take 1 tablet by mouth every morning  Fransico Costello MD   hydrOXYzine pamoate (VISTARIL) 25 MG capsule Take 1 capsule by mouth nightly as needed for Anxiety  Radha MD Fareed   mirtazapine (REMERON) 7.5 MG tablet Take 1 tablet by mouth nightly  Radha Guerrier MD   PARoxetine (PAXIL) 10 MG tablet Take 1 tablet by mouth nightly  Radha Guerrier MD   acetaminophen (TYLENOL) 500 MG tablet Take 500 mg by mouth every 6 hours as needed for Pain  Historical Provider, MD   loperamide (IMODIUM) 2 MG capsule Take 2 mg by mouth 3 times daily as needed for Diarrhea  Historical Provider, MD   Acetylcysteine, Nutrient, 600 MG TABS Take 2 tablets by mouth in the morning and at bedtime Indications: Neurotic Excoriation  Historical Provider, MD   vitamin D3 (CHOLECALCIFEROL) 25 MCG (1000 UT) TABS tablet Take 4,000 Units by mouth daily Indications: Supplement  Historical Provider, MD   diphenhydrAMINE (BENADRYL) 25 MG tablet Take 25 mg by mouth every 6 hours as needed for Itching  Historical Provider, MD   Magic Mouthwash (MIRACLE MOUTHWASH) Swish and spit 10 mLs 4 times daily as needed (MOUTH PAIN) 1:1 Lidocaine,Nystatin,Diphenhydramine swish and spit.   Historical Provider, MD   camphor-menthol (SARNA) 0.5-0.5 % lotion Apply to effected areas topically at bedtime for itching  Historical Provider, MD   albuterol (PROVENTIL) (2.5 MG/3ML) 0.083% nebulizer solution Take 2.5 mg by nebulization 2 times daily  Historical Provider, MD   benzonatate (TESSALON) 200 MG capsule Take 200 mg by mouth 3 times daily as needed for Cough  Historical Provider, MD   guaiFENesin (ROBITUSSIN) 100 MG/5ML syrup Take 10 mLs by mouth 4 times daily as needed for Cough or Congestion  Historical Provider, MD   lisinopril (PRINIVIL;ZESTRIL) 5 MG tablet Take 1 tablet by mouth daily  Radha Guerrier MD   bethanechol (URECHOLINE) 25 MG tablet Take 1 tablet by mouth 4 times daily  Radha Guerrier MD   Multiple Vitamin (MULTI VITAMIN DAILY) TABS Take 1 tablet by mouth daily Take 1 tab po daily  Humera Wilks MD   fluticasone (FLONASE) 50 MCG/ACT nasal spray 1 spray by Nasal route daily  [facility] MD Duarte   hydrochlorothiazide (MICROZIDE) 12.5 MG capsule take 1 capsule by mouth once daily  Hunter Andres MD   Calcium Carbonate (CALCIUM 600 PO) Take 600 mg by mouth 2 times daily  Historical Provider, MD   loratadine (CLARITIN) 10 MG capsule Take 1 capsule by mouth daily  Hunter Andres MD   potassium chloride (KLOR-CON M) 20 MEQ extended release tablet Take 1 tablet by mouth 2 times daily  Hunter Andres MD   aspirin 81 MG chewable tablet Take 1 tablet by mouth daily  Hunter Andres MD   budesonide-formoterol (SYMBICORT) 80-4.5 MCG/ACT AERO Inhale 2 puffs into the lungs 2 times daily  Jigna Ibry MD   nitroGLYCERIN (NITROSTAT) 0.4 MG SL tablet Place 1 tablet under the tongue every 5 minutes as needed for Chest pain  Janine Davalos MD   aluminum & magnesium hydroxide-simethicone (MAALOX REGULAR STRENGTH) 200-200-20 MG/5ML SUSP suspension Take 30 mLs by mouth every 6 hours as needed for Natividadmaeve Lowery MD   levothyroxine (SYNTHROID) 50 MCG tablet Take 1 tablet by mouth Daily  Hunter Andres MD   donepezil (ARICEPT) 10 MG tablet Take 10 mg by mouth daily  Historical Provider, MD   simethicone (MYLICON) 855 MG chewable tablet Take 125 mg by mouth every 6 hours as needed for Flatulence  Historical Provider, MD   OXYGEN Inhale 2 L/min into the lungs nightly. Historical Provider, MD     Allergies   Allergen Reactions    Amlodipine Other (See Comments)     Pt says she got all the side effects      Sulfa Antibiotics     Cleocin [Clindamycin] Rash    Macrolides And Ketolides Rash     \"All mycins\"    Pcn [Penicillins] Rash    Tetracyclines & Related Rash    Zithromax [Azithromycin] Rash       Subjective:      Review of Systems   Constitutional:  Negative for activity change, appetite change, chills, fatigue and fever. Respiratory:  Positive for shortness of breath. Negative for cough, chest tightness and wheezing.     Cardiovascular:  Negative for chest pain, palpitations and leg swelling.   Gastrointestinal:  Negative for abdominal pain, constipation and diarrhea.   Endocrine:        Enlarged thyroid.    Genitourinary:  Negative for difficulty urinating.   Skin:  Negative for rash.   Neurological:  Positive for headaches. Negative for dizziness, syncope, weakness and light-headedness.   Psychiatric/Behavioral:  Positive for agitation, confusion and decreased concentration. Negative for behavioral problems, dysphoric mood and sleep disturbance. The patient is nervous/anxious.      Objective:     Physical Exam  Vitals and nursing note reviewed.   Constitutional:       General: She is not in acute distress.     Appearance: She is well-developed.   HENT:      Head:      Comments: Face appears swollen.   Eyes:      Conjunctiva/sclera: Conjunctivae normal.   Neck:      Thyroid: Thyromegaly present.   Cardiovascular:      Rate and Rhythm: Normal rate and regular rhythm.      Heart sounds: Normal heart sounds. No murmur heard.  Pulmonary:      Effort: Pulmonary effort is normal. No respiratory distress.      Breath sounds: Normal breath sounds. No wheezing.   Abdominal:      General: Abdomen is flat. Bowel sounds are normal.      Palpations: Abdomen is soft.      Tenderness: There is no abdominal tenderness. There is no guarding or rebound.   Musculoskeletal:      Cervical back: Normal range of motion and neck supple.   Lymphadenopathy:      Cervical: No cervical adenopathy.   Skin:     General: Skin is warm and dry.      Findings: No erythema or rash.   Neurological:      General: No focal deficit present.      Mental Status: She is alert. Mental status is at baseline.   Psychiatric:         Mood and Affect: Mood normal.         Speech: Speech normal.         Behavior: Behavior normal.         Cognition and Memory: Memory is impaired.         Assessment:       Diagnosis Orders   1. Mucopurulent chronic bronchitis (HCC)        2. Thyroid nodule        3. Late onset Alzheimer's disease without  behavioral disturbance ([facility] [address]        4. Chronic heart failure, unspecified heart failure type ([address]        5. Paranoia ([address]        6. Angina pectoris, unspecified ([facility] [address]                  Plan:   COPD. Stable. Breathing has been pretty normal for the last several months. No recent increase in cough or shortness of breath. Overall, she has been doing very well. I will continue her current inhalers. Thyroid nodule. New. This was found on ultrasound after noticing that her thyroid appeared quite enlarged. I did discuss this with the patient and as above she is not interested in a thyroid biopsy, therefore I will defer that at this time. Her face is also quite swollen which I wonder if that has something to do with her enlarged thyroid, but her TSH is normal. Since she does not want to do a biopsy there is not much else that I will do for IT at this point. If she develops any problems swallowing, or any sort of airway compromise I will of course reevaluate. Dementia. Worsening. Patient has been having a lot more outbursts at night. She seems a lot more anxious, and overall a lot more agitated in the evenings. To try to help with this I increased her Remeron to 15mg nightly and I increased her Buspar dose to 10mg nightly. CHF. Stable. No recent issues with that. She has not had any swelling in her legs. No signs of fluid overload. Paranoia. Worsening. As above, the dementia is definitely worsening so she is becoming much more paranoid that people are stealing from her. Hopefully the increased dose of Remeron and Buspar will help. Angina. Stable. Patient has not had any problems with chest pains in quite a while. She tends to get those when she is more anxious or does not want to do something, but again this has not been a problem in over a year. Return in about 1 month (around 2/23/2023) for COPD follow up. Patient given educational materials - see patient instructions.   Discussed use, benefit, and side effects of prescribed medications. All patient questions answered. Patient voiced understanding. Reviewed health maintenance. Instructed to continue current medications, diet and exercise. Patient agreed with treatment plan. Follow up as directed. Marybeth Rodrigues am personally transcribing for Tana Barry MD 3/6/23 at 5:42 PM EST. Serina Meckel, MD, personally performed the services described in this document as transcribed by the , and it is both accurate and complete.     Electronically signed by Tana Barry MD on 3/7/23 at 12:15 PM EST

## 2023-03-15 ASSESSMENT — ENCOUNTER SYMPTOMS
CONSTIPATION: 0
DIARRHEA: 0
SHORTNESS OF BREATH: 1
WHEEZING: 0
ABDOMINAL PAIN: 0
CHEST TIGHTNESS: 0
COUGH: 0

## 2023-03-15 NOTE — PROGRESS NOTES
VERONICA [facility] [place] [address]  Dept: [phone]  Dept Fax: [phone]  Loc: [phone]    Migdalia Hampton  is a 80 y.o. female who presents today for her medical conditions/complaints as noted below. Migdalia Hampton is c/o of     Chief Complaint   Patient presents with    Anxiety    COPD    Hypertension    Dementia       HPI:   Gretchen Balderas is being seen for her regular monthly follow up while at [facility]. Overall, patient has been doing relatively well. Nursing staff did not have any concerns about her. She really has not had any behavioral changes in the last several months. No issues with aggressive behaviors. No wandering behaviors. Patient continues to be a little paranoid and she continues to think that people are stealing from her, but this has been going on since she got here six years ago so it is nothing new. Patient reports that she is eating well, and nursing staff did not have any concerns about her appetite. In her face mostly in her neck and chin. I suspect that this is from her thyroid, but she still does not want to have anything done about her thyroid and she reports no issues with swallowing or breathing. At this point, I will not do anything about it. Not had any problems with chest pains or cough. She is chronically short of breath due to her COPD, but that has not really worsened in the last several years. Patient always has a headache as well due to chronic sinus issues and the weather and that has not gotten any worse lately. No issues with constipation or diarrhea. No regular abdominal pain. Overall, she really has not had any significant changes in the last month.     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 TC, RRT 4/20/18   Substance Use Topics    Alcohol use: No     Alcohol/week: 0.0 standard drinks      Prior to Visit Medications    Medication Sig Taking?  Authorizing Provider   isosorbide mononitrate (IMDUR) 60 MG extended release tablet Take 1 tablet by mouth daily  Jamie Higgins MD   metoprolol tartrate (LOPRESSOR) 50 MG tablet Take 1 tablet by mouth 2 times daily  Jamie Higgins MD   furosemide (LASIX) 40 MG tablet Take 1 tablet by mouth daily  Jamie Higgins MD   sodium chloride 1 g tablet Take 1 tablet by mouth 2 times daily (with meals)  Jamie Higgins MD   busPIRone (BUSPAR) 7.5 MG tablet Take 1 tablet by mouth nightly  Jamie Higgins MD   busPIRone (BUSPAR) 10 MG tablet Take 1 tablet by mouth every morning  Jamie Higgins MD   hydrOXYzine pamoate (VISTARIL) 25 MG capsule Take 1 capsule by mouth nightly as needed for Anxiety  Jamie Higgins MD   mirtazapine (REMERON) 7.5 MG tablet Take 1 tablet by mouth nightly  Jamie Higgins MD   PARoxetine (PAXIL) 10 MG tablet Take 1 tablet by mouth nightly  Jamie Higgins MD   acetaminophen (TYLENOL) 500 MG tablet Take 500 mg by mouth every 6 hours as needed for Pain  Historical Provider, MD   loperamide (IMODIUM) 2 MG capsule Take 2 mg by mouth 3 times daily as needed for Diarrhea  Historical Provider, MD   Acetylcysteine, Nutrient, 600 MG TABS Take 2 tablets by mouth in the morning and at bedtime Indications: Neurotic Excoriation  Historical Provider, MD   vitamin D3 (CHOLECALCIFEROL) 25 MCG (1000 UT) TABS tablet Take 4,000 Units by mouth daily Indications: Supplement  Historical Provider, MD   diphenhydrAMINE (BENADRYL) 25 MG tablet Take 25 mg by mouth every 6 hours as needed for Itching  Historical Provider, MD   Magic Mouthwash (MIRACLE MOUTHWASH) Swish and spit 10 mLs 4 times daily as needed (MOUTH PAIN) 1:1 Lidocaine,Nystatin,Diphenhydramine swish and spit.   Historical Provider, MD   camphor-menthol (SARNA) 0.5-0.5 % lotion Apply to effected areas topically at bedtime for itching  Historical Provider, MD   albuterol (PROVENTIL) (2.5 MG/3ML) 0.083% nebulizer solution Take 2.5 mg by nebulization 2 times daily  Historical Provider, MD   benzonatate (TESSALON) 200 MG capsule Take 200 mg by mouth 3 times daily as needed for Cough  Historical Provider, MD   guaiFENesin (ROBITUSSIN) 100 MG/5ML syrup Take 10 mLs by mouth 4 times daily as needed for Cough or Congestion  Historical Provider, MD   lisinopril (PRINIVIL;ZESTRIL) 5 MG tablet Take 1 tablet by mouth daily  Syed Curran MD   bethanechol (URECHOLINE) 25 MG tablet Take 1 tablet by mouth 4 times daily  Syed Curran MD   Multiple Vitamin (MULTI VITAMIN DAILY) TABS Take 1 tablet by mouth daily Take 1 tab po daily  Dimitry Sal MD   fluticasone (FLONASE) 50 MCG/ACT nasal spray 1 spray by Nasal route daily  Dimitry Sal MD   hydrochlorothiazide (MICROZIDE) 12.5 MG capsule take 1 capsule by mouth once daily  Dimitry Sal MD   Calcium Carbonate (CALCIUM 600 PO) Take 600 mg by mouth 2 times daily  Historical Provider, MD   loratadine (CLARITIN) 10 MG capsule Take 1 capsule by mouth daily  Dimitry Sal MD   potassium chloride (KLOR-CON M) 20 MEQ extended release tablet Take 1 tablet by mouth 2 times daily  Dimitry Sal MD   aspirin 81 MG chewable tablet Take 1 tablet by mouth daily  Herson Ramachandran MD   budesonide-formoterol (SYMBICORT) 80-4.5 MCG/ACT AERO Inhale 2 puffs into the lungs 2 times daily  Rebel Leija MD   nitroGLYCERIN (NITROSTAT) 0.4 MG SL tablet Place 1 tablet under the tongue every 5 minutes as needed for Chest pain  Chavo Otoniel Alvarez MD   aluminum & magnesium hydroxide-simethicone (MAALOX REGULAR STRENGTH) 200-200-20 MG/5ML SUSP suspension Take 30 mLs by mouth every 6 hours as needed for Indigestion  Herson Ramachandran MD   levothyroxine (SYNTHROID) 50 MCG tablet Take 1 tablet by mouth Daily  Herson Ramachandran MD   donepezil (ARICEPT) 10 MG tablet Take 10 mg by mouth daily  Historical Provider, MD   simethicone (MYLICON) 125 MG chewable tablet Take 125 mg by mouth every 6 hours as needed for Flatulence  Historical Provider, MD   OXYGEN Inhale 2 L/min into the lungs nightly.  Historical Provider, MD     Allergies   Allergen Reactions    Amlodipine Other (See Comments)     Pt says she got all the side effects      Sulfa Antibiotics     Cleocin [Clindamycin] Rash    Macrolides And Ketolides Rash     \"All mycins\"    Pcn [Penicillins] Rash    Tetracyclines & Related Rash    Zithromax [Azithromycin] Rash       Subjective:      Review of Systems   Constitutional:  Negative for activity change, appetite change, chills, fatigue and fever.   Respiratory:  Positive for shortness of breath. Negative for cough, chest tightness and wheezing.    Cardiovascular:  Negative for chest pain, palpitations and leg swelling.   Gastrointestinal:  Negative for abdominal pain, constipation and diarrhea.   Genitourinary:  Negative for difficulty urinating.   Skin:  Negative for rash.   Neurological:  Positive for headaches. Negative for dizziness, syncope, weakness and light-headedness.   Psychiatric/Behavioral:  Positive for confusion and decreased concentration. Negative for behavioral problems, dysphoric mood and sleep disturbance. The patient is not nervous/anxious.      Objective:  Physical Exam  Vitals and nursing note reviewed. Constitutional:       General: She is not in acute distress. Appearance: She is well-developed. HENT:      Head:      Comments: Face is swollen, specifically around the chin. Eyes:      Conjunctiva/sclera: Conjunctivae normal.   Neck:      Thyroid: Thyromegaly present. Cardiovascular:      Rate and Rhythm: Normal rate and regular rhythm. Heart sounds: Normal heart sounds. No murmur heard. Pulmonary:      Effort: Pulmonary effort is normal. No respiratory distress. Breath sounds: Normal breath sounds. No wheezing. Abdominal:      General: Abdomen is flat. Bowel sounds are normal.      Palpations: Abdomen is soft. Tenderness: There is no abdominal tenderness. There is no guarding or rebound. Musculoskeletal:      Cervical back: Normal range of motion and neck supple. Lymphadenopathy:      Cervical: No cervical adenopathy. Skin:     General: Skin is warm and dry. Findings: No erythema or rash. Neurological:      General: No focal deficit present. Mental Status: She is alert. Mental status is at baseline. Psychiatric:         Mood and Affect: Mood normal.         Speech: Speech normal.         Behavior: Behavior normal.         Cognition and Memory: Memory is impaired. Assessment:       Diagnosis Orders   1. Mucopurulent chronic bronchitis ([address]        2. Moderate late onset Alzheimer's dementia with other behavioral disturbance ([address]        3. Paranoia ([address]        4. Essential hypertension        5. Thoracic aortic aneurysm without rupture, unspecified part                  Plan:   COPD. Stable. No recent changes in her breathing and overall she has been doing OK. I will continue to monitor. Dementia. Stable. Patient has not had any changes in behaviors and no real decline in memory. For now, I will just continue to monitor. Paranoia. Stable.  Patient is still doing well on her Paxil and Buspar as needed for anxiety. Nothing has really worsened. I will continue her current doses of medications. Hypertension. Stable. Blood pressure has been well controlled. No problems with hypotension with transfers. Her blood pressure has not been elevated in quite a while. Thoracic aortic aneurysm. Stable. No issues with this. At this time, patient declines follow up. Return in about 1 month (around 3/21/2023) for COPD follow up. Patient given educational materials - see patient instructions. Discussed use, benefit, and side effects of prescribed medications. All patient questions answered. Patient voiced understanding. Reviewed health maintenance. Instructed to continue current medications, diet and exercise. Patient agreed with treatment plan. Follow up as directed. Latrell De La Garza am personally transcribing for Khushbu Gonzalez MD 3/15/23 at 3:46 PM EDT. Rafael Quevedo MD, personally performed the services described in this document as transcribed by the , and it is both accurate and complete.     Electronically signed by Khushbu Gonzalez MD on 3/16/23 at 5:41 PM EDT

## 2023-03-24 ENCOUNTER — TELEPHONE (OUTPATIENT)
Dept: FAMILY MEDICINE CLINIC | Age: 88
End: 2023-03-24

## 2023-03-24 RX ORDER — BUSPIRONE HYDROCHLORIDE 10 MG/1
10 TABLET ORAL NIGHTLY
COMMUNITY

## 2023-03-24 RX ORDER — PREDNISONE 20 MG/1
2 TABLET ORAL DAILY
COMMUNITY
Start: 2023-02-25

## 2023-03-24 RX ORDER — MIRTAZAPINE 15 MG/1
15 TABLET, FILM COATED ORAL NIGHTLY
COMMUNITY
Start: 2023-02-26

## 2023-04-25 ENCOUNTER — OUTSIDE SERVICES (OUTPATIENT)
Dept: PRIMARY CARE CLINIC | Age: 88
End: 2023-04-25

## 2023-04-25 DIAGNOSIS — F02.80 LATE ONSET ALZHEIMER'S DISEASE WITHOUT BEHAVIORAL DISTURBANCE (HCC): ICD-10-CM

## 2023-04-25 DIAGNOSIS — J32.4 CHRONIC PANSINUSITIS: ICD-10-CM

## 2023-04-25 DIAGNOSIS — I10 ESSENTIAL HYPERTENSION: ICD-10-CM

## 2023-04-25 DIAGNOSIS — E04.1 THYROID NODULE: ICD-10-CM

## 2023-04-25 DIAGNOSIS — F22 PARANOIA (HCC): ICD-10-CM

## 2023-04-25 DIAGNOSIS — G30.1 LATE ONSET ALZHEIMER'S DISEASE WITHOUT BEHAVIORAL DISTURBANCE (HCC): ICD-10-CM

## 2023-04-25 DIAGNOSIS — J41.1 MUCOPURULENT CHRONIC BRONCHITIS (HCC): Primary | ICD-10-CM

## 2023-04-25 DIAGNOSIS — I50.22 CHRONIC SYSTOLIC CHF (CONGESTIVE HEART FAILURE) (HCC): ICD-10-CM

## 2023-04-26 ENCOUNTER — TELEPHONE (OUTPATIENT)
Dept: FAMILY MEDICINE CLINIC | Age: 88
End: 2023-04-26

## 2023-04-26 RX ORDER — QUETIAPINE FUMARATE 25 MG/1
25 TABLET, FILM COATED ORAL
COMMUNITY
Start: 2023-04-22

## 2023-04-27 LAB
BUN BLDV-MCNC: 13 MG/DL
CALCIUM SERPL-MCNC: 9.4 MG/DL
CHLORIDE BLD-SCNC: 103 MMOL/L
CO2: 27 MMOL/L
CREAT SERPL-MCNC: 0.9 MG/DL
EGFR: 59
GLUCOSE BLD-MCNC: 103 MG/DL
POTASSIUM SERPL-SCNC: 4.4 MMOL/L
SODIUM BLD-SCNC: 140 MMOL/L
T4 FREE: 1.2
TSH SERPL DL<=0.05 MIU/L-ACNC: 0.16 UIU/ML

## 2023-05-26 PROBLEM — F02.80 LATE ONSET ALZHEIMER'S DISEASE WITHOUT BEHAVIORAL DISTURBANCE (HCC): Status: ACTIVE | Noted: 2023-05-26

## 2023-05-26 PROBLEM — E04.1 THYROID NODULE: Status: ACTIVE | Noted: 2023-05-26

## 2023-05-26 PROBLEM — G30.1 LATE ONSET ALZHEIMER'S DISEASE WITHOUT BEHAVIORAL DISTURBANCE (HCC): Status: ACTIVE | Noted: 2023-05-26

## 2023-05-26 ASSESSMENT — ENCOUNTER SYMPTOMS
COUGH: 0
CONSTIPATION: 0
WHEEZING: 0
ABDOMINAL PAIN: 0
FACIAL SWELLING: 1
SHORTNESS OF BREATH: 0
DIARRHEA: 0
CHEST TIGHTNESS: 0

## 2023-05-26 NOTE — PROGRESS NOTES
[place] [address] [place] [address] WALK IN DEPARTMENT OF [place] [address]  [address] [address]  Dept: [phone]  Dept Fax: [phone]    Aden Ortiz  is a 80 y.o. female who presents today for her medical conditions/complaints as noted below. Aden Ortiz is c/o of     Chief Complaint   Patient presents with    Cough    Hypertension    Anxiety    Headache       HPI:   Ann-Marie Core is being seen for her regular monthly follow up while at [facility]. Overall, patient says she has been doing very well. No recent changes. Nursing staff did not have any new concerns about her at all that they had mentioned. I notice she continues to have significant swelling of her face which I am attributing to her thyroid. As patient has a very large thyroid that is seen in her neck. When I asked her if it was bothering her and if she has any problems with vision, swallowing, or anything because of the edema she assures me that she does not. I asked her if she spends a lot of time lying in bed because she used to spend most of her time sitting up in the lobby, but I have not seen her out in the lobby in the last several months. Patient said that she does spend all of her time laying down in bed and I suspect some of that is dependent edema then. I told her it would be better if her head was elevated. She seems open to the idea of elevating the head of her bed so I will go ahead and put an order in for that because it will certainly help if she can keep her head up so that the swelling does not get worse. She continues to not want to do anything with her thyroid as far as biopsy or possible removal. I do think that is appropriate just because removing it I think would be a very major surgery that she potentially would even need to go to [facility] for and I am not sure that she is strong enough to survived that.  Otherwise, she denies any

## 2023-06-13 ENCOUNTER — OUTSIDE SERVICES (OUTPATIENT)
Dept: FAMILY MEDICINE CLINIC | Age: 88
End: 2023-06-13

## 2023-06-13 DIAGNOSIS — G30.1 MODERATE LATE ONSET ALZHEIMER'S DEMENTIA WITH OTHER BEHAVIORAL DISTURBANCE (HCC): ICD-10-CM

## 2023-06-13 DIAGNOSIS — F22 PARANOIA (HCC): ICD-10-CM

## 2023-06-13 DIAGNOSIS — I10 ESSENTIAL HYPERTENSION: ICD-10-CM

## 2023-06-13 DIAGNOSIS — J41.1 MUCOPURULENT CHRONIC BRONCHITIS (HCC): Primary | ICD-10-CM

## 2023-06-13 DIAGNOSIS — E04.1 THYROID NODULE: ICD-10-CM

## 2023-06-13 DIAGNOSIS — F02.B18 MODERATE LATE ONSET ALZHEIMER'S DEMENTIA WITH OTHER BEHAVIORAL DISTURBANCE (HCC): ICD-10-CM

## 2023-07-11 ENCOUNTER — OUTSIDE SERVICES (OUTPATIENT)
Dept: FAMILY MEDICINE CLINIC | Age: 88
End: 2023-07-11

## 2023-07-11 DIAGNOSIS — F02.B18 MODERATE LATE ONSET ALZHEIMER'S DEMENTIA WITH OTHER BEHAVIORAL DISTURBANCE (HCC): ICD-10-CM

## 2023-07-11 DIAGNOSIS — I10 ESSENTIAL HYPERTENSION: ICD-10-CM

## 2023-07-11 DIAGNOSIS — J41.1 MUCOPURULENT CHRONIC BRONCHITIS (HCC): Primary | ICD-10-CM

## 2023-07-11 DIAGNOSIS — G30.1 MODERATE LATE ONSET ALZHEIMER'S DEMENTIA WITH OTHER BEHAVIORAL DISTURBANCE (HCC): ICD-10-CM

## 2023-07-11 DIAGNOSIS — E04.1 THYROID NODULE: ICD-10-CM

## 2023-07-11 DIAGNOSIS — F22 PARANOIA (HCC): ICD-10-CM

## 2023-07-12 ENCOUNTER — TELEPHONE (OUTPATIENT)
Dept: FAMILY MEDICINE CLINIC | Age: 88
End: 2023-07-12

## 2023-07-12 RX ORDER — METHIMAZOLE 5 MG/1
5 TABLET ORAL 3 TIMES DAILY
COMMUNITY

## 2023-07-13 ASSESSMENT — ENCOUNTER SYMPTOMS
WHEEZING: 0
SINUS PAIN: 1
CHEST TIGHTNESS: 0
CONSTIPATION: 0
ABDOMINAL PAIN: 0
SHORTNESS OF BREATH: 0
DIARRHEA: 0
COUGH: 0
FACIAL SWELLING: 1

## 2023-07-13 NOTE — PROGRESS NOTES
[address]  [address] [address]  Dept: [phone]  Dept Fax: [phone]  Loc: [phone]    Richa Andrews  is a 80 y.o. female who presents today for her medical conditions/complaints as noted below. Richa Andrews is c/o of     Chief Complaint   Patient presents with    Other     Swelling of thryoid    Facial Swelling    Cough    Anxiety    Hypertension    Dementia       HPI:   Sheridan Rosas is being seen for her regular monthly follow up while at [facility]. Overall, patient has been doing very well. She does not have any specific concerns and nursing staff did not either. The biggest continued problem that she has is that her face is very swollen. I still suspect this is due to her thyroid but now that she is keeping her head of her bed elevated she has no more pitting edema in her face and she does not seem bothered at all by the swelling. Patient is not having any trouble breathing, no trouble swallowing, no trouble eating, and she said her appetite remains fantastic. Patient has not had any recent changes in behaviors. No increased confusion or behavioral disturbances. She is sleeping well. Patient continues to complain of chronic sinus pain, chronic sinus pressure, and chronic sinus headaches. She is also a little bit short of breath because it is kind of humid today, but that is really baseline for her. No recent change in cough. No sputum production. Patient has not had any problems with chest pains at all. She denies any recent issues with her skin. The rash that she had on her arms and legs finally seems to have cleared up.     Past Medical History:   Diagnosis Date    Arthritis     Chronic kidney disease     h/o mild renal insufficency    COPD (chronic obstructive pulmonary disease) (HCC)     Dyspnea     chronic    Hypertension     Hyponatremia     Left bundle branch block     chronic, old

## 2023-08-08 ENCOUNTER — OUTSIDE SERVICES (OUTPATIENT)
Dept: PRIMARY CARE CLINIC | Age: 88
End: 2023-08-08
Payer: MEDICARE

## 2023-08-08 DIAGNOSIS — I10 ESSENTIAL HYPERTENSION: ICD-10-CM

## 2023-08-08 DIAGNOSIS — F22 PARANOIA (HCC): ICD-10-CM

## 2023-08-08 DIAGNOSIS — J41.1 MUCOPURULENT CHRONIC BRONCHITIS (HCC): Primary | ICD-10-CM

## 2023-08-08 DIAGNOSIS — G30.1 LATE ONSET ALZHEIMER'S DISEASE WITHOUT BEHAVIORAL DISTURBANCE (HCC): ICD-10-CM

## 2023-08-08 DIAGNOSIS — F02.80 LATE ONSET ALZHEIMER'S DISEASE WITHOUT BEHAVIORAL DISTURBANCE (HCC): ICD-10-CM

## 2023-08-08 PROCEDURE — 99309 SBSQ NF CARE MODERATE MDM 30: CPT | Performed by: FAMILY MEDICINE

## 2023-08-08 ASSESSMENT — ENCOUNTER SYMPTOMS
CONSTIPATION: 0
FACIAL SWELLING: 1
WHEEZING: 0
SHORTNESS OF BREATH: 1
ABDOMINAL PAIN: 0
CHEST TIGHTNESS: 0
COUGH: 0
DIARRHEA: 0
SINUS PRESSURE: 1

## 2023-08-08 ASSESSMENT — PATIENT HEALTH QUESTIONNAIRE - PHQ9
SUM OF ALL RESPONSES TO PHQ QUESTIONS 1-9: 0
SUM OF ALL RESPONSES TO PHQ QUESTIONS 1-9: 0
1. LITTLE INTEREST OR PLEASURE IN DOING THINGS: 0
SUM OF ALL RESPONSES TO PHQ QUESTIONS 1-9: 0
SUM OF ALL RESPONSES TO PHQ QUESTIONS 1-9: 0
SUM OF ALL RESPONSES TO PHQ9 QUESTIONS 1 & 2: 0
2. FEELING DOWN, DEPRESSED OR HOPELESS: 0

## 2023-08-08 NOTE — PROGRESS NOTES
dizziness, syncope, weakness, light-headedness and headaches. Psychiatric/Behavioral:  Positive for confusion and decreased concentration. Negative for behavioral problems, dysphoric mood and sleep disturbance. The patient is not nervous/anxious. Objective:     Physical Exam  Vitals and nursing note reviewed. Constitutional:       General: She is not in acute distress. Appearance: She is well-developed. HENT:      Head:      Comments: Face is swollen symmetrically. No impaired breathing. Eyes:      Conjunctiva/sclera: Conjunctivae normal.   Neck:      Thyroid: Thyromegaly present. Cardiovascular:      Rate and Rhythm: Normal rate and regular rhythm. Heart sounds: Normal heart sounds. No murmur heard. Pulmonary:      Effort: Pulmonary effort is normal. No respiratory distress. Breath sounds: Normal breath sounds. No wheezing. Abdominal:      General: Abdomen is flat. Bowel sounds are normal.      Palpations: Abdomen is soft. Tenderness: There is no abdominal tenderness. There is no guarding or rebound. Musculoskeletal:      Cervical back: Normal range of motion and neck supple. Lymphadenopathy:      Cervical: No cervical adenopathy. Skin:     General: Skin is warm and dry. Findings: No erythema or rash. Neurological:      General: No focal deficit present. Mental Status: She is alert. Mental status is at baseline. Psychiatric:         Mood and Affect: Mood normal.         Speech: Speech normal.         Behavior: Behavior normal.         Cognition and Memory: Memory is impaired. Assessment:       Diagnosis Orders   1. Mucopurulent chronic bronchitis ([address])        2. 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 Paranoia ([address])        4. Essential hypertension        5. Thyroid nodule                  Plan:   COPD. Stable. Breathing has been well controlled.  No recent issues with wheezing or cough and no

## 2023-08-09 ENCOUNTER — TELEPHONE (OUTPATIENT)
Dept: FAMILY MEDICINE CLINIC | Age: 88
End: 2023-08-09

## 2023-08-15 ASSESSMENT — ENCOUNTER SYMPTOMS
CONSTIPATION: 0
ABDOMINAL PAIN: 0
CHEST TIGHTNESS: 0
SINUS PRESSURE: 1
DIARRHEA: 0
WHEEZING: 0
SHORTNESS OF BREATH: 1
COUGH: 0

## 2023-08-15 NOTE — PROGRESS NOTES
[place] [address] [facility] [place] WALK IN DEPARTMENT OF [address]  [address] [address]  Dept: [phone]  Dept Fax: [phone]    Sabrina Le  is a 80 y.o. female who presents today for her medical conditions/complaints as noted below. Sabrina Le is c/o of     Chief Complaint   Patient presents with    Anxiety    Cough    Hypertension    Sinusitis    Dementia       HPI:   Chris Stephenson is being seen for her regular monthly follow up while at [facility]. Overall, patient is doing fine. I do feel like just with my visits she had been declining over the last year or so in regard to her dementia. She used to be quite chatty and awake all the time when I would come in to visit and now she sleeps most of the morning. Patient is still very pleasant with me, but she is not very chatty and she does not tell me stories like she used to. She continues to complain of shortness of breath but nothing has worse than recently. She has not had any problems with cough. Patient said her sinuses are still the same as always. She continues to have a lot of sinus pressure and she has a chronic headache. She has not had any problems with abdominal pain, constipation, or diarrhea. Her appetite is still excellent. She has not any recent problems a rash. She still has a few scars from when she had a rash for years so. Overall, it has been much better. She continues to have some swelling in her face but has not been bothered by it. No problems with swallowing or breathing. Patient does not even notice that her face is puffy. Her thyroid has not gotten any bigger in the last several months, so I will just continue to keep an eye on it. Patient has not had any problems with swelling in her legs.  Her legs are tender to palpation but they do not hurt when she walks and she does not have any other problems with her legs other than when I touch

## 2023-09-19 ENCOUNTER — OUTSIDE SERVICES (OUTPATIENT)
Dept: FAMILY MEDICINE CLINIC | Age: 88
End: 2023-09-19
Payer: MEDICARE

## 2023-09-19 DIAGNOSIS — F02.B18 MODERATE LATE ONSET ALZHEIMER'S DEMENTIA WITH OTHER BEHAVIORAL DISTURBANCE (HCC): ICD-10-CM

## 2023-09-19 DIAGNOSIS — E04.1 THYROID NODULE: ICD-10-CM

## 2023-09-19 DIAGNOSIS — F22 PARANOIA (HCC): ICD-10-CM

## 2023-09-19 DIAGNOSIS — J41.1 MUCOPURULENT CHRONIC BRONCHITIS (HCC): Primary | ICD-10-CM

## 2023-09-19 DIAGNOSIS — I10 ESSENTIAL HYPERTENSION: ICD-10-CM

## 2023-09-19 DIAGNOSIS — G30.1 MODERATE LATE ONSET ALZHEIMER'S DEMENTIA WITH OTHER BEHAVIORAL DISTURBANCE (HCC): ICD-10-CM

## 2023-09-19 PROCEDURE — 99309 SBSQ NF CARE MODERATE MDM 30: CPT | Performed by: FAMILY MEDICINE

## 2023-09-21 ENCOUNTER — TELEPHONE (OUTPATIENT)
Dept: FAMILY MEDICINE CLINIC | Age: 88
End: 2023-09-21

## 2023-09-26 DIAGNOSIS — M79.674 PAIN OF TOE OF RIGHT FOOT: Primary | ICD-10-CM

## 2023-09-26 RX ORDER — HYDROCODONE BITARTRATE AND ACETAMINOPHEN 5; 325 MG/1; MG/1
1 TABLET ORAL 2 TIMES DAILY PRN
Qty: 14 TABLET | Refills: 0 | Status: SHIPPED | OUTPATIENT
Start: 2023-09-26 | End: 2023-10-03

## 2023-09-26 RX ORDER — HYDROCODONE BITARTRATE AND ACETAMINOPHEN 5; 325 MG/1; MG/1
1 TABLET ORAL 2 TIMES DAILY PRN
Qty: 14 TABLET | Refills: 0 | Status: SHIPPED | OUTPATIENT
Start: 2023-09-26 | End: 2023-09-26 | Stop reason: SDUPTHER

## 2023-10-10 ENCOUNTER — OUTSIDE SERVICES (OUTPATIENT)
Dept: PRIMARY CARE CLINIC | Age: 88
End: 2023-10-10
Payer: MEDICARE

## 2023-10-10 DIAGNOSIS — E04.1 THYROID NODULE: ICD-10-CM

## 2023-10-10 DIAGNOSIS — I10 ESSENTIAL HYPERTENSION: ICD-10-CM

## 2023-10-10 DIAGNOSIS — F22 PARANOIA (HCC): ICD-10-CM

## 2023-10-10 DIAGNOSIS — J41.1 MUCOPURULENT CHRONIC BRONCHITIS (HCC): ICD-10-CM

## 2023-10-10 DIAGNOSIS — S81.812A NONINFECTED SKIN TEAR OF LEFT LOWER EXTREMITY, INITIAL ENCOUNTER: Primary | ICD-10-CM

## 2023-10-10 DIAGNOSIS — F02.80 LATE ONSET ALZHEIMER'S DISEASE WITHOUT BEHAVIORAL DISTURBANCE (HCC): ICD-10-CM

## 2023-10-10 DIAGNOSIS — G30.1 LATE ONSET ALZHEIMER'S DISEASE WITHOUT BEHAVIORAL DISTURBANCE (HCC): ICD-10-CM

## 2023-10-10 PROCEDURE — 99309 SBSQ NF CARE MODERATE MDM 30: CPT | Performed by: FAMILY MEDICINE

## 2023-10-12 RX ORDER — CEPHALEXIN 500 MG/1
500 CAPSULE ORAL 4 TIMES DAILY
COMMUNITY
End: 2023-12-06

## 2023-10-12 RX ORDER — HYDROCODONE BITARTRATE AND ACETAMINOPHEN 5; 325 MG/1; MG/1
1 TABLET ORAL EVERY 12 HOURS PRN
COMMUNITY

## 2023-10-23 ASSESSMENT — ENCOUNTER SYMPTOMS
ABDOMINAL PAIN: 0
DIARRHEA: 0
CHEST TIGHTNESS: 0
WHEEZING: 0
CONSTIPATION: 0
SHORTNESS OF BREATH: 0
COUGH: 0

## 2023-10-23 NOTE — PROGRESS NOTES
[address]  [address] [facility] [address]  Dept: [phone]  Dept Fax: [phone]  Loc: [phone]    Jose Zuluaga  is a 80 y.o. female who presents today for her medical conditions/complaints as noted below. Jose Zuluaga is c/o of     Chief Complaint   Patient presents with    Anxiety    Cough    Hypertension    Headache       HPI:   Meena Martinez is being seen for her regular monthly follow up while at [facility]. Overall, patient has been doing very well. When I saw her, she had just finished eating breakfast and was sitting in the dining room. She said feeling great and she did not have any complaints. Nursing staff said that she does have a toenail that is loose on her big toe that she has been acting like it is extremely painful so they did get an x-ray of it yesterday just to make sure that there was nothing wrong with the toe but the x-ray was normal and just showed some arthritis. I am sure her toenail is a little bit uncomfortable but I think she it's mostly bothered by somebody touching it. She does not like her legs touched in general and she never has. Otherwise, patient has been eating very well. No problems with constipation or diarrhea. No nausea or vomiting. She has not had any problems with shortness of breath recently and her cough has been pretty good as well. Patient denied any problems with chest pains. She said that she does get headaches pretty regularly but that is nothing new for her and she really did not have any concerns about that. Patient in general felt that she has been doing quite well. She was very happy to see me but did not really have anything that she wanted to talk about.     Past Medical History:   Diagnosis Date    Arthritis     Chronic kidney disease     h/o mild renal insufficency    COPD (chronic obstructive pulmonary disease) (HCC)     Dyspnea     chronic

## 2023-11-02 ASSESSMENT — ENCOUNTER SYMPTOMS
WHEEZING: 0
COUGH: 0
CONSTIPATION: 0
CHEST TIGHTNESS: 0
SHORTNESS OF BREATH: 0
DIARRHEA: 0
ABDOMINAL PAIN: 0

## 2023-11-02 NOTE — PROGRESS NOTES
Conjunctiva/sclera: Conjunctivae normal.   Neck:      Thyroid: No thyromegaly. Cardiovascular:      Rate and Rhythm: Normal rate and regular rhythm. Heart sounds: Normal heart sounds. No murmur heard. Pulmonary:      Effort: Pulmonary effort is normal. No respiratory distress. Breath sounds: Normal breath sounds. No wheezing. Abdominal:      General: Abdomen is flat. Bowel sounds are normal.      Palpations: Abdomen is soft. Tenderness: There is no abdominal tenderness. There is no guarding or rebound. Musculoskeletal:      Cervical back: Normal range of motion and neck supple. Lymphadenopathy:      Cervical: No cervical adenopathy. Skin:     General: Skin is warm and dry. Findings: No erythema or rash. Comments: Left lower leg is wrapped with a dressing. Neurological:      General: No focal deficit present. Mental Status: She is alert. Mental status is at baseline. Psychiatric:         Mood and Affect: Mood normal.         Speech: Speech normal.         Behavior: Behavior normal.         Cognition and Memory: Memory is impaired. Assessment:       Diagnosis Orders   1. Noninfected skin tear of left lower extremity, initial encounter        2. Late onset Alzheimer's disease without behavioral disturbance ([address])        3. Mucopurulent chronic bronchitis ([address])        4. Paranoia ([address])        5. Essential hypertension        6. Thyroid nodule                  Plan:   Skin tear to left leg. Stable. Patient is getting dressing done of it which is helping it heal. She just does not like to have them done. I still do not think that she is in a severe amount of pain. I think it is more of a behavioral yelling that she is doing. I still do have Norco ordered for her and I encouraged nursing staff to give it before dressing changes. Otherwise, I did not make any further changes to her pain medication or her anxiety medications. Dementia. Stable.  No recent worsening of her 4 = No assist / stand by assistance

## 2023-11-14 ENCOUNTER — OUTSIDE SERVICES (OUTPATIENT)
Dept: FAMILY MEDICINE CLINIC | Age: 88
End: 2023-11-14
Payer: MEDICARE

## 2023-11-14 DIAGNOSIS — I10 ESSENTIAL HYPERTENSION: ICD-10-CM

## 2023-11-14 DIAGNOSIS — J41.1 MUCOPURULENT CHRONIC BRONCHITIS (HCC): Primary | ICD-10-CM

## 2023-11-14 DIAGNOSIS — G30.1 MODERATE LATE ONSET ALZHEIMER'S DEMENTIA WITH OTHER BEHAVIORAL DISTURBANCE (HCC): ICD-10-CM

## 2023-11-14 DIAGNOSIS — F41.9 ANXIETY: ICD-10-CM

## 2023-11-14 DIAGNOSIS — F02.B18 MODERATE LATE ONSET ALZHEIMER'S DEMENTIA WITH OTHER BEHAVIORAL DISTURBANCE (HCC): ICD-10-CM

## 2023-11-14 PROCEDURE — 99309 SBSQ NF CARE MODERATE MDM 30: CPT | Performed by: FAMILY MEDICINE

## 2023-11-27 DIAGNOSIS — H53.139 SUDDEN VISUAL LOSS, UNSPECIFIED LATERALITY: ICD-10-CM

## 2023-11-27 DIAGNOSIS — R41.0 ACUTE CONFUSION: Primary | ICD-10-CM

## 2023-11-29 ENCOUNTER — TELEPHONE (OUTPATIENT)
Dept: FAMILY MEDICINE CLINIC | Age: 88
End: 2023-11-29

## 2023-11-29 RX ORDER — BUDESONIDE AND FORMOTEROL FUMARATE DIHYDRATE 80; 4.5 UG/1; UG/1
2 AEROSOL RESPIRATORY (INHALATION) 2 TIMES DAILY
COMMUNITY

## 2023-11-30 ENCOUNTER — TELEPHONE (OUTPATIENT)
Dept: FAMILY MEDICINE CLINIC | Age: 88
End: 2023-11-30

## 2023-11-30 RX ORDER — BUSPIRONE HYDROCHLORIDE 15 MG/1
10 TABLET ORAL 3 TIMES DAILY
Qty: 90 TABLET | Refills: 1
Start: 2023-11-30

## 2023-11-30 RX ORDER — PAROXETINE HYDROCHLORIDE 20 MG/1
20 TABLET, FILM COATED ORAL NIGHTLY
Qty: 30 TABLET | Refills: 1
Start: 2023-11-30

## 2023-11-30 RX ORDER — QUETIAPINE FUMARATE 50 MG/1
50 TABLET, FILM COATED ORAL
Qty: 90 TABLET | Refills: 1
Start: 2023-11-30

## 2023-11-30 NOTE — TELEPHONE ENCOUNTER
Received a note saying patient is anxious and short of breath asking for roxanol and ativan. I called her nurse to clarify that she is not on hospice. I suspect she is anxious and adjusted her buspar, paxil and seroquel.

## 2023-12-04 DIAGNOSIS — F22 PARANOIA (HCC): Primary | ICD-10-CM

## 2023-12-04 RX ORDER — LORAZEPAM 0.5 MG/1
0.5 TABLET ORAL EVERY 8 HOURS PRN
Qty: 90 TABLET | Refills: 0 | Status: SHIPPED | OUTPATIENT
Start: 2023-12-04 | End: 2024-01-03

## 2023-12-05 ENCOUNTER — OUTSIDE SERVICES (OUTPATIENT)
Dept: PRIMARY CARE CLINIC | Age: 88
End: 2023-12-05
Payer: MEDICARE

## 2023-12-05 DIAGNOSIS — S81.812A NONINFECTED SKIN TEAR OF LEFT LOWER EXTREMITY, INITIAL ENCOUNTER: ICD-10-CM

## 2023-12-05 DIAGNOSIS — G30.1 LATE ONSET ALZHEIMER'S DISEASE WITHOUT BEHAVIORAL DISTURBANCE (HCC): ICD-10-CM

## 2023-12-05 DIAGNOSIS — F41.9 ANXIETY: Primary | ICD-10-CM

## 2023-12-05 DIAGNOSIS — I10 ESSENTIAL HYPERTENSION: ICD-10-CM

## 2023-12-05 DIAGNOSIS — F02.80 LATE ONSET ALZHEIMER'S DISEASE WITHOUT BEHAVIORAL DISTURBANCE (HCC): ICD-10-CM

## 2023-12-05 DIAGNOSIS — J41.1 MUCOPURULENT CHRONIC BRONCHITIS (HCC): ICD-10-CM

## 2023-12-05 PROCEDURE — 99309 SBSQ NF CARE MODERATE MDM 30: CPT | Performed by: FAMILY MEDICINE

## 2023-12-06 ENCOUNTER — TELEPHONE (OUTPATIENT)
Dept: FAMILY MEDICINE CLINIC | Age: 88
End: 2023-12-06

## 2023-12-06 ENCOUNTER — HOSPITAL ENCOUNTER (OUTPATIENT)
Age: 88
Discharge: HOME | End: 2023-12-06
Attending: FAMILY MEDICINE
Payer: MEDICARE

## 2023-12-06 ENCOUNTER — HOSPITAL ENCOUNTER (OUTPATIENT)
Dept: CT IMAGING | Age: 88
Discharge: HOME | End: 2023-12-08
Attending: FAMILY MEDICINE
Payer: MEDICARE

## 2023-12-06 DIAGNOSIS — R41.0 ACUTE CONFUSION: ICD-10-CM

## 2023-12-06 DIAGNOSIS — H53.139 SUDDEN VISUAL LOSS, UNSPECIFIED LATERALITY: ICD-10-CM

## 2023-12-06 LAB
ANION GAP SERPL CALCULATED.3IONS-SCNC: 7 MMOL/L (ref 9–17)
BUN SERPL-MCNC: 11 MG/DL (ref 8–23)
BUN/CREAT SERPL: 16 (ref 9–20)
CALCIUM SERPL-MCNC: 9.4 MG/DL (ref 8.6–10.4)
CHLORIDE SERPL-SCNC: 103 MMOL/L (ref 98–107)
CO2 SERPL-SCNC: 32 MMOL/L (ref 20–31)
CREAT SERPL-MCNC: 0.7 MG/DL (ref 0.5–0.9)
GFR SERPL CREATININE-BSD FRML MDRD: >60 ML/MIN/1.73M2
GLUCOSE SERPL-MCNC: 157 MG/DL (ref 70–99)
POTASSIUM SERPL-SCNC: 4.7 MMOL/L (ref 3.7–5.3)
SODIUM SERPL-SCNC: 142 MMOL/L (ref 135–144)

## 2023-12-06 PROCEDURE — 36415 COLL VENOUS BLD VENIPUNCTURE: CPT

## 2023-12-06 PROCEDURE — 80048 BASIC METABOLIC PNL TOTAL CA: CPT

## 2023-12-06 PROCEDURE — 70450 CT HEAD/BRAIN W/O DYE: CPT

## 2023-12-12 ASSESSMENT — ENCOUNTER SYMPTOMS
COUGH: 0
SHORTNESS OF BREATH: 0
ABDOMINAL PAIN: 0
WHEEZING: 0
CONSTIPATION: 0
CHEST TIGHTNESS: 0
DIARRHEA: 0

## 2023-12-12 NOTE — PROGRESS NOTES
[address]  [address]  [address]  Dept: [phone]  Dept Fax: [phone]  Loc: [phone]    Bertha Sheppard  is a 80 y.o. female who presents today for her medical conditions/complaints as noted below. Bertha Sheppard is c/o of     Chief Complaint   Patient presents with    Hypertension    Cough    Anxiety    Dementia       HPI:   Giselle Beyer is being seen for her regular monthly follow up while at [facility]. Overall, patient has been doing relatively well. She continues to have a wound on her left leg from when she fell several weeks ago but she is not near as tender to palpation, and she has not been screaming in pain like she was previously. Patient is typically short of breath at baseline but she was not complaining of any recent worsening shortness of breath. Patient has been overall doing pretty well. She was not complaining of any worsening shortness of breath recently and she said her cough is just about at baseline. No problems with chest pains at all. She said that she has been feeling pretty good. No problems with headaches. She has not had any issues with neck pain or tightness. From what nursing staff said her anxiety has been pretty stable and at baseline. No recent issues with increased paranoia. Overall, patient seems to be sleeping pretty well at night. The one main issue that she has had recently is that she seems to be having more trouble seeing. She is having trouble finding her room. She is having trouble seeing things in front of her, she is frequently asking for something that is just right there and nursing staff assumes that it is because she is having trouble seeing it. I did recommend that they get an appointment for her next time they come to [facility].      Past Medical History:   Diagnosis Date    Arthritis     Chronic kidney disease     h/o mild renal insufficency    COPD

## 2023-12-13 ENCOUNTER — TELEPHONE (OUTPATIENT)
Dept: FAMILY MEDICINE CLINIC | Age: 88
End: 2023-12-13

## 2023-12-13 NOTE — TELEPHONE ENCOUNTER
Izabela Simon from [facility] calling about Lennox Lone medication. She wanted to know if Dr. Jean Claude Oscar was going to manage her meds or if hospice was going to manage them. Hospice provider had some changes and is going to fax over the changes but is going to hold the changes until verified with Dr. Jean Claude Oscar about management of the meds.

## 2023-12-15 NOTE — PROGRESS NOTES
[place] [address] [facility] [place] WALK IN DEPARTMENT  [address]  [address] [place], [place] [id number]  Dept: [phone]  Dept Fax: [phone]    Bertha Sheppard  is a 80 y.o. female who presents today for her medical conditions/complaints as noted below. Bertha Sheppard is c/o of     Chief Complaint   Patient presents with    Anxiety    Shortness of Breath    Blurred Vision    Hypertension    Dementia    Leg Pain       HPI:   Giselle Beyer is being seen for her regular monthly follow up while at [facility]. Patients been having some trouble lately with increased anxiety and agitation. She has been yelling out frequently and she is repeatedly stating that she cannot breathe, or she cannot see. Whenever the nurses check her oxygen, it is normal. Patient has been wearing 2 liters of oxygen all the time, but she continues to say that she cannot breathe. Several times she has been in the cafeteria and started yelling that she could not breathe and getting very agitated and upset. Patient has really been struggling. Last week, I increased her Buspar to 15mg three times a day, I increased her Seroquel to 50mg nightly, I increased her Paxil to 30mg daily all to try to help combat the anxiety, Because I am pretty sure that a majority of the issue is that she is anxious because she is anxious normally and to be having worsening dementia I believe that is just making her anxiety significantly worse. I also started her on Ativan as needed because nursing staff was still telling me that she was incredibly anxious and that she kept yelling out, so I did start her on the Ativan as well. This seems to be helping as nursing staff said today, she has been yelling out a lot less, she has been resting more comfortably. Since she has been eating in her room occasionally that seems to be helping with her agitation.  Other nurses told me that she is still

## 2024-01-16 ENCOUNTER — TELEPHONE (OUTPATIENT)
Dept: FAMILY MEDICINE CLINIC | Age: 89
End: 2024-01-16

## 2024-01-16 ENCOUNTER — OUTSIDE SERVICES (OUTPATIENT)
Dept: FAMILY MEDICINE CLINIC | Age: 89
End: 2024-01-16

## 2024-01-16 DIAGNOSIS — F02.B18 MODERATE LATE ONSET ALZHEIMER'S DEMENTIA WITH OTHER BEHAVIORAL DISTURBANCE (HCC): ICD-10-CM

## 2024-01-16 DIAGNOSIS — J41.1 MUCOPURULENT CHRONIC BRONCHITIS (HCC): Primary | ICD-10-CM

## 2024-01-16 DIAGNOSIS — I50.22 CHRONIC SYSTOLIC CHF (CONGESTIVE HEART FAILURE) (HCC): ICD-10-CM

## 2024-01-16 DIAGNOSIS — G30.1 MODERATE LATE ONSET ALZHEIMER'S DEMENTIA WITH OTHER BEHAVIORAL DISTURBANCE (HCC): ICD-10-CM

## 2024-01-16 DIAGNOSIS — F22 PARANOIA (HCC): ICD-10-CM

## 2024-01-16 DIAGNOSIS — I71.20 THORACIC AORTIC ANEURYSM WITHOUT RUPTURE, UNSPECIFIED PART (HCC): ICD-10-CM

## 2024-01-16 DIAGNOSIS — I20.9 ANGINA PECTORIS, UNSPECIFIED (HCC): ICD-10-CM

## 2024-01-16 PROBLEM — I50.23 ACUTE ON CHRONIC SYSTOLIC CHF (CONGESTIVE HEART FAILURE) (HCC): Status: RESOLVED | Noted: 2022-09-15 | Resolved: 2024-01-01

## 2024-01-16 NOTE — TELEPHONE ENCOUNTER
Anais from [facility] calling to update Dr. Franco on patient. Anais stated patient is close to active. She is minimally responsive, opens eyes and occasionally mumbles. Stated when she left yesterday she told her bye and that she would see her tomorrow and patient opened her eyes and mumbled but it was very quick. BRIGIDO and CHRIS said patient choked on meds yesterday so they were stopped today. Anais stated today if not tomorrow she will be active.

## 2024-01-17 ENCOUNTER — TELEPHONE (OUTPATIENT)
Dept: FAMILY MEDICINE CLINIC | Age: 89
End: 2024-01-17
